# Patient Record
Sex: FEMALE | Race: BLACK OR AFRICAN AMERICAN | NOT HISPANIC OR LATINO
[De-identification: names, ages, dates, MRNs, and addresses within clinical notes are randomized per-mention and may not be internally consistent; named-entity substitution may affect disease eponyms.]

---

## 2017-08-17 ENCOUNTER — APPOINTMENT (OUTPATIENT)
Dept: SPINE | Facility: CLINIC | Age: 62
End: 2017-08-17
Payer: MEDICAID

## 2017-08-17 VITALS
HEIGHT: 64 IN | DIASTOLIC BLOOD PRESSURE: 82 MMHG | SYSTOLIC BLOOD PRESSURE: 134 MMHG | BODY MASS INDEX: 50.02 KG/M2 | WEIGHT: 293 LBS

## 2017-08-17 PROCEDURE — 99205 OFFICE O/P NEW HI 60 MIN: CPT

## 2017-08-25 ENCOUNTER — OUTPATIENT (OUTPATIENT)
Dept: OUTPATIENT SERVICES | Facility: HOSPITAL | Age: 62
LOS: 1 days | End: 2017-08-25
Payer: COMMERCIAL

## 2017-08-25 VITALS
RESPIRATION RATE: 16 BRPM | OXYGEN SATURATION: 98 % | TEMPERATURE: 36 F | DIASTOLIC BLOOD PRESSURE: 81 MMHG | SYSTOLIC BLOOD PRESSURE: 136 MMHG | HEART RATE: 66 BPM | WEIGHT: 293 LBS | HEIGHT: 64 IN

## 2017-08-25 DIAGNOSIS — M48.06 SPINAL STENOSIS, LUMBAR REGION: ICD-10-CM

## 2017-08-25 DIAGNOSIS — G47.30 SLEEP APNEA, UNSPECIFIED: ICD-10-CM

## 2017-08-25 DIAGNOSIS — I10 ESSENTIAL (PRIMARY) HYPERTENSION: ICD-10-CM

## 2017-08-25 DIAGNOSIS — H26.9 UNSPECIFIED CATARACT: Chronic | ICD-10-CM

## 2017-08-25 DIAGNOSIS — G56.03 CARPAL TUNNEL SYNDROME, BILATERAL UPPER LIMBS: Chronic | ICD-10-CM

## 2017-08-25 DIAGNOSIS — E11.9 TYPE 2 DIABETES MELLITUS WITHOUT COMPLICATIONS: ICD-10-CM

## 2017-08-25 DIAGNOSIS — G40.909 EPILEPSY, UNSPECIFIED, NOT INTRACTABLE, WITHOUT STATUS EPILEPTICUS: ICD-10-CM

## 2017-08-25 DIAGNOSIS — Z90.89 ACQUIRED ABSENCE OF OTHER ORGANS: Chronic | ICD-10-CM

## 2017-08-25 DIAGNOSIS — Z90.710 ACQUIRED ABSENCE OF BOTH CERVIX AND UTERUS: Chronic | ICD-10-CM

## 2017-08-25 DIAGNOSIS — Z01.818 ENCOUNTER FOR OTHER PREPROCEDURAL EXAMINATION: ICD-10-CM

## 2017-08-25 DIAGNOSIS — M48.00 SPINAL STENOSIS, SITE UNSPECIFIED: ICD-10-CM

## 2017-08-25 LAB
ANION GAP SERPL CALC-SCNC: 13 MMOL/L — SIGNIFICANT CHANGE UP (ref 5–17)
BUN SERPL-MCNC: 24 MG/DL — HIGH (ref 7–23)
CALCIUM SERPL-MCNC: 10.5 MG/DL — SIGNIFICANT CHANGE UP (ref 8.4–10.5)
CHLORIDE SERPL-SCNC: 105 MMOL/L — SIGNIFICANT CHANGE UP (ref 96–108)
CO2 SERPL-SCNC: 25 MMOL/L — SIGNIFICANT CHANGE UP (ref 22–31)
CREAT SERPL-MCNC: 1 MG/DL — SIGNIFICANT CHANGE UP (ref 0.5–1.3)
GLUCOSE SERPL-MCNC: 85 MG/DL — SIGNIFICANT CHANGE UP (ref 70–99)
HBA1C BLD-MCNC: 5.9 % — HIGH (ref 4–5.6)
HCT VFR BLD CALC: 39.6 % — SIGNIFICANT CHANGE UP (ref 34.5–45)
HGB BLD-MCNC: 12.6 G/DL — SIGNIFICANT CHANGE UP (ref 11.5–15.5)
MCHC RBC-ENTMCNC: 29 PG — SIGNIFICANT CHANGE UP (ref 27–34)
MCHC RBC-ENTMCNC: 31.8 GM/DL — LOW (ref 32–36)
MCV RBC AUTO: 91.2 FL — SIGNIFICANT CHANGE UP (ref 80–100)
PLATELET # BLD AUTO: 231 K/UL — SIGNIFICANT CHANGE UP (ref 150–400)
POTASSIUM SERPL-MCNC: 5.5 MMOL/L — HIGH (ref 3.5–5.3)
POTASSIUM SERPL-SCNC: 5.5 MMOL/L — HIGH (ref 3.5–5.3)
RBC # BLD: 4.34 M/UL — SIGNIFICANT CHANGE UP (ref 3.8–5.2)
RBC # FLD: 16 % — HIGH (ref 10.3–14.5)
SODIUM SERPL-SCNC: 143 MMOL/L — SIGNIFICANT CHANGE UP (ref 135–145)
WBC # BLD: 7.08 K/UL — SIGNIFICANT CHANGE UP (ref 3.8–10.5)
WBC # FLD AUTO: 7.08 K/UL — SIGNIFICANT CHANGE UP (ref 3.8–10.5)

## 2017-08-25 PROCEDURE — G0463: CPT

## 2017-08-25 PROCEDURE — 85027 COMPLETE CBC AUTOMATED: CPT

## 2017-08-25 PROCEDURE — 83036 HEMOGLOBIN GLYCOSYLATED A1C: CPT

## 2017-08-25 PROCEDURE — 80048 BASIC METABOLIC PNL TOTAL CA: CPT

## 2017-08-25 RX ORDER — CEFAZOLIN SODIUM 1 G
3000 VIAL (EA) INJECTION ONCE
Qty: 0 | Refills: 0 | Status: COMPLETED | OUTPATIENT
Start: 2017-09-01 | End: 2017-09-01

## 2017-08-25 NOTE — H&P PST ADULT - PMH
Diabetes  on medications  HTN (hypertension)  on medication  Morbid obesity    Seizure disorder  on medication  stable no episode x 15 years  Sleep apnea  on CPAP x 10-15 years

## 2017-08-25 NOTE — H&P PST ADULT - VISION (WITH CORRECTIVE LENSES IF THE PATIENT USUALLY WEARS THEM):
Normal vision: sees adequately in most situations; can see medication labels, newsprint/weqrs eyeglasses

## 2017-08-25 NOTE — H&P PST ADULT - PROBLEM SELECTOR PLAN 1
L4-5 lumbar laminectomy removal of synovial cyst  CBC/BMP/hgba1c bloods drawn sent pending result    PST instruction given with antibacterial soap given , verbalizes understanding   To continue taking ASA   81 mg  daily as prescribed regularly    Has own appointment with PMD  for medical evaluation

## 2017-08-25 NOTE — H&P PST ADULT - MUSCULOSKELETAL COMMENTS
hx backpains with numbness to both legs uses cane for support on wheelchair while in PST but able to walk short distance

## 2017-08-25 NOTE — H&P PST ADULT - HISTORY OF PRESENT ILLNESS
62 y/o female for L4-5 lumbar laminectomy. Patient has hx of backpains/ sciatica and leg numbness on and off x 1 year . Patient had been seen by PMD tried other conservative treatment losing weight and medications with  physical therapy with no  relief. Patient referred to DR Angulo , evaluated had undergone diagnostic test which was positive for synovial cyst and neural impingement , needed surgical intervention for treatment.

## 2017-08-25 NOTE — H&P PST ADULT - NEUROLOGICAL COMMENTS
hx seizure disorder x  19 years on medications controlled no episode x 15 years as stated by patient

## 2017-08-25 NOTE — H&P PST ADULT - PSH
Acquired cataract  bialteral surgery with lens implants  Bilateral carpal tunnel syndrome  bilateral carpal tunnel release 2016  History of hysterectomy  20 years ago  S/P tonsillectomy  childhood

## 2017-09-01 ENCOUNTER — TRANSCRIPTION ENCOUNTER (OUTPATIENT)
Age: 62
End: 2017-09-01

## 2017-09-01 ENCOUNTER — RESULT REVIEW (OUTPATIENT)
Age: 62
End: 2017-09-01

## 2017-09-01 ENCOUNTER — INPATIENT (INPATIENT)
Facility: HOSPITAL | Age: 62
LOS: 0 days | Discharge: HOME | DRG: 519 | End: 2017-09-01
Attending: NEUROLOGICAL SURGERY | Admitting: NEUROLOGICAL SURGERY
Payer: COMMERCIAL

## 2017-09-01 ENCOUNTER — APPOINTMENT (OUTPATIENT)
Dept: SPINE | Facility: HOSPITAL | Age: 62
End: 2017-09-01

## 2017-09-01 VITALS
OXYGEN SATURATION: 97 % | DIASTOLIC BLOOD PRESSURE: 60 MMHG | SYSTOLIC BLOOD PRESSURE: 126 MMHG | TEMPERATURE: 97 F | RESPIRATION RATE: 16 BRPM | HEART RATE: 58 BPM

## 2017-09-01 VITALS
HEART RATE: 66 BPM | TEMPERATURE: 98 F | OXYGEN SATURATION: 99 % | WEIGHT: 293 LBS | HEIGHT: 64 IN | DIASTOLIC BLOOD PRESSURE: 74 MMHG | SYSTOLIC BLOOD PRESSURE: 193 MMHG | RESPIRATION RATE: 18 BRPM

## 2017-09-01 DIAGNOSIS — M48.00 SPINAL STENOSIS, SITE UNSPECIFIED: ICD-10-CM

## 2017-09-01 DIAGNOSIS — G56.03 CARPAL TUNNEL SYNDROME, BILATERAL UPPER LIMBS: Chronic | ICD-10-CM

## 2017-09-01 DIAGNOSIS — Z90.89 ACQUIRED ABSENCE OF OTHER ORGANS: Chronic | ICD-10-CM

## 2017-09-01 DIAGNOSIS — Z90.710 ACQUIRED ABSENCE OF BOTH CERVIX AND UTERUS: Chronic | ICD-10-CM

## 2017-09-01 DIAGNOSIS — H26.9 UNSPECIFIED CATARACT: Chronic | ICD-10-CM

## 2017-09-01 LAB
ANION GAP SERPL CALC-SCNC: 14 MMOL/L — SIGNIFICANT CHANGE UP (ref 5–17)
BUN SERPL-MCNC: 23 MG/DL — SIGNIFICANT CHANGE UP (ref 7–23)
CALCIUM SERPL-MCNC: 10.8 MG/DL — HIGH (ref 8.4–10.5)
CHLORIDE SERPL-SCNC: 104 MMOL/L — SIGNIFICANT CHANGE UP (ref 96–108)
CO2 SERPL-SCNC: 26 MMOL/L — SIGNIFICANT CHANGE UP (ref 22–31)
CREAT SERPL-MCNC: 0.96 MG/DL — SIGNIFICANT CHANGE UP (ref 0.5–1.3)
GLUCOSE SERPL-MCNC: 90 MG/DL — SIGNIFICANT CHANGE UP (ref 70–99)
POTASSIUM SERPL-MCNC: 4.1 MMOL/L — SIGNIFICANT CHANGE UP (ref 3.5–5.3)
POTASSIUM SERPL-SCNC: 4.1 MMOL/L — SIGNIFICANT CHANGE UP (ref 3.5–5.3)
SODIUM SERPL-SCNC: 144 MMOL/L — SIGNIFICANT CHANGE UP (ref 135–145)

## 2017-09-01 PROCEDURE — 76000 FLUOROSCOPY <1 HR PHYS/QHP: CPT

## 2017-09-01 PROCEDURE — 63267 EXCISE INTRSPINL LESION LMBR: CPT

## 2017-09-01 PROCEDURE — 80048 BASIC METABOLIC PNL TOTAL CA: CPT

## 2017-09-01 PROCEDURE — 69990 MICROSURGERY ADD-ON: CPT

## 2017-09-01 PROCEDURE — 88304 TISSUE EXAM BY PATHOLOGIST: CPT | Mod: 26

## 2017-09-01 PROCEDURE — 88304 TISSUE EXAM BY PATHOLOGIST: CPT

## 2017-09-01 PROCEDURE — C1889: CPT

## 2017-09-01 PROCEDURE — C1769: CPT

## 2017-09-01 RX ORDER — DIVALPROEX SODIUM 500 MG/1
0 TABLET, DELAYED RELEASE ORAL
Qty: 0 | Refills: 0 | COMMUNITY

## 2017-09-01 RX ORDER — GABAPENTIN 400 MG/1
0 CAPSULE ORAL
Qty: 0 | Refills: 0 | COMMUNITY

## 2017-09-01 RX ORDER — OXYCODONE AND ACETAMINOPHEN 5; 325 MG/1; MG/1
1 TABLET ORAL EVERY 4 HOURS
Qty: 0 | Refills: 0 | Status: DISCONTINUED | OUTPATIENT
Start: 2017-09-01 | End: 2017-09-01

## 2017-09-01 RX ORDER — OXYCODONE HYDROCHLORIDE 5 MG/1
2 TABLET ORAL
Qty: 0 | Refills: 0 | COMMUNITY
Start: 2017-09-01

## 2017-09-01 RX ORDER — GABAPENTIN 400 MG/1
300 CAPSULE ORAL
Qty: 0 | Refills: 0 | Status: DISCONTINUED | OUTPATIENT
Start: 2017-09-01 | End: 2017-09-01

## 2017-09-01 RX ORDER — AMLODIPINE BESYLATE AND BENAZEPRIL HYDROCHLORIDE 10; 20 MG/1; MG/1
1 CAPSULE ORAL
Qty: 0 | Refills: 0 | COMMUNITY

## 2017-09-01 RX ORDER — SENNA PLUS 8.6 MG/1
2 TABLET ORAL AT BEDTIME
Qty: 0 | Refills: 0 | Status: DISCONTINUED | OUTPATIENT
Start: 2017-09-01 | End: 2017-09-01

## 2017-09-01 RX ORDER — CEFAZOLIN SODIUM 1 G
3000 VIAL (EA) INJECTION EVERY 8 HOURS
Qty: 0 | Refills: 0 | Status: DISCONTINUED | OUTPATIENT
Start: 2017-09-01 | End: 2017-09-01

## 2017-09-01 RX ORDER — OXYCODONE HYDROCHLORIDE 5 MG/1
2 TABLET ORAL
Qty: 30 | Refills: 0 | OUTPATIENT
Start: 2017-09-01

## 2017-09-01 RX ORDER — ASPIRIN/CALCIUM CARB/MAGNESIUM 324 MG
1 TABLET ORAL
Qty: 0 | Refills: 0 | COMMUNITY

## 2017-09-01 RX ORDER — DOCUSATE SODIUM 100 MG
100 CAPSULE ORAL THREE TIMES A DAY
Qty: 0 | Refills: 0 | Status: DISCONTINUED | OUTPATIENT
Start: 2017-09-01 | End: 2017-09-01

## 2017-09-01 RX ORDER — CEPHALEXIN 500 MG
1 CAPSULE ORAL
Qty: 20 | Refills: 0 | OUTPATIENT
Start: 2017-09-01 | End: 2017-09-11

## 2017-09-01 RX ORDER — OXYCODONE AND ACETAMINOPHEN 5; 325 MG/1; MG/1
2 TABLET ORAL EVERY 4 HOURS
Qty: 0 | Refills: 0 | Status: DISCONTINUED | OUTPATIENT
Start: 2017-09-01 | End: 2017-09-01

## 2017-09-01 RX ORDER — OXYCODONE HYDROCHLORIDE 5 MG/1
1 TABLET ORAL
Qty: 0 | Refills: 0 | COMMUNITY
Start: 2017-09-01

## 2017-09-01 RX ORDER — DOCUSATE SODIUM 100 MG
1 CAPSULE ORAL
Qty: 0 | Refills: 0 | COMMUNITY
Start: 2017-09-01

## 2017-09-01 RX ORDER — LIDOCAINE HCL 20 MG/ML
0.2 VIAL (ML) INJECTION ONCE
Qty: 0 | Refills: 0 | Status: DISCONTINUED | OUTPATIENT
Start: 2017-09-01 | End: 2017-09-01

## 2017-09-01 RX ORDER — METOPROLOL TARTRATE 50 MG
12.5 TABLET ORAL
Qty: 0 | Refills: 0 | Status: DISCONTINUED | OUTPATIENT
Start: 2017-09-01 | End: 2017-09-01

## 2017-09-01 RX ORDER — ONDANSETRON 8 MG/1
4 TABLET, FILM COATED ORAL ONCE
Qty: 0 | Refills: 0 | Status: DISCONTINUED | OUTPATIENT
Start: 2017-09-01 | End: 2017-09-01

## 2017-09-01 RX ORDER — INSULIN LISPRO 100/ML
VIAL (ML) SUBCUTANEOUS
Qty: 0 | Refills: 0 | Status: DISCONTINUED | OUTPATIENT
Start: 2017-09-01 | End: 2017-09-01

## 2017-09-01 RX ORDER — DIVALPROEX SODIUM 500 MG/1
500 TABLET, DELAYED RELEASE ORAL
Qty: 0 | Refills: 0 | Status: DISCONTINUED | OUTPATIENT
Start: 2017-09-01 | End: 2017-09-01

## 2017-09-01 RX ORDER — SODIUM CHLORIDE 9 MG/ML
3 INJECTION INTRAMUSCULAR; INTRAVENOUS; SUBCUTANEOUS EVERY 8 HOURS
Qty: 0 | Refills: 0 | Status: DISCONTINUED | OUTPATIENT
Start: 2017-09-01 | End: 2017-09-01

## 2017-09-01 RX ORDER — HYDROMORPHONE HYDROCHLORIDE 2 MG/ML
0.2 INJECTION INTRAMUSCULAR; INTRAVENOUS; SUBCUTANEOUS
Qty: 0 | Refills: 0 | Status: DISCONTINUED | OUTPATIENT
Start: 2017-09-01 | End: 2017-09-01

## 2017-09-01 RX ORDER — INSULIN LISPRO 100/ML
VIAL (ML) SUBCUTANEOUS AT BEDTIME
Qty: 0 | Refills: 0 | Status: DISCONTINUED | OUTPATIENT
Start: 2017-09-01 | End: 2017-09-01

## 2017-09-01 RX ORDER — DEXTROSE MONOHYDRATE, SODIUM CHLORIDE, AND POTASSIUM CHLORIDE 50; .745; 4.5 G/1000ML; G/1000ML; G/1000ML
1000 INJECTION, SOLUTION INTRAVENOUS
Qty: 0 | Refills: 0 | Status: DISCONTINUED | OUTPATIENT
Start: 2017-09-01 | End: 2017-09-01

## 2017-09-01 RX ORDER — AMLODIPINE BESYLATE 2.5 MG/1
10 TABLET ORAL DAILY
Qty: 0 | Refills: 0 | Status: DISCONTINUED | OUTPATIENT
Start: 2017-09-01 | End: 2017-09-01

## 2017-09-01 RX ADMIN — Medication 100 MILLIGRAM(S): at 14:24

## 2017-09-01 RX ADMIN — DEXTROSE MONOHYDRATE, SODIUM CHLORIDE, AND POTASSIUM CHLORIDE 75 MILLILITER(S): 50; .745; 4.5 INJECTION, SOLUTION INTRAVENOUS at 11:26

## 2017-09-01 RX ADMIN — GABAPENTIN 300 MILLIGRAM(S): 400 CAPSULE ORAL at 12:42

## 2017-09-01 NOTE — ASU DISCHARGE PLAN (ADULT/PEDIATRIC). - NOTIFY
Unable to Urinate/Persistent Nausea and Vomiting/Swelling that continues/Numbness, color, or temperature change to extremity/Excessive Diarrhea/Go to the ED if you have any: bleeding, nausea, vomiting, diarrhea, constipation, fevers, chills, chest pain, shortness of breath, dizziness, gait/balance disturbances, wound drainage/erythema, pain not controlled by medication, or any other new symptoms./Numbness, tingling/Bleeding that does not stop/Pain not relieved by Medications/Fever greater than 101/Inability to Tolerate Liquids or Foods

## 2017-09-01 NOTE — ASU DISCHARGE PLAN (ADULT/PEDIATRIC). - MEDICATION SUMMARY - MEDICATIONS TO STOP TAKING
I will STOP taking the medications listed below when I get home from the hospital:    Ecotrin Adult Low Strength 81 mg oral delayed release tablet  -- 1 tab(s) by mouth once a day

## 2017-09-01 NOTE — ASU DISCHARGE PLAN (ADULT/PEDIATRIC). - FOLLOWUP APPOINTMENT CLINIC/PHYSICIAN
follow up with neurosurgery, Dr. Angulo, within 1 week of discharge, call [phone]  follow up with PMD within 1 week of discharge

## 2017-09-01 NOTE — ASU DISCHARGE PLAN (ADULT/PEDIATRIC). - ACTIVITY LEVEL
no heavy lifting/no sports/gym/no tub baths/no lifting or strenuous activity.  do not operate machinery until cleared to do so by physician.  do not drive until cleared to do so by physician/no exercise no sports/gym/no tub baths/no heavy lifting/no exercise/no lifting or strenuous activity.  do not operate machinery until cleared to do so by physician.  do not return to work until cleared to do so by physician

## 2017-09-07 LAB — SURGICAL PATHOLOGY STUDY: SIGNIFICANT CHANGE UP

## 2017-09-08 ENCOUNTER — APPOINTMENT (OUTPATIENT)
Dept: SPINE | Facility: CLINIC | Age: 62
End: 2017-09-08
Payer: MEDICAID

## 2017-09-08 VITALS
WEIGHT: 293 LBS | SYSTOLIC BLOOD PRESSURE: 140 MMHG | RESPIRATION RATE: 18 BRPM | DIASTOLIC BLOOD PRESSURE: 82 MMHG | HEIGHT: 64 IN | HEART RATE: 72 BPM | BODY MASS INDEX: 50.02 KG/M2 | TEMPERATURE: 98.1 F | OXYGEN SATURATION: 96 %

## 2017-09-08 DIAGNOSIS — Z86.39 PERSONAL HISTORY OF OTHER ENDOCRINE, NUTRITIONAL AND METABOLIC DISEASE: ICD-10-CM

## 2017-09-08 DIAGNOSIS — Z78.9 OTHER SPECIFIED HEALTH STATUS: ICD-10-CM

## 2017-09-08 DIAGNOSIS — F17.200 NICOTINE DEPENDENCE, UNSPECIFIED, UNCOMPLICATED: ICD-10-CM

## 2017-09-08 DIAGNOSIS — Z86.79 PERSONAL HISTORY OF OTHER DISEASES OF THE CIRCULATORY SYSTEM: ICD-10-CM

## 2017-09-08 PROCEDURE — 99024 POSTOP FOLLOW-UP VISIT: CPT

## 2017-09-08 RX ORDER — METOPROLOL SUCCINATE 100 MG/1
100 TABLET, EXTENDED RELEASE ORAL
Qty: 60 | Refills: 0 | Status: ACTIVE | COMMUNITY
Start: 2017-06-19

## 2017-09-08 RX ORDER — HYDROCHLOROTHIAZIDE 12.5 MG/1
12.5 TABLET ORAL
Qty: 60 | Refills: 0 | Status: ACTIVE | COMMUNITY
Start: 2017-06-19

## 2017-09-08 RX ORDER — OXYCODONE AND ACETAMINOPHEN 5; 325 MG/1; MG/1
5-325 TABLET ORAL
Qty: 30 | Refills: 0 | Status: DISCONTINUED | COMMUNITY
Start: 2017-09-01

## 2017-09-08 RX ORDER — DIVALPROEX SODIUM 500 MG/1
500 TABLET, DELAYED RELEASE ORAL
Qty: 120 | Refills: 0 | Status: ACTIVE | COMMUNITY
Start: 2017-04-26

## 2017-09-08 RX ORDER — ASPIRIN ENTERIC COATED TABLETS 81 MG 81 MG/1
81 TABLET, DELAYED RELEASE ORAL
Refills: 0 | Status: ACTIVE | COMMUNITY

## 2017-09-21 ENCOUNTER — APPOINTMENT (OUTPATIENT)
Dept: SPINE | Facility: CLINIC | Age: 62
End: 2017-09-21
Payer: MEDICAID

## 2017-09-21 VITALS
SYSTOLIC BLOOD PRESSURE: 129 MMHG | OXYGEN SATURATION: 68 % | BODY MASS INDEX: 50.02 KG/M2 | HEART RATE: 99 BPM | TEMPERATURE: 98.1 F | HEIGHT: 64 IN | WEIGHT: 293 LBS | DIASTOLIC BLOOD PRESSURE: 68 MMHG

## 2017-09-21 PROCEDURE — 99024 POSTOP FOLLOW-UP VISIT: CPT

## 2017-10-05 ENCOUNTER — APPOINTMENT (OUTPATIENT)
Dept: SPINE | Facility: CLINIC | Age: 62
End: 2017-10-05
Payer: MEDICAID

## 2017-10-05 VITALS
SYSTOLIC BLOOD PRESSURE: 130 MMHG | WEIGHT: 293 LBS | HEIGHT: 64 IN | DIASTOLIC BLOOD PRESSURE: 74 MMHG | BODY MASS INDEX: 50.02 KG/M2

## 2017-10-05 PROCEDURE — 99024 POSTOP FOLLOW-UP VISIT: CPT

## 2017-10-05 RX ORDER — TRAMADOL HYDROCHLORIDE 50 MG/1
50 TABLET, COATED ORAL
Qty: 42 | Refills: 0 | Status: DISCONTINUED | COMMUNITY
Start: 2017-09-08 | End: 2017-10-05

## 2018-01-18 ENCOUNTER — APPOINTMENT (OUTPATIENT)
Dept: SPINE | Facility: CLINIC | Age: 63
End: 2018-01-18
Payer: MEDICAID

## 2018-01-18 VITALS
HEIGHT: 64 IN | WEIGHT: 293 LBS | SYSTOLIC BLOOD PRESSURE: 128 MMHG | BODY MASS INDEX: 50.02 KG/M2 | DIASTOLIC BLOOD PRESSURE: 72 MMHG

## 2018-01-18 PROCEDURE — 99213 OFFICE O/P EST LOW 20 MIN: CPT

## 2018-05-03 ENCOUNTER — APPOINTMENT (OUTPATIENT)
Dept: SPINE | Facility: CLINIC | Age: 63
End: 2018-05-03

## 2019-04-10 NOTE — H&P PST ADULT - PROBLEM SELECTOR PLAN 3
Flatulence
Eosinophilia
Thrombocytopenia
Flatulence
DM (diabetes mellitus)
DM (diabetes mellitus)
Flatulence
Thrombocytopenia
Flatulence
to continue taking prescribed medication for seizure Depakote  regularly and on the DOS
DM (diabetes mellitus)

## 2022-03-30 PROBLEM — E11.9 TYPE 2 DIABETES MELLITUS WITHOUT COMPLICATIONS: Chronic | Status: ACTIVE | Noted: 2017-08-25

## 2022-03-30 PROBLEM — G47.30 SLEEP APNEA, UNSPECIFIED: Chronic | Status: ACTIVE | Noted: 2017-08-25

## 2022-03-30 PROBLEM — G40.909 EPILEPSY, UNSPECIFIED, NOT INTRACTABLE, WITHOUT STATUS EPILEPTICUS: Chronic | Status: ACTIVE | Noted: 2017-08-25

## 2022-03-30 PROBLEM — I10 ESSENTIAL (PRIMARY) HYPERTENSION: Chronic | Status: ACTIVE | Noted: 2017-08-25

## 2022-03-30 PROBLEM — E66.01 MORBID (SEVERE) OBESITY DUE TO EXCESS CALORIES: Chronic | Status: ACTIVE | Noted: 2017-08-25

## 2022-04-07 ENCOUNTER — NON-APPOINTMENT (OUTPATIENT)
Age: 67
End: 2022-04-07

## 2022-04-07 ENCOUNTER — APPOINTMENT (OUTPATIENT)
Dept: SPINE | Facility: CLINIC | Age: 67
End: 2022-04-07
Payer: MEDICARE

## 2022-04-07 VITALS
OXYGEN SATURATION: 95 % | BODY MASS INDEX: 45.58 KG/M2 | HEART RATE: 84 BPM | SYSTOLIC BLOOD PRESSURE: 157 MMHG | HEIGHT: 64 IN | WEIGHT: 267 LBS | DIASTOLIC BLOOD PRESSURE: 77 MMHG

## 2022-04-07 PROCEDURE — 99203 OFFICE O/P NEW LOW 30 MIN: CPT

## 2022-04-07 RX ORDER — METOPROLOL SUCCINATE AND HYDROCHLOROTHIAZIDE 100; 12.5 MG/1; MG/1
100-12.5 TABLET ORAL
Refills: 0 | Status: DISCONTINUED | COMMUNITY
End: 2022-04-07

## 2022-04-07 RX ORDER — METFORMIN HYDROCHLORIDE 500 MG/1
500 TABLET, COATED ORAL
Refills: 0 | Status: DISCONTINUED | COMMUNITY
End: 2022-04-07

## 2022-04-07 RX ORDER — CEPHALEXIN 500 MG/1
500 CAPSULE ORAL
Qty: 20 | Refills: 0 | Status: DISCONTINUED | COMMUNITY
Start: 2017-09-01 | End: 2022-04-07

## 2022-04-07 RX ORDER — AMLODIPINE BESYLATE AND BENAZEPRIL HYDROCHLORIDE 10; 20 MG/1; MG/1
10-20 CAPSULE ORAL
Qty: 90 | Refills: 0 | Status: DISCONTINUED | COMMUNITY
Start: 2017-05-16 | End: 2022-04-07

## 2022-04-07 RX ORDER — GABAPENTIN 300 MG/1
300 CAPSULE ORAL
Qty: 360 | Refills: 0 | Status: DISCONTINUED | COMMUNITY
Start: 2017-08-24 | End: 2022-04-07

## 2022-04-07 RX ORDER — PIOGLITAZONE HYDROCHLORIDE AND METFORMIN HYDROCHLORIDE 15; 500 MG/1; MG/1
15-500 TABLET, FILM COATED ORAL
Qty: 180 | Refills: 0 | Status: DISCONTINUED | COMMUNITY
Start: 2017-05-16 | End: 2022-04-07

## 2022-04-08 NOTE — CONSULT LETTER
[Dear  ___] : Dear  [unfilled], [Consult Letter:] : I had the pleasure of evaluating your patient, [unfilled]. [Please see my note below.] : Please see my note below. [Consult Closing:] : Thank you very much for allowing me to participate in the care of this patient.  If you have any questions, please do not hesitate to contact me. [Sincerely,] : Sincerely, [FreeTextEntry3] : Miguel Angulo MD\par Chief of Neurosurgery [facility]\par [facility]\par

## 2022-04-08 NOTE — PHYSICAL EXAM
[Person] : oriented to person [Place] : oriented to place [Time] : oriented to time [Short Term Intact] : short term memory intact [Remote Intact] : remote memory intact [Span Intact] : the attention span was normal [Concentration Intact] : normal concentrating ability [Fluency] : fluency intact [Comprehension] : comprehension intact [Current Events] : adequate knowledge of current events [Past History] : adequate knowledge of personal past history [Vocabulary] : adequate range of vocabulary [Cranial Nerves Optic (II)] : visual acuity intact bilaterally,  pupils equal round and reactive to light [Cranial Nerves Trigeminal (V)] : facial sensation intact symmetrically [Cranial Nerves Oculomotor (III)] : extraocular motion intact [Cranial Nerves Facial (VII)] : face symmetrical [Cranial Nerves Vestibulocochlear (VIII)] : hearing was intact bilaterally [Cranial Nerves Glossopharyngeal (IX)] : tongue and palate midline [Cranial Nerves Accessory (XI - Cranial And Spinal)] : head turning and shoulder shrug symmetric [Cranial Nerves Hypoglossal (XII)] : there was no tongue deviation with protrusion [Motor Tone] : muscle tone was normal in all four extremities [No Muscle Atrophy] : normal bulk in all four extremities [Sensation Tactile Decrease] : light touch was intact [Abnormal Walk] : normal gait [Balance] : balance was intact [1+] : Ankle jerk left 1+ [Past-pointing] : there was no past-pointing [Tremor] : no tremor present [Tsai] : Tsai's sign was not demonstrated [FreeTextEntry6] : left EHL and dorsiflexion 4/5 [FreeTextEntry8] : using a cane [Straight-Leg Raise Test - Left] : straight leg raise of the left leg was negative [Straight-Leg Raise Test - Right] : straight leg raise  of the right leg was negative

## 2022-04-08 NOTE — HISTORY OF PRESENT ILLNESS
[> 3 months] : more  than 3 months [FreeTextEntry1] : Lower back pain  [de-identified] : Ms. Waters is a 66 year old female who underwent laminectomy and removal of lumbar synovial cyst 9/07/2017. She is here today with difficulty walking for two years. She has lower back pain radiating down left leg with numbness of foot and great toe weakness. No pain in right leg. Ambulates with a cane. Takes gabapentin 400mg four times a day with some relief. No physical therapy for lower back pain, acupuncture, chiropractor therapy has been initiated. She has urinary innocence for several  years. Bowel incontinence started August 2021. She is following with GI.   A recent MRI scan of the lumbar spine shows central stenosis at L2-L5 of a moderate degree. There are postsurgical changes on the left at L4-5.  there is also a grade 1 L4-5 spondylolisthesis.    she has not had any nonsurgical management.

## 2022-04-08 NOTE — ASSESSMENT
[FreeTextEntry1] : 66 year old female with multilevel lumbar spinal stenosis with L4-5 spondylolisthesis and postsurgical changes.  Partial left foot drop.  I suggested she try a course of physical therapy and pain management prior to consideration of further surgery.  I examined and evaluated this patient along with the nurse practitioner and we agreed upon plan care.

## 2022-06-23 ENCOUNTER — APPOINTMENT (OUTPATIENT)
Dept: SPINE | Facility: CLINIC | Age: 67
End: 2022-06-23
Payer: MEDICARE

## 2022-06-23 VITALS
WEIGHT: 267 LBS | SYSTOLIC BLOOD PRESSURE: 149 MMHG | HEIGHT: 64 IN | DIASTOLIC BLOOD PRESSURE: 80 MMHG | BODY MASS INDEX: 45.58 KG/M2 | OXYGEN SATURATION: 99 % | HEART RATE: 59 BPM

## 2022-06-23 DIAGNOSIS — Z98.890 OTHER SPECIFIED POSTPROCEDURAL STATES: ICD-10-CM

## 2022-06-23 DIAGNOSIS — M43.16 SPONDYLOLISTHESIS, LUMBAR REGION: ICD-10-CM

## 2022-06-23 DIAGNOSIS — M48.00 SPINAL STENOSIS, SITE UNSPECIFIED: ICD-10-CM

## 2022-06-23 PROCEDURE — 99213 OFFICE O/P EST LOW 20 MIN: CPT

## 2022-06-23 NOTE — PHYSICAL EXAM
[Sensation Tactile Decrease] : light touch was intact [Abnormal Walk] : normal gait [FreeTextEntry6] : left EHL and dorsiflexion 4/5

## 2022-06-23 NOTE — ASSESSMENT
[FreeTextEntry1] : 66 year old female with lower back pain radiating down left leg with numbness of foot and great toe weakness. Pt has multilevel lumbar spinal stenosis with L4-5 spondylolisthesis and partial left foot drop. Referred to pain management for LESI. She will contact the office if there is no improvement with her pain and surgical options will be discussed. CT Scan of lumbar spine, Lumbar flex/ext and standing scoliosis will be ordered then.

## 2022-07-13 NOTE — BRIEF OPERATIVE NOTE - PRIMARY SURGEON
Please see attached PT progress note.  Patient follows up with your team on 7/15.  Please advise on protocol and if any changes are made to protocol.  Thank you! Dr. Angulo

## 2023-01-31 NOTE — REASON FOR VISIT
[id number]- Dr Barthel called and said she spoke w/ Dr Gallegos, admitted patient and to start Pitocin for augmentation and can have Fioricet for headache if needed.   [Follow-Up: _____] : a [unfilled] follow-up visit [FreeTextEntry1] : Ms. Waters is a 66 year old female who underwent laminectomy and removal of lumbar synovial cyst 9/07/2017. She was last seen on 4/7/2022 with difficulty walking for two years. She has lower back pain radiating down left leg with numbness of foot and great toe weakness. Pt also has a partial left foot drop. Takes gabapentin 400 mg four times a day with some relief. No physical therapy for lower back pain, acupuncture, chiropractor therapy has been initiated.  MRI scan of the lumbar spine (4/28/21) shows central stenosis at L2-L5 of a moderate degree. There are postsurgical changes on the left at L4-5. there is also a grade 1 L4-5 spondylolisthesis. she has not had any nonsurgical management. \par \par Today she reports no changes with her symptoms. She has done 3 sessions of physical therapy and it is ongoing with no improvement of symptoms. No pain management has been initiated.

## 2024-07-03 NOTE — PATIENT PROFILE ADULT. - TEACHING/LEARNING RELIGIOUS CONSIDERATIONS
CT AP without contrast on 6/18 showed: Essentially unchanged tubular collection of fluid and multiple air droplets in the left side of the pelvis anteriorly, extending focally into the adjacent anterior pelvic wall, and abutting the sigmoid colon. This collection may be related to a contained prior perforation, or possibly may represent an infected hernia plug, as a plug like structure was previously seen in this location on the prior studies dating back to 2011, and is currently not visualized elsewhere.  Increased L inguinal swelling, erythema and tenderness noted on 7/2   Appreciate surgery re-evaluation and recommendations  Resumed on IV antibiotic and transferred to acute care due to concern for abscess  N.p.o. at midnight for possible OR tomorrow for washout  Started on continuous IV fluids at 100 cc/h -- monitor volume status closely in the setting of cardiomyopathy  Eliquis placed on hold for now, pending additional discussion with neurology given recent CVA   none

## 2025-06-14 ENCOUNTER — RESULT REVIEW (OUTPATIENT)
Age: 70
End: 2025-06-14

## 2025-06-14 ENCOUNTER — INPATIENT (INPATIENT)
Facility: HOSPITAL | Age: 70
LOS: 22 days | Discharge: NURSING, REHAB OR LONG-TERM CARE | DRG: 316 | End: 2025-07-07
Attending: STUDENT IN AN ORGANIZED HEALTH CARE EDUCATION/TRAINING PROGRAM | Admitting: INTERNAL MEDICINE
Payer: MEDICARE

## 2025-06-14 VITALS
RESPIRATION RATE: 22 BRPM | HEART RATE: 103 BPM | DIASTOLIC BLOOD PRESSURE: 72 MMHG | TEMPERATURE: 99 F | SYSTOLIC BLOOD PRESSURE: 156 MMHG | WEIGHT: 215.83 LBS | HEIGHT: 64 IN | OXYGEN SATURATION: 95 %

## 2025-06-14 DIAGNOSIS — E11.9 TYPE 2 DIABETES MELLITUS WITHOUT COMPLICATIONS: ICD-10-CM

## 2025-06-14 DIAGNOSIS — I42.9 CARDIOMYOPATHY, UNSPECIFIED: ICD-10-CM

## 2025-06-14 DIAGNOSIS — I50.20 UNSPECIFIED SYSTOLIC (CONGESTIVE) HEART FAILURE: ICD-10-CM

## 2025-06-14 DIAGNOSIS — G56.03 CARPAL TUNNEL SYNDROME, BILATERAL UPPER LIMBS: Chronic | ICD-10-CM

## 2025-06-14 DIAGNOSIS — J96.01 ACUTE RESPIRATORY FAILURE WITH HYPOXIA: ICD-10-CM

## 2025-06-14 DIAGNOSIS — H26.9 UNSPECIFIED CATARACT: Chronic | ICD-10-CM

## 2025-06-14 DIAGNOSIS — Z90.89 ACQUIRED ABSENCE OF OTHER ORGANS: Chronic | ICD-10-CM

## 2025-06-14 DIAGNOSIS — R56.9 UNSPECIFIED CONVULSIONS: ICD-10-CM

## 2025-06-14 DIAGNOSIS — Z90.710 ACQUIRED ABSENCE OF BOTH CERVIX AND UTERUS: Chronic | ICD-10-CM

## 2025-06-14 LAB
A1C WITH ESTIMATED AVERAGE GLUCOSE RESULT: 5.3 % — SIGNIFICANT CHANGE UP (ref 4–5.6)
ALBUMIN SERPL ELPH-MCNC: 3.3 G/DL — SIGNIFICANT CHANGE UP (ref 3.3–5)
ALP SERPL-CCNC: 55 U/L — SIGNIFICANT CHANGE UP (ref 40–120)
ALT FLD-CCNC: 5 U/L — LOW (ref 10–45)
ANION GAP SERPL CALC-SCNC: 15 MMOL/L — SIGNIFICANT CHANGE UP (ref 5–17)
APTT BLD: 26.1 SEC — SIGNIFICANT CHANGE UP (ref 26.1–36.8)
APTT BLD: 41.3 SEC — HIGH (ref 26.1–36.8)
AST SERPL-CCNC: 9 U/L — LOW (ref 10–40)
BASOPHILS # BLD AUTO: 0.03 K/UL — SIGNIFICANT CHANGE UP (ref 0–0.2)
BASOPHILS NFR BLD AUTO: 0.4 % — SIGNIFICANT CHANGE UP (ref 0–2)
BILIRUB SERPL-MCNC: 0.3 MG/DL — SIGNIFICANT CHANGE UP (ref 0.2–1.2)
BLD GP AB SCN SERPL QL: NEGATIVE — SIGNIFICANT CHANGE UP
BUN SERPL-MCNC: 48 MG/DL — HIGH (ref 7–23)
CALCIUM SERPL-MCNC: 9.7 MG/DL — SIGNIFICANT CHANGE UP (ref 8.4–10.5)
CHLORIDE SERPL-SCNC: 102 MMOL/L — SIGNIFICANT CHANGE UP (ref 96–108)
CHOLEST SERPL-MCNC: 143 MG/DL — SIGNIFICANT CHANGE UP
CO2 SERPL-SCNC: 24 MMOL/L — SIGNIFICANT CHANGE UP (ref 22–31)
CREAT SERPL-MCNC: 1.3 MG/DL — SIGNIFICANT CHANGE UP (ref 0.5–1.3)
EGFR: 44 ML/MIN/1.73M2 — LOW
EGFR: 44 ML/MIN/1.73M2 — LOW
EOSINOPHIL # BLD AUTO: 0.07 K/UL — SIGNIFICANT CHANGE UP (ref 0–0.5)
EOSINOPHIL NFR BLD AUTO: 0.8 % — SIGNIFICANT CHANGE UP (ref 0–6)
ESTIMATED AVERAGE GLUCOSE: 105 MG/DL — SIGNIFICANT CHANGE UP (ref 68–114)
GAS PNL BLDA: SIGNIFICANT CHANGE UP
GAS PNL BLDA: SIGNIFICANT CHANGE UP
GLUCOSE BLDC GLUCOMTR-MCNC: 107 MG/DL — HIGH (ref 70–99)
GLUCOSE BLDC GLUCOMTR-MCNC: 125 MG/DL — HIGH (ref 70–99)
GLUCOSE BLDC GLUCOMTR-MCNC: 130 MG/DL — HIGH (ref 70–99)
GLUCOSE BLDC GLUCOMTR-MCNC: 177 MG/DL — HIGH (ref 70–99)
GLUCOSE BLDC GLUCOMTR-MCNC: 94 MG/DL — SIGNIFICANT CHANGE UP (ref 70–99)
GLUCOSE SERPL-MCNC: 94 MG/DL — SIGNIFICANT CHANGE UP (ref 70–99)
HCT VFR BLD CALC: 31 % — LOW (ref 34.5–45)
HCT VFR BLD CALC: 33.5 % — LOW (ref 34.5–45)
HDLC SERPL-MCNC: 46 MG/DL — LOW
HGB BLD-MCNC: 9.2 G/DL — LOW (ref 11.5–15.5)
HGB BLD-MCNC: 9.9 G/DL — LOW (ref 11.5–15.5)
IMM GRANULOCYTES NFR BLD AUTO: 0.2 % — SIGNIFICANT CHANGE UP (ref 0–0.9)
INR BLD: 1.05 RATIO — SIGNIFICANT CHANGE UP (ref 0.85–1.16)
LACTATE SERPL-SCNC: 1.3 MMOL/L — SIGNIFICANT CHANGE UP (ref 0.5–2)
LACTATE SERPL-SCNC: 2.4 MMOL/L — HIGH (ref 0.5–2)
LDLC SERPL-MCNC: 82 MG/DL — SIGNIFICANT CHANGE UP
LIPID PNL WITH DIRECT LDL SERPL: 82 MG/DL — SIGNIFICANT CHANGE UP
LYMPHOCYTES # BLD AUTO: 1.23 K/UL — SIGNIFICANT CHANGE UP (ref 1–3.3)
LYMPHOCYTES # BLD AUTO: 14.4 % — SIGNIFICANT CHANGE UP (ref 13–44)
MAGNESIUM SERPL-MCNC: 2 MG/DL — SIGNIFICANT CHANGE UP (ref 1.6–2.6)
MCHC RBC-ENTMCNC: 27.8 PG — SIGNIFICANT CHANGE UP (ref 27–34)
MCHC RBC-ENTMCNC: 28 PG — SIGNIFICANT CHANGE UP (ref 27–34)
MCHC RBC-ENTMCNC: 29.6 G/DL — LOW (ref 32–36)
MCHC RBC-ENTMCNC: 29.7 G/DL — LOW (ref 32–36)
MCV RBC AUTO: 94.1 FL — SIGNIFICANT CHANGE UP (ref 80–100)
MCV RBC AUTO: 94.5 FL — SIGNIFICANT CHANGE UP (ref 80–100)
MONOCYTES # BLD AUTO: 1.45 K/UL — HIGH (ref 0–0.9)
MONOCYTES NFR BLD AUTO: 17 % — HIGH (ref 2–14)
NEUTROPHILS # BLD AUTO: 5.72 K/UL — SIGNIFICANT CHANGE UP (ref 1.8–7.4)
NEUTROPHILS NFR BLD AUTO: 67.2 % — SIGNIFICANT CHANGE UP (ref 43–77)
NONHDLC SERPL-MCNC: 97 MG/DL — SIGNIFICANT CHANGE UP
NRBC BLD AUTO-RTO: 0 /100 WBCS — SIGNIFICANT CHANGE UP (ref 0–0)
NRBC BLD AUTO-RTO: 0 /100 WBCS — SIGNIFICANT CHANGE UP (ref 0–0)
NT-PROBNP SERPL-SCNC: 2316 PG/ML — HIGH (ref 0–300)
PHOSPHATE SERPL-MCNC: 3.8 MG/DL — SIGNIFICANT CHANGE UP (ref 2.5–4.5)
PLATELET # BLD AUTO: 272 K/UL — SIGNIFICANT CHANGE UP (ref 150–400)
PLATELET # BLD AUTO: 316 K/UL — SIGNIFICANT CHANGE UP (ref 150–400)
POTASSIUM SERPL-MCNC: 4.4 MMOL/L — SIGNIFICANT CHANGE UP (ref 3.5–5.3)
POTASSIUM SERPL-SCNC: 4.4 MMOL/L — SIGNIFICANT CHANGE UP (ref 3.5–5.3)
PROCALCITONIN SERPL-MCNC: 0.17 NG/ML — HIGH (ref 0.02–0.1)
PROT SERPL-MCNC: 7.2 G/DL — SIGNIFICANT CHANGE UP (ref 6–8.3)
PROTHROM AB SERPL-ACNC: 12.1 SEC — SIGNIFICANT CHANGE UP (ref 9.9–13.4)
RBC # BLD: 3.28 M/UL — LOW (ref 3.8–5.2)
RBC # BLD: 3.56 M/UL — LOW (ref 3.8–5.2)
RBC # FLD: 17.8 % — HIGH (ref 10.3–14.5)
RBC # FLD: 17.9 % — HIGH (ref 10.3–14.5)
RH IG SCN BLD-IMP: POSITIVE — SIGNIFICANT CHANGE UP
RH IG SCN BLD-IMP: POSITIVE — SIGNIFICANT CHANGE UP
SODIUM SERPL-SCNC: 141 MMOL/L — SIGNIFICANT CHANGE UP (ref 135–145)
TRIGL SERPL-MCNC: 78 MG/DL — SIGNIFICANT CHANGE UP
TSH SERPL-MCNC: 0.89 UIU/ML — SIGNIFICANT CHANGE UP (ref 0.27–4.2)
WBC # BLD: 7.82 K/UL — SIGNIFICANT CHANGE UP (ref 3.8–10.5)
WBC # BLD: 8.52 K/UL — SIGNIFICANT CHANGE UP (ref 3.8–10.5)
WBC # FLD AUTO: 7.82 K/UL — SIGNIFICANT CHANGE UP (ref 3.8–10.5)
WBC # FLD AUTO: 8.52 K/UL — SIGNIFICANT CHANGE UP (ref 3.8–10.5)

## 2025-06-14 PROCEDURE — 93306 TTE W/DOPPLER COMPLETE: CPT | Mod: 26

## 2025-06-14 PROCEDURE — 71045 X-RAY EXAM CHEST 1 VIEW: CPT | Mod: 26

## 2025-06-14 PROCEDURE — 99233 SBSQ HOSP IP/OBS HIGH 50: CPT

## 2025-06-14 PROCEDURE — 99291 CRITICAL CARE FIRST HOUR: CPT

## 2025-06-14 PROCEDURE — 93010 ELECTROCARDIOGRAM REPORT: CPT

## 2025-06-14 RX ORDER — SENNA 187 MG
2 TABLET ORAL AT BEDTIME
Refills: 0 | Status: DISCONTINUED | OUTPATIENT
Start: 2025-06-14 | End: 2025-06-14

## 2025-06-14 RX ORDER — ISOSORBDIE DINITRATE 30 MG/1
10 TABLET ORAL THREE TIMES A DAY
Refills: 0 | Status: DISCONTINUED | OUTPATIENT
Start: 2025-06-14 | End: 2025-06-15

## 2025-06-14 RX ORDER — ASPIRIN 325 MG
81 TABLET ORAL DAILY
Refills: 0 | Status: DISCONTINUED | OUTPATIENT
Start: 2025-06-14 | End: 2025-07-03

## 2025-06-14 RX ORDER — GABAPENTIN 400 MG/1
300 CAPSULE ORAL THREE TIMES A DAY
Refills: 0 | Status: DISCONTINUED | OUTPATIENT
Start: 2025-06-14 | End: 2025-06-20

## 2025-06-14 RX ORDER — SENNA 187 MG
2 TABLET ORAL AT BEDTIME
Refills: 0 | Status: DISCONTINUED | OUTPATIENT
Start: 2025-06-14 | End: 2025-07-07

## 2025-06-14 RX ORDER — ONDANSETRON HCL/PF 4 MG/2 ML
4 VIAL (ML) INJECTION EVERY 8 HOURS
Refills: 0 | Status: DISCONTINUED | OUTPATIENT
Start: 2025-06-14 | End: 2025-07-07

## 2025-06-14 RX ORDER — IPRATROPIUM BROMIDE AND ALBUTEROL SULFATE .5; 2.5 MG/3ML; MG/3ML
3 SOLUTION RESPIRATORY (INHALATION) EVERY 6 HOURS
Refills: 0 | Status: DISCONTINUED | OUTPATIENT
Start: 2025-06-14 | End: 2025-06-15

## 2025-06-14 RX ORDER — POLYETHYLENE GLYCOL 3350 17 G/17G
17 POWDER, FOR SOLUTION ORAL DAILY
Refills: 0 | Status: DISCONTINUED | OUTPATIENT
Start: 2025-06-14 | End: 2025-06-14

## 2025-06-14 RX ORDER — MELATONIN 5 MG
3 TABLET ORAL AT BEDTIME
Refills: 0 | Status: DISCONTINUED | OUTPATIENT
Start: 2025-06-14 | End: 2025-06-25

## 2025-06-14 RX ORDER — INSULIN LISPRO 100 U/ML
INJECTION, SOLUTION INTRAVENOUS; SUBCUTANEOUS
Refills: 0 | Status: DISCONTINUED | OUTPATIENT
Start: 2025-06-14 | End: 2025-07-07

## 2025-06-14 RX ORDER — INSULIN LISPRO 100 U/ML
INJECTION, SOLUTION INTRAVENOUS; SUBCUTANEOUS EVERY 6 HOURS
Refills: 0 | Status: DISCONTINUED | OUTPATIENT
Start: 2025-06-14 | End: 2025-06-14

## 2025-06-14 RX ORDER — HEPARIN SODIUM 1000 [USP'U]/ML
5000 INJECTION INTRAVENOUS; SUBCUTANEOUS EVERY 8 HOURS
Refills: 0 | Status: DISCONTINUED | OUTPATIENT
Start: 2025-06-14 | End: 2025-06-14

## 2025-06-14 RX ORDER — POLYETHYLENE GLYCOL 3350 17 G/17G
17 POWDER, FOR SOLUTION ORAL AT BEDTIME
Refills: 0 | Status: DISCONTINUED | OUTPATIENT
Start: 2025-06-14 | End: 2025-07-07

## 2025-06-14 RX ORDER — BUMETANIDE 1 MG/1
2 TABLET ORAL
Qty: 20 | Refills: 0 | Status: DISCONTINUED | OUTPATIENT
Start: 2025-06-14 | End: 2025-06-14

## 2025-06-14 RX ORDER — INSULIN LISPRO 100 U/ML
INJECTION, SOLUTION INTRAVENOUS; SUBCUTANEOUS AT BEDTIME
Refills: 0 | Status: DISCONTINUED | OUTPATIENT
Start: 2025-06-14 | End: 2025-07-07

## 2025-06-14 RX ORDER — BUMETANIDE 1 MG/1
2 TABLET ORAL ONCE
Refills: 0 | Status: COMPLETED | OUTPATIENT
Start: 2025-06-14 | End: 2025-06-14

## 2025-06-14 RX ORDER — ACETAMINOPHEN 500 MG/5ML
650 LIQUID (ML) ORAL EVERY 6 HOURS
Refills: 0 | Status: DISCONTINUED | OUTPATIENT
Start: 2025-06-14 | End: 2025-07-07

## 2025-06-14 RX ORDER — MAGNESIUM, ALUMINUM HYDROXIDE 200-200 MG
30 TABLET,CHEWABLE ORAL EVERY 4 HOURS
Refills: 0 | Status: DISCONTINUED | OUTPATIENT
Start: 2025-06-14 | End: 2025-07-07

## 2025-06-14 RX ORDER — ATORVASTATIN CALCIUM 80 MG/1
80 TABLET, FILM COATED ORAL AT BEDTIME
Refills: 0 | Status: DISCONTINUED | OUTPATIENT
Start: 2025-06-14 | End: 2025-07-07

## 2025-06-14 RX ORDER — HEPARIN SODIUM 1000 [USP'U]/ML
1100 INJECTION INTRAVENOUS; SUBCUTANEOUS
Qty: 25000 | Refills: 0 | Status: DISCONTINUED | OUTPATIENT
Start: 2025-06-14 | End: 2025-06-17

## 2025-06-14 RX ADMIN — Medication 50 MILLIGRAM(S): at 05:35

## 2025-06-14 RX ADMIN — HEPARIN SODIUM 11 UNIT(S)/HR: 1000 INJECTION INTRAVENOUS; SUBCUTANEOUS at 10:45

## 2025-06-14 RX ADMIN — BUMETANIDE 2 MILLIGRAM(S): 1 TABLET ORAL at 02:03

## 2025-06-14 RX ADMIN — GABAPENTIN 300 MILLIGRAM(S): 400 CAPSULE ORAL at 13:50

## 2025-06-14 RX ADMIN — Medication 650 MILLIGRAM(S): at 19:20

## 2025-06-14 RX ADMIN — IPRATROPIUM BROMIDE AND ALBUTEROL SULFATE 3 MILLILITER(S): .5; 2.5 SOLUTION RESPIRATORY (INHALATION) at 05:38

## 2025-06-14 RX ADMIN — IPRATROPIUM BROMIDE AND ALBUTEROL SULFATE 3 MILLILITER(S): .5; 2.5 SOLUTION RESPIRATORY (INHALATION) at 11:23

## 2025-06-14 RX ADMIN — HEPARIN SODIUM 12 UNIT(S)/HR: 1000 INJECTION INTRAVENOUS; SUBCUTANEOUS at 18:23

## 2025-06-14 RX ADMIN — Medication 500 MILLIGRAM(S): at 18:22

## 2025-06-14 RX ADMIN — ISOSORBDIE DINITRATE 10 MILLIGRAM(S): 30 TABLET ORAL at 10:47

## 2025-06-14 RX ADMIN — Medication 1 APPLICATION(S): at 05:46

## 2025-06-14 RX ADMIN — ATORVASTATIN CALCIUM 80 MILLIGRAM(S): 80 TABLET, FILM COATED ORAL at 21:14

## 2025-06-14 RX ADMIN — ISOSORBDIE DINITRATE 10 MILLIGRAM(S): 30 TABLET ORAL at 15:11

## 2025-06-14 RX ADMIN — BUMETANIDE 10 MG/HR: 1 TABLET ORAL at 07:53

## 2025-06-14 RX ADMIN — HEPARIN SODIUM 11 UNIT(S)/HR: 1000 INJECTION INTRAVENOUS; SUBCUTANEOUS at 15:08

## 2025-06-14 RX ADMIN — GABAPENTIN 300 MILLIGRAM(S): 400 CAPSULE ORAL at 05:35

## 2025-06-14 RX ADMIN — Medication 50 MILLIGRAM(S): at 13:51

## 2025-06-14 RX ADMIN — HEPARIN SODIUM 12 UNIT(S)/HR: 1000 INJECTION INTRAVENOUS; SUBCUTANEOUS at 21:13

## 2025-06-14 RX ADMIN — BUMETANIDE 10 MG/HR: 1 TABLET ORAL at 02:03

## 2025-06-14 RX ADMIN — Medication 500 MILLIGRAM(S): at 05:37

## 2025-06-14 RX ADMIN — POLYETHYLENE GLYCOL 3350 17 GRAM(S): 17 POWDER, FOR SOLUTION ORAL at 21:14

## 2025-06-14 RX ADMIN — Medication 25 MILLIGRAM(S): at 02:58

## 2025-06-14 RX ADMIN — Medication 650 MILLIGRAM(S): at 18:29

## 2025-06-14 RX ADMIN — IPRATROPIUM BROMIDE AND ALBUTEROL SULFATE 3 MILLILITER(S): .5; 2.5 SOLUTION RESPIRATORY (INHALATION) at 18:22

## 2025-06-14 RX ADMIN — Medication 50 MILLIGRAM(S): at 21:14

## 2025-06-14 RX ADMIN — Medication 40 MILLIGRAM(S): at 06:59

## 2025-06-14 RX ADMIN — Medication 81 MILLIGRAM(S): at 10:47

## 2025-06-14 RX ADMIN — GABAPENTIN 300 MILLIGRAM(S): 400 CAPSULE ORAL at 21:14

## 2025-06-14 RX ADMIN — ISOSORBDIE DINITRATE 10 MILLIGRAM(S): 30 TABLET ORAL at 04:19

## 2025-06-14 RX ADMIN — Medication 2 TABLET(S): at 21:14

## 2025-06-14 RX ADMIN — HEPARIN SODIUM 5000 UNIT(S): 1000 INJECTION INTRAVENOUS; SUBCUTANEOUS at 05:42

## 2025-06-14 NOTE — CONSULT NOTE ADULT - PROBLEM SELECTOR RECOMMENDATION 2
-acute hypoxia likely i/s/o of pulmonary edema  -was on bipap - now on 2LNC  -continue with duonebs and diuresis as above  -CXR show large pleural effusion - plan for drain?

## 2025-06-14 NOTE — CHART NOTE - NSCHARTNOTEFT_GEN_A_CORE
CICU Transfer Note  Transfer from: CICU  Transfer to: Telemetry  Accepting Physician: Dr. Markham  Signout given to: Dr. Markham and floor ACP    CCU COURSE: 69F with PMHx DM, HTN, VERONIKA, sciatica, peripheral neuropathy, seizures initially presenting to [facility] on 6/10/2025 complaining of progressively worsening shortness of breath x1 month, admitted for acute decompensated heart failure exacerbation and possible pneumonia. She was receiving lasix IV for diuresis & Rocephin for PNA. Transferred to CCU at outside hospital, and subsequently transferred to [facility] CICU.     Upon arrival to [facility] CICU, patient on bipap, appears comfortable, hemodynamically stable. Lactate cleared. Off BiPAP on 2L NC satting well. On Bumex gtt for diuresis    FOR FOLLOW UP:   [] Monitor UOP  [] q8h BMP for electrolytes      PAST MEDICAL & SURGICAL HISTORY:  HTN (hypertension)  on medication      Sleep apnea  on CPAP x 10-15 years      Seizure disorder  on medication  stable no episode x 15 years      Diabetes  on medications      Morbid obesity      S/P tonsillectomy  childhood      History of hysterectomy  20 years ago      Bilateral carpal tunnel syndrome  bilateral carpal tunnel release 2016      Acquired cataract  bialteral surgery with lens implants          Vital Signs Last 24 Hrs  T(C): 36.8 (14 Jun 2025 09:00), Max: 37.7 (14 Jun 2025 04:00)  T(F): 98.2 (14 Jun 2025 09:00), Max: 99.8 (14 Jun 2025 04:00)  HR: 104 (14 Jun 2025 11:27) (101 - 110)  BP: 134/60 (14 Jun 2025 10:00) (111/56 - 156/73)  BP(mean): 87 (14 Jun 2025 10:00) (80 - 105)  RR: 25 (14 Jun 2025 10:00) (18 - 26)  SpO2: 99% (14 Jun 2025 11:27) (93% - 100%)    Parameters below as of 14 Jun 2025 11:27  Patient On (Oxygen Delivery Method): nasal cannula      I&O's Summary    13 Jun 2025 07:01  -  14 Jun 2025 07:00  --------------------------------------------------------  IN: 60 mL / OUT: 900 mL / NET: -840 mL      Allergies    shellfish (Other; Short breath)  No Known Drug Allergies    Intolerances      MEDICATIONS  (STANDING):  albuterol/ipratropium for Nebulization 3 milliLiter(s) Nebulizer every 6 hours  aspirin  chewable 81 milliGRAM(s) Oral daily  atorvastatin 80 milliGRAM(s) Oral at bedtime  bumetanide Infusion 2 mG/Hr (10 mL/Hr) IV Continuous <Continuous>  chlorhexidine 2% Cloths 1 Application(s) Topical <User Schedule>  divalproex  milliGRAM(s) Oral two times a day  gabapentin 300 milliGRAM(s) Oral three times a day  heparin  Infusion 1100 Unit(s)/Hr (11 mL/Hr) IV Continuous <Continuous>  hydrALAZINE 50 milliGRAM(s) Oral three times a day  insulin lispro (ADMELOG) corrective regimen sliding scale   SubCutaneous every 6 hours  isosorbide   dinitrate Tablet (ISORDIL) 10 milliGRAM(s) Oral three times a day  pantoprazole    Tablet 40 milliGRAM(s) Oral before breakfast  polyethylene glycol 3350 17 Gram(s) Oral at bedtime  senna 2 Tablet(s) Oral at bedtime    MEDICATIONS  (PRN):      Adult Advanced Hemodynamics Last 24 Hrs                              9.9    8.52  )-----------( 316      ( 14 Jun 2025 01:53 )             33.5     06-14    141  |  102  |  48[H]  ----------------------------<  94  4.4   |  24  |  1.30    Ca    9.7      14 Jun 2025 01:53  Phos  3.8     06-14  Mg     2.0     06-14    TPro  7.2  /  Alb  3.3  /  TBili  0.3  /  DBili  x   /  AST  9[L]  /  ALT  5[L]  /  AlkPhos  55  06-14    PT/INR - ( 14 Jun 2025 01:53 )   PT: 12.1 sec;   INR: 1.05 ratio         PTT - ( 14 Jun 2025 01:53 )  PTT:26.1 sec

## 2025-06-14 NOTE — CONSULT NOTE ADULT - PROBLEM SELECTOR RECOMMENDATION 9
-Unclear etiology of reduced LVEF. Patient states prior she never had cardiomyopathy  -TTE outside hospital show LVEF 20%. However TTE 6/14 here is 30-35%  -GDMT: on HDZN 50mg TID. Once labs permit - can initiate ARB/ARNi/MRA/SGLT2i/BB  -Diuretics: on bumex gtt 2/hr -> can transition to intermittment IVP  -Strict I/Os and daily weights  -Keep K>4 and Mg> 2  -Once more euvolemic and renal levels permit - would need a LHC/RHC to assess the cardiomyopathy.

## 2025-06-14 NOTE — H&P ADULT - ATTENDING COMMENTS
Acute decompensated systolic heart failure  Large pleural effusion noted  Repeat TTE  Heart Failure consult evaluation - will likely need ischemic evaluation

## 2025-06-14 NOTE — CONSULT NOTE ADULT - NS ATTEND AMEND GEN_ALL_CORE FT
Ms. Waters is a 70 yo female with PMHx DM, HTN, VERONIKA, sciatica, peripheral neuropathy, seizures initially presenting to [facility] on 6/10/2025 complaining of progressively worsening shortness of breath x1 month, admitted for acute decompensated heart failure exacerbation and possible pneumonia. She was receiving lasix IV for diuresis & Rocephin for PNA. Transferred to CCU at outside hospital, and subsequently transferred to [facility] CICU. Ms. Waters is a 68 yo female with PMHx DM, HTN, VERONIKA, sciatica, peripheral neuropathy, spinal stenosis s/p laminectomy, seizures initially presenting to [facility] on 6/10/2025 complaining of progressively worsening shortness of breath x1 month, admitted for acute decompensated heart failure exacerbation and possible pneumonia. She was receiving lasix IV for diuresis & Rocephin for PNA. Transferred to CCU at outside hospital, and subsequently transferred to [facility] CICU. TTE at OSH       Assessment:   Acute systolic heart failure  Acute hypoxic respiratory failure  ?Pneumonia  Bilateral pleural effusion  Small pericardial effusion without tamponade  Left bundle branch block      Plan:   Stop bumex drip. Intermittent diuretics.   IVC collapsing. Filling pressures are likely improving.  Agree with hydralazine and isordil.   Pulmonology consult for pleural effusion and consideration for thoracentesis.   Check viral panel.   Once SOB better, she needs left and right heart cath for further evaluation. Ms. Waters is a 68 yo female with PMHx DM, HTN, VERONIKA, sciatica, peripheral neuropathy, spinal stenosis s/p laminectomy, bilateral carpel tunnel release, seizures initially presenting to [facility] on 6/10/2025 complaining of progressively worsening shortness of breath x1 month, admitted for acute decompensated heart failure exacerbation and possible pneumonia. She was receiving lasix IV for diuresis & Rocephin for PNA. Transferred to CCU at outside hospital, and subsequently transferred to [facility] CICU. TTE at OSH       Assessment:   Acute systolic heart failure  Acute hypoxic respiratory failure  ?Pneumonia  Bilateral pleural effusion  Small pericardial effusion without tamponade  Left bundle branch block  H/o spinal stenosis  H/o bilateral carpel tunnel release      Plan:   Stop bumex drip. Intermittent diuretics.   IVC collapsing. Filling pressures are likely improving.  Agree with hydralazine and isordil.   Pulmonology consult for pleural effusion and consideration for thoracentesis.   Check viral panel.   Once SOB better, she needs left and right heart cath for further evaluation.  History and imaging also concerning for amyloidosis.   Check serum light chains, SPEP, UPEP for further eval.     Further management per primary team.

## 2025-06-14 NOTE — H&P ADULT - HISTORY OF PRESENT ILLNESS
69F with PMHx DM, HTN, VERONIKA, sciatica, peripheral neuropathy, seizures initially presenting to [facility] on 6/10/2025 complaining of progressively worsening shortness of breath x1 month, admitted for acute decompensated heart failure exacerbation and possible pneumonia. She was receiving lasix IV for diuresis & Rocephin for PNA. Transferred to CCU at outside hospital, and subsequently transferred to [facility] CICU.     Upon arrival to [facility] CICU, patient on bipap, appears comfortable, hemodynamically stable.

## 2025-06-14 NOTE — CONSULT NOTE ADULT - SUBJECTIVE AND OBJECTIVE BOX
HPI:  69F with PMHx DM, HTN, VERONIKA, sciatica, peripheral neuropathy, seizures initially presenting to [facility] on 6/10/2025 complaining of progressively worsening shortness of breath x1 month, admitted for acute decompensated heart failure exacerbation and possible pneumonia. She was receiving lasix IV for diuresis & Rocephin for PNA. Transferred to CCU at outside hospital, and subsequently transferred to [facility] CICU.     Upon arrival to [facility] CICU, patient on bipap, appears comfortable, hemodynamically stable.  (14 Jun 2025 01:27)      PAST MEDICAL & SURGICAL HISTORY:  HTN (hypertension)  on medication      Sleep apnea  on CPAP x 10-15 years      Seizure disorder  on medication  stable no episode x 15 years      Diabetes  on medications      Morbid obesity      S/P tonsillectomy  childhood      History of hysterectomy  20 years ago      Bilateral carpal tunnel syndrome  bilateral carpal tunnel release 2016      Acquired cataract  bialteral surgery with lens implants          Review of Systems:  14 point ROS done and found to be negative or noncontributory other than noted in HPI.    MEDICATIONS  (STANDING):  albuterol/ipratropium for Nebulization 3 milliLiter(s) Nebulizer every 6 hours  aspirin  chewable 81 milliGRAM(s) Oral daily  atorvastatin 80 milliGRAM(s) Oral at bedtime  bumetanide Infusion 2 mG/Hr (10 mL/Hr) IV Continuous <Continuous>  divalproex  milliGRAM(s) Oral two times a day  gabapentin 300 milliGRAM(s) Oral three times a day  heparin  Infusion 1100 Unit(s)/Hr (11 mL/Hr) IV Continuous <Continuous>  hydrALAZINE 50 milliGRAM(s) Oral three times a day  insulin lispro (ADMELOG) corrective regimen sliding scale   SubCutaneous every 6 hours  isosorbide   dinitrate Tablet (ISORDIL) 10 milliGRAM(s) Oral three times a day  pantoprazole    Tablet 40 milliGRAM(s) Oral before breakfast  polyethylene glycol 3350 17 Gram(s) Oral at bedtime  senna 2 Tablet(s) Oral at bedtime    MEDICATIONS  (PRN):  acetaminophen     Tablet .. 650 milliGRAM(s) Oral every 6 hours PRN Temp greater or equal to 38C (100.4F), Mild Pain (1 - 3)  aluminum hydroxide/magnesium hydroxide/simethicone Suspension 30 milliLiter(s) Oral every 4 hours PRN Dyspepsia  melatonin 3 milliGRAM(s) Oral at bedtime PRN Insomnia  ondansetron Injectable 4 milliGRAM(s) IV Push every 8 hours PRN Nausea and/or Vomiting      HOME MEDICATIONS:    Allergies    shellfish (Other; Short breath)  No Known Drug Allergies    Intolerances        SOCIAL HISTORY:    FAMILY HISTORY:      Vital Signs Last 24 Hrs  T(C): 36.8 (14 Jun 2025 09:00), Max: 37.7 (14 Jun 2025 04:00)  T(F): 98.2 (14 Jun 2025 09:00), Max: 99.8 (14 Jun 2025 04:00)  HR: 101 (14 Jun 2025 13:00) (101 - 110)  BP: 131/69 (14 Jun 2025 13:00) (107/56 - 156/73)  BP(mean): 93 (14 Jun 2025 13:00) (79 - 105)  RR: 22 (14 Jun 2025 13:00) (16 - 26)  SpO2: 97% (14 Jun 2025 13:00) (93% - 100%)    Parameters below as of 14 Jun 2025 13:00  Patient On (Oxygen Delivery Method): nasal cannula  O2 Flow (L/min): 2      Tele: -110    General: No distress. Comfortable.  HEENT: EOM intact.  Neck: Neck supple. JVP not elevated. No masses  Chest: Clear to auscultation bilaterally  CV: Normal S1 and S2. No murmurs, rub, or gallops. Radial pulses normal.  Abdomen: Soft, non-distended, non-tender  Skin: No rashes or skin breakdown  Neurology: Alert and oriented times three. Sensation intact  Psych: Affect normal    LABS:                        9.9    8.52  )-----------( 316      ( 14 Jun 2025 01:53 )             33.5     06-14    141  |  102  |  48[H]  ----------------------------<  94  4.4   |  24  |  1.30    Ca    9.7      14 Jun 2025 01:53  Phos  3.8     06-14  Mg     2.0     06-14    TPro  7.2  /  Alb  3.3  /  TBili  0.3  /  DBili  x   /  AST  9[L]  /  ALT  5[L]  /  AlkPhos  55  06-14    PT/INR - ( 14 Jun 2025 01:53 )   PT: 12.1 sec;   INR: 1.05 ratio         PTT - ( 14 Jun 2025 01:53 )  PTT:26.1 sec  Urinalysis Basic - ( 14 Jun 2025 01:53 )    Color: x / Appearance: x / SG: x / pH: x  Gluc: 94 mg/dL / Ketone: x  / Bili: x / Urobili: x   Blood: x / Protein: x / Nitrite: x   Leuk Esterase: x / RBC: x / WBC x   Sq Epi: x / Non Sq Epi: x / Bacteria: x        RADIOLOGY & ADDITIONAL STUDIES:

## 2025-06-14 NOTE — H&P ADULT - ASSESSMENT
Assessment: 69F PMHx DM, HTN, VERONIKA, sciatica, peripheral neuropathy, seizures p/t [facility] complaining of SOB, admitted for ADHF exacerbation, transferred to [facility] CICU.     Plan:  NEURO  #Seizure hx  - continue home dose depakote    #Peripheral neuropathy (diabetic)   - continue home dose gabapentin  - continue to monitor mental status as per protocol     RESPIRATORY  #Acute hypoxic/hypercarbic respiratory failure  - likely i/s/o pulmonary edema  - continue bipap overnight  - wean to HFNC in AM  - trend ABGs  - continue duonebs q6hr  - continue to monitor SpO2 with goal >94%    CARDIO  #Acute decompensated heart failure exacerbation  - TTE at OSH showing newly reduced EF of 20%  - repeat TTE ordered  - preload reduction: Bumex 2 IVP + Bumex gtt at 2mg/hr  - afterload reduction: Hydralazine 25 TID, uptitrate as BP tolerates  - monitor end organ perfusion indices    HEMATO  - Monitor H/H and plts  - DVT PPX: HSQ    RENAL/  #MIKAELA  - Cr uptrending  - Continue monitoring urine output, lytes, SCr/ BUN  - replete lytes prn with goal K >4 and Mg >2    GI  NPO while on bipap   bowel regimen    ENDO  #DM  - ISS q6 while NPO  - takes metformin at home  - follow up A1c, TSH, lipids    ID  #Afebrile  - monitor ABC  - was being treated for possible PNA at OSH, however likely pulmonary edema i/s/o volume overload  - monitor and trend WBC and temperature curve     Dispo: Maintain in ICU.    I have personally provided 35 minutes of direct critical care time, excluding time spent on separate procedures, in addition to the CICU Attending Dr. Jimenez.

## 2025-06-14 NOTE — PATIENT PROFILE ADULT - VISION (WITH CORRECTIVE LENSES IF THE PATIENT USUALLY WEARS THEM):
weqrs eyeglasses/Normal vision: sees adequately in most situations; can see medication labels, newsprint

## 2025-06-14 NOTE — PROGRESS NOTE ADULT - SUBJECTIVE AND OBJECTIVE BOX
SHREE WYATT  MRN-[id number]  Patient is a 69y old  Female who presents with a chief complaint of Acute decompensated heart failure (14 Jun 2025 01:27)    HPI:  69F with PMHx DM, HTN, VERONIKA, sciatica, peripheral neuropathy, seizures initially presenting to [facility] on 6/10/2025 complaining of progressively worsening shortness of breath x1 month, admitted for acute decompensated heart failure exacerbation and possible pneumonia. She was receiving lasix IV for diuresis & Rocephin for PNA. Transferred to CCU at outside hospital, and subsequently transferred to [facility] CICU.     Upon arrival to [facility] CICU, patient on bipap, appears comfortable, hemodynamically stable.  (14 Jun 2025 01:27)      24 HOUR EVENTS:  - Off Bipap and currently on room air  - on bumex gtt 2    REVIEW OF SYSTEMS:  CONSTITUTIONAL: No weakness, fevers or chills  EYES/ENT: No visual changes;  No vertigo or throat pain   NECK: No pain or stiffness  RESPIRATORY: No cough, wheezing, hemoptysis; No shortness of breath  CARDIOVASCULAR: No chest pain or palpitations  GASTROINTESTINAL: No abdominal or epigastric pain. No nausea, vomiting, or hematemesis; No diarrhea or constipation. No melena or hematochezia.  GENITOURINARY: No dysuria, frequency or hematuria  NEUROLOGICAL: No numbness or weakness  SKIN: No itching, rashes      ICU Vital Signs Last 24 Hrs  T(C): 37.7 (14 Jun 2025 04:00), Max: 37.7 (14 Jun 2025 04:00)  T(F): 99.8 (14 Jun 2025 04:00), Max: 99.8 (14 Jun 2025 04:00)  HR: 105 (14 Jun 2025 07:00) (103 - 110)  BP: 111/56 (14 Jun 2025 07:00) (111/56 - 156/73)  BP(mean): 80 (14 Jun 2025 07:00) (80 - 105)  RR: 22 (14 Jun 2025 07:00) (22 - 26)  SpO2: 96% (14 Jun 2025 07:00) (93% - 100%)    O2 Parameters below as of 14 Jun 2025 07:00  Patient On (Oxygen Delivery Method): nasal cannula  O2 Flow (L/min): 2      I&O's Summary    13 Jun 2025 07:01  -  14 Jun 2025 07:00  --------------------------------------------------------  IN: 60 mL / OUT: 900 mL / NET: -840 mL    POCT Blood Glucose.: 94 mg/dL (14 Jun 2025 05:41)      PHYSICAL EXAM:  GENERAL: No acute distress, well-developed  HEAD:  Atraumatic, Normocephalic  EYES: EOMI, PERRLA, conjunctiva and sclera clear  NECK: Supple, no lymphadenopathy, no JVD  CHEST/LUNG: CTAB; No wheezes, rales, or rhonchi  HEART: Regular rate and rhythm. Normal S1/S2. No murmurs, rubs, or gallops  ABDOMEN: Soft, non-tender, non-distended; normal bowel sounds, no organomegaly  EXTREMITIES:  2+ peripheral pulses b/l, No clubbing, cyanosis, or edema  NEUROLOGY: A&O x 3, no focal deficits  SKIN: No rashes or lesions    ============================I/O===========================   I&O's Detail    13 Jun 2025 07:01  -  14 Jun 2025 07:00  --------------------------------------------------------  IN:    Bumetanide: 60 mL  Total IN: 60 mL    OUT:    Voided (mL): 900 mL  Total OUT: 900 mL    Total NET: -840 mL        ============================ LABS =========================                        9.9    8.52  )-----------( 316      ( 14 Jun 2025 01:53 )             33.5     06-14    141  |  102  |  48[H]  ----------------------------<  94  4.4   |  24  |  1.30    Ca    9.7      14 Jun 2025 01:53  Phos  3.8     06-14  Mg     2.0     06-14    TPro  7.2  /  Alb  3.3  /  TBili  0.3  /  DBili  x   /  AST  9[L]  /  ALT  5[L]  /  AlkPhos  55  06-14      LIVER FUNCTIONS - ( 14 Jun 2025 01:53 )  Alb: 3.3 g/dL / Pro: 7.2 g/dL / ALK PHOS: 55 U/L / ALT: 5 U/L / AST: 9 U/L / GGT: x           PT/INR - ( 14 Jun 2025 01:53 )   PT: 12.1 sec;   INR: 1.05 ratio         PTT - ( 14 Jun 2025 01:53 )  PTT:26.1 sec  ABG - ( 14 Jun 2025 01:40 )  pH, Arterial: 7.33  pH, Blood: x     /  pCO2: 54    /  pO2: 99    / HCO3: 28    / Base Excess: 1.8   /  SaO2: 99.3        Lactate, Blood: 1.3 mmol/L (06-14-25 @ 05:49)  Lactate, Blood: 2.4 mmol/L (06-14-25 @ 01:53)  Blood Gas Arterial, Lactate: 0.8 mmol/L (06-14-25 @ 01:40)    Urinalysis Basic - ( 14 Jun 2025 01:53 )    Color: x / Appearance: x / SG: x / pH: x  Gluc: 94 mg/dL / Ketone: x  / Bili: x / Urobili: x   Blood: x / Protein: x / Nitrite: x   Leuk Esterase: x / RBC: x / WBC x   Sq Epi: x / Non Sq Epi: x / Bacteria: x      ======================Micro/Rad/Cardio=================  Telemetry: Reviewed   EKG: Reviewed  CXR: Reviewed  Culture: Reviewed   Echo:   Cath:

## 2025-06-14 NOTE — CONSULT NOTE ADULT - ASSESSMENT
Outpatient Cardiologist: Dr. Radha Wasserman     Ms Waters is a 69 yr old F, with PMH of HTN, DM2, VERONIKA, sciatica, peripheral neuropathy, hx of seizure initially presented to [facility] on 6/10/25 with progressively worsening SOB, admitted for ADHF and possible pneumonia. Received IV diuretics (lasix) and antibiotics for pneumonia and was transferred to CCU. Ultimately transferred to [facility] CICU for further management. On arrival, she was on bipap.    Seen today, she is off bipap and is on 2LNC and was started on bumex gtt for more diuresis. TTE 6/14 this AM shows LVEF 30-35% and will continue to optimize her with her volume status and GDMT    Cardiac Studies:  -TTE 6/14/25: LVEF 30-35%, nml RV, TAPSE 2.0cm, nml LA/RA, no AR, no MR, trace TR, IVC nml 1.4cm  -TTE OSH: LVEF 20% - need official records

## 2025-06-14 NOTE — H&P ADULT - NSHPPHYSICALEXAM_GEN_ALL_CORE
General: obese, NAD, on bipap  Neuro: A&Ox3, nonfocal  Respiratory: Breath sounds CTA in all lung fields b/l.   CV: RRR, S1, S2. No murmurs, rubs or gallops.  Abdominal: Soft, non-tender, non-distended. normoactive BS  Extremities: 1+ peripheral edema, 2+ peripheral pulses in UE/LE b/l

## 2025-06-14 NOTE — PATIENT PROFILE ADULT - FALL HARM RISK - HARM RISK INTERVENTIONS

## 2025-06-15 DIAGNOSIS — I25.10 ATHEROSCLEROTIC HEART DISEASE OF NATIVE CORONARY ARTERY WITHOUT ANGINA PECTORIS: ICD-10-CM

## 2025-06-15 DIAGNOSIS — G62.9 POLYNEUROPATHY, UNSPECIFIED: ICD-10-CM

## 2025-06-15 DIAGNOSIS — Z29.9 ENCOUNTER FOR PROPHYLACTIC MEASURES, UNSPECIFIED: ICD-10-CM

## 2025-06-15 DIAGNOSIS — G47.33 OBSTRUCTIVE SLEEP APNEA (ADULT) (PEDIATRIC): ICD-10-CM

## 2025-06-15 LAB
ALBUMIN SERPL ELPH-MCNC: 3 G/DL — LOW (ref 3.3–5)
ALP SERPL-CCNC: 47 U/L — SIGNIFICANT CHANGE UP (ref 40–120)
ALT FLD-CCNC: 5 U/L — LOW (ref 10–45)
AMMONIA BLD-MCNC: 50 UMOL/L — SIGNIFICANT CHANGE UP (ref 11–55)
ANION GAP SERPL CALC-SCNC: 12 MMOL/L — SIGNIFICANT CHANGE UP (ref 5–17)
ANION GAP SERPL CALC-SCNC: 13 MMOL/L — SIGNIFICANT CHANGE UP (ref 5–17)
APTT BLD: 43.1 SEC — HIGH (ref 26.1–36.8)
APTT BLD: 43.6 SEC — HIGH (ref 26.1–36.8)
APTT BLD: 58.4 SEC — HIGH (ref 26.1–36.8)
APTT BLD: 71.4 SEC — HIGH (ref 26.1–36.8)
AST SERPL-CCNC: 6 U/L — LOW (ref 10–40)
BILIRUB SERPL-MCNC: 0.2 MG/DL — SIGNIFICANT CHANGE UP (ref 0.2–1.2)
BUN SERPL-MCNC: 53 MG/DL — HIGH (ref 7–23)
BUN SERPL-MCNC: 56 MG/DL — HIGH (ref 7–23)
CALCIUM SERPL-MCNC: 9.8 MG/DL — SIGNIFICANT CHANGE UP (ref 8.4–10.5)
CALCIUM SERPL-MCNC: 9.8 MG/DL — SIGNIFICANT CHANGE UP (ref 8.4–10.5)
CHLORIDE SERPL-SCNC: 100 MMOL/L — SIGNIFICANT CHANGE UP (ref 96–108)
CHLORIDE SERPL-SCNC: 99 MMOL/L — SIGNIFICANT CHANGE UP (ref 96–108)
CO2 SERPL-SCNC: 26 MMOL/L — SIGNIFICANT CHANGE UP (ref 22–31)
CO2 SERPL-SCNC: 28 MMOL/L — SIGNIFICANT CHANGE UP (ref 22–31)
CREAT SERPL-MCNC: 1.32 MG/DL — HIGH (ref 0.5–1.3)
CREAT SERPL-MCNC: 1.33 MG/DL — HIGH (ref 0.5–1.3)
EGFR: 43 ML/MIN/1.73M2 — LOW
FLUAV AG NPH QL: SIGNIFICANT CHANGE UP
FLUBV AG NPH QL: SIGNIFICANT CHANGE UP
GAS PNL BLDV: SIGNIFICANT CHANGE UP
GLUCOSE BLDC GLUCOMTR-MCNC: 106 MG/DL — HIGH (ref 70–99)
GLUCOSE BLDC GLUCOMTR-MCNC: 163 MG/DL — HIGH (ref 70–99)
GLUCOSE BLDC GLUCOMTR-MCNC: 217 MG/DL — HIGH (ref 70–99)
GLUCOSE BLDC GLUCOMTR-MCNC: 98 MG/DL — SIGNIFICANT CHANGE UP (ref 70–99)
GLUCOSE SERPL-MCNC: 108 MG/DL — HIGH (ref 70–99)
GLUCOSE SERPL-MCNC: 183 MG/DL — HIGH (ref 70–99)
HCT VFR BLD CALC: 31 % — LOW (ref 34.5–45)
HGB BLD-MCNC: 9.3 G/DL — LOW (ref 11.5–15.5)
MAGNESIUM SERPL-MCNC: 1.9 MG/DL — SIGNIFICANT CHANGE UP (ref 1.6–2.6)
MAGNESIUM SERPL-MCNC: 1.9 MG/DL — SIGNIFICANT CHANGE UP (ref 1.6–2.6)
MCHC RBC-ENTMCNC: 28.1 PG — SIGNIFICANT CHANGE UP (ref 27–34)
MCHC RBC-ENTMCNC: 30 G/DL — LOW (ref 32–36)
MCV RBC AUTO: 93.7 FL — SIGNIFICANT CHANGE UP (ref 80–100)
NRBC BLD AUTO-RTO: 0 /100 WBCS — SIGNIFICANT CHANGE UP (ref 0–0)
PHOSPHATE SERPL-MCNC: 3.3 MG/DL — SIGNIFICANT CHANGE UP (ref 2.5–4.5)
PLATELET # BLD AUTO: 320 K/UL — SIGNIFICANT CHANGE UP (ref 150–400)
POTASSIUM SERPL-MCNC: 4.2 MMOL/L — SIGNIFICANT CHANGE UP (ref 3.5–5.3)
POTASSIUM SERPL-MCNC: 4.6 MMOL/L — SIGNIFICANT CHANGE UP (ref 3.5–5.3)
POTASSIUM SERPL-SCNC: 4.2 MMOL/L — SIGNIFICANT CHANGE UP (ref 3.5–5.3)
POTASSIUM SERPL-SCNC: 4.6 MMOL/L — SIGNIFICANT CHANGE UP (ref 3.5–5.3)
PROT SERPL-MCNC: 6.5 G/DL — SIGNIFICANT CHANGE UP (ref 6–8.3)
PROT SERPL-MCNC: 6.5 G/DL — SIGNIFICANT CHANGE UP (ref 6–8.3)
PROT SERPL-MCNC: 7 G/DL — SIGNIFICANT CHANGE UP (ref 6–8.3)
RBC # BLD: 3.31 M/UL — LOW (ref 3.8–5.2)
RBC # FLD: 17.5 % — HIGH (ref 10.3–14.5)
RSV RNA NPH QL NAA+NON-PROBE: SIGNIFICANT CHANGE UP
SARS-COV-2 RNA SPEC QL NAA+PROBE: SIGNIFICANT CHANGE UP
SODIUM SERPL-SCNC: 139 MMOL/L — SIGNIFICANT CHANGE UP (ref 135–145)
SODIUM SERPL-SCNC: 139 MMOL/L — SIGNIFICANT CHANGE UP (ref 135–145)
SOURCE RESPIRATORY: SIGNIFICANT CHANGE UP
WBC # BLD: 7.23 K/UL — SIGNIFICANT CHANGE UP (ref 3.8–10.5)
WBC # FLD AUTO: 7.23 K/UL — SIGNIFICANT CHANGE UP (ref 3.8–10.5)

## 2025-06-15 PROCEDURE — 99223 1ST HOSP IP/OBS HIGH 75: CPT | Mod: GC

## 2025-06-15 PROCEDURE — 99233 SBSQ HOSP IP/OBS HIGH 50: CPT

## 2025-06-15 PROCEDURE — 99223 1ST HOSP IP/OBS HIGH 75: CPT

## 2025-06-15 RX ORDER — SACUBITRIL AND VALSARTAN 6; 6 MG/1; MG/1
1 PELLET ORAL
Refills: 0 | Status: DISCONTINUED | OUTPATIENT
Start: 2025-06-15 | End: 2025-06-20

## 2025-06-15 RX ORDER — BUMETANIDE 1 MG/1
2 TABLET ORAL DAILY
Refills: 0 | Status: DISCONTINUED | OUTPATIENT
Start: 2025-06-15 | End: 2025-06-20

## 2025-06-15 RX ADMIN — Medication 40 MILLIGRAM(S): at 05:25

## 2025-06-15 RX ADMIN — SACUBITRIL AND VALSARTAN 1 TABLET(S): 6; 6 PELLET ORAL at 17:11

## 2025-06-15 RX ADMIN — GABAPENTIN 300 MILLIGRAM(S): 400 CAPSULE ORAL at 21:19

## 2025-06-15 RX ADMIN — BUMETANIDE 2 MILLIGRAM(S): 1 TABLET ORAL at 17:11

## 2025-06-15 RX ADMIN — IPRATROPIUM BROMIDE AND ALBUTEROL SULFATE 3 MILLILITER(S): .5; 2.5 SOLUTION RESPIRATORY (INHALATION) at 00:29

## 2025-06-15 RX ADMIN — ISOSORBDIE DINITRATE 10 MILLIGRAM(S): 30 TABLET ORAL at 05:25

## 2025-06-15 RX ADMIN — Medication 81 MILLIGRAM(S): at 12:33

## 2025-06-15 RX ADMIN — Medication 2 TABLET(S): at 21:19

## 2025-06-15 RX ADMIN — Medication 500 MILLIGRAM(S): at 05:25

## 2025-06-15 RX ADMIN — GABAPENTIN 300 MILLIGRAM(S): 400 CAPSULE ORAL at 05:25

## 2025-06-15 RX ADMIN — Medication 50 MILLIGRAM(S): at 14:12

## 2025-06-15 RX ADMIN — INSULIN LISPRO 2: 100 INJECTION, SOLUTION INTRAVENOUS; SUBCUTANEOUS at 12:30

## 2025-06-15 RX ADMIN — POLYETHYLENE GLYCOL 3350 17 GRAM(S): 17 POWDER, FOR SOLUTION ORAL at 21:19

## 2025-06-15 RX ADMIN — HEPARIN SODIUM 17 UNIT(S)/HR: 1000 INJECTION INTRAVENOUS; SUBCUTANEOUS at 21:19

## 2025-06-15 RX ADMIN — IPRATROPIUM BROMIDE AND ALBUTEROL SULFATE 3 MILLILITER(S): .5; 2.5 SOLUTION RESPIRATORY (INHALATION) at 12:32

## 2025-06-15 RX ADMIN — Medication 50 MILLIGRAM(S): at 05:25

## 2025-06-15 RX ADMIN — HEPARIN SODIUM 16 UNIT(S)/HR: 1000 INJECTION INTRAVENOUS; SUBCUTANEOUS at 14:02

## 2025-06-15 RX ADMIN — Medication 500 MILLIGRAM(S): at 17:06

## 2025-06-15 RX ADMIN — HEPARIN SODIUM 13.5 UNIT(S)/HR: 1000 INJECTION INTRAVENOUS; SUBCUTANEOUS at 00:40

## 2025-06-15 RX ADMIN — ISOSORBDIE DINITRATE 10 MILLIGRAM(S): 30 TABLET ORAL at 12:33

## 2025-06-15 RX ADMIN — HEPARIN SODIUM 16 UNIT(S)/HR: 1000 INJECTION INTRAVENOUS; SUBCUTANEOUS at 07:24

## 2025-06-15 RX ADMIN — IPRATROPIUM BROMIDE AND ALBUTEROL SULFATE 3 MILLILITER(S): .5; 2.5 SOLUTION RESPIRATORY (INHALATION) at 05:24

## 2025-06-15 RX ADMIN — Medication 50 MILLIGRAM(S): at 21:19

## 2025-06-15 RX ADMIN — ATORVASTATIN CALCIUM 80 MILLIGRAM(S): 80 TABLET, FILM COATED ORAL at 21:19

## 2025-06-15 RX ADMIN — GABAPENTIN 300 MILLIGRAM(S): 400 CAPSULE ORAL at 14:12

## 2025-06-15 NOTE — PROGRESS NOTE ADULT - PROBLEM SELECTOR PLAN 1
Patient presents with new onset oxygen needs. Likely due to acute decompensated heart failure.   - Treat heart failure below   - Wean oxygen as tolerated, goal SpO2>92%  - Stop DuoNeb as no clear indication

## 2025-06-15 NOTE — CONSULT NOTE ADULT - ASSESSMENT
69F with PMHx DM, HTN, VERONIKA, sciatica, peripheral neuropathy, seizures initially presenting to [facility] on 6/10/2025 complaining of progressively worsening shortness of breath for 1 month, admitted for acute decompensated heart failure exacerbation. She was receiving lasix IV for diuresis & Rocephin for PNA. Transferred to CCU at outside hospital, and subsequently transferred to [facility] CICU. Patient was on Bumex drip now discontinued with output of 2L since admitted.     Pulmonary consulted due to hypoxemia with pleural effusions. As per HF team, this is HF of unclear cause with Ef of 30-35% with no prior TTE on chart.   Patient evaluated with POCUS with bilateral effusions, with R>L, with B lines anteriorly as well as IVC of 1.9cm suggestive of volume overload.     #HF on unclear cause  #Bilateral pleural effusions   - POCUS with evidence of persistent volume overload  - Would continue diuresis as per primary team with POCUS reassessment of change in pleural effusions   - Ischemic work up as per primary team, thoracentesis is not necessarily part of the algorithm for amyloidosis although if significant respiratory distress without improvement with diuresis could offer procedure - at this time on 2L NC   - Patient on heparin, would need to hold for 6h prior to procedure

## 2025-06-15 NOTE — PROGRESS NOTE ADULT - ASSESSMENT
69 year old female presenting with acute hypoxemic respiratory failure secondary to acute decompensated heart failure.

## 2025-06-15 NOTE — PROGRESS NOTE ADULT - PROBLEM SELECTOR PLAN 2
-acute hypoxia likely i/s/o of pulmonary edema  -was on bipap - now on 2LNC  -continue with duonebs and diuresis as above  -CXR show large pleural effusion - plan for drain? - Acute hypoxia likely i/s/o of pulmonary edema  - Was on bipap - now on 2LNC  - C/w DuoNebs & diuresis as above  - Appreciate pulm recs as above

## 2025-06-15 NOTE — PROGRESS NOTE ADULT - PROBLEM SELECTOR PLAN 6
Supposed to use CPAP at home, but does not use. VBG on 6/15/25 while sleeping shows carbon dioxide retention, likely caused obtunded status.   - BIPAP nocturnal while inpatient

## 2025-06-15 NOTE — PROGRESS NOTE ADULT - SUBJECTIVE AND OBJECTIVE BOX
[facility] Division of Hospital Medicine  Refugio Young MD  Available via MS Teams    SUBJECTIVE / OVERNIGHT EVENTS:  Overnight: No acute events.     Patient transferred from ICU service to Internal Medicine service. Patient evaluated at bedside during morning rounds. Attempted to discuss HPI and hospital course discussed with the patient in detail, but patient obtunded while I was in the room. CT head originally ordered and nursing asked to stop the heparin drip; however, later on, the nurse informed me that the patient awoken and was not obtunded at all; imaging cancelled, as was the heparin drip. Patient had been taken off oxygen supplemental by me while I was in the room, patient saturating 95% on room air at rest.     ADDITIONAL REVIEW OF SYSTEMS:  Unable to perform 14 point ROS due to obtunded status.     COORDINATION OF CARE:  Consultant Communication and Details of Discussion (where applicable): Discussed case with Dr. Man, heart failure attending, who recommended pulmonary evaluation for thoracentesis; she reports that the patient does not need any heparin drip for ACS, heparin drip is only for possible LV thrombus. She requested amyloidosis work up (SPEP, UPEP, serum free light chains). She states that she will see the patient and recommended diuresis later on.     VITAL SIGNS:  T(C): 37.1 (06-15 @ 06:00), Max: 37.4 (06-15 @ 05:15)   HR: 96   BP: 134/78   RR: 18   SpO2: 96%    PHYSICAL EXAM:  CONSTITUTIONAL: unwell appearing, breathing comfortably on room air  EYES: anicteric   HENT: oropharynx clear and moist, normocephalic  RESPIRATORY: normal respiratory effort; lungs are clear to auscultation bilaterally (no wheezes/crackles)  CARDIOVASCULAR: regular rate and rhythm, normal S1 and S2, no murmur/rub/gallop  ABDOMEN: positive bowel sounds, non-tender, non-distended, no organomegaly   : no Espinal catheter in place, no flank tenderness   MUSCULOSKELETAL:  moving all four extremities to pain   EXTREMITIES: 1+ pitting edema of bilateral lower extremities   NEUROLOGY: obtunded, oriented to self only, does not obey commands     I&O's Summary    14 Jun 2025 07:01  -  15 Ariel 2025 07:00  --------------------------------------------------------  IN: 463 mL / OUT: 1600 mL / NET: -1137 mL        LABS:                        9.3    7.23  )-----------( 320      ( 15 Ariel 2025 06:37 )             31.0     06-15    139  |  100  |  53[H]  ----------------------------<  108[H]  4.6   |  26  |  1.33[H]    Ca    9.8      15 Ariel 2025 06:35  Phos  3.3     06-15  Mg     1.9     06-15    TPro  7.0  /  Alb  3.0[L]  /  TBili  0.2  /  DBili  x   /  AST  6[L]  /  ALT  5[L]  /  AlkPhos  47  06-15    PT/INR - ( 14 Jun 2025 01:53 )   PT: 12.1 sec;   INR: 1.05 ratio         PTT - ( 15 Ariel 2025 06:36 )  PTT:43.6 sec      Urinalysis Basic - ( 15 Ariel 2025 06:35 )    Color: x / Appearance: x / SG: x / pH: x  Gluc: 108 mg/dL / Ketone: x  / Bili: x / Urobili: x   Blood: x / Protein: x / Nitrite: x   Leuk Esterase: x / RBC: x / WBC x   Sq Epi: x / Non Sq Epi: x / Bacteria: x      RADIOLOGY & ADDITIONAL TESTS:  New Imaging Personally Reviewed Today: None     MEDICATIONS  (STANDING):  albuterol/ipratropium for Nebulization 3 milliLiter(s) Nebulizer every 6 hours  aspirin  chewable 81 milliGRAM(s) Oral daily  atorvastatin 80 milliGRAM(s) Oral at bedtime  divalproex  milliGRAM(s) Oral two times a day  gabapentin 300 milliGRAM(s) Oral three times a day  heparin  Infusion 1100 Unit(s)/Hr (16 mL/Hr) IV Continuous <Continuous>  hydrALAZINE 50 milliGRAM(s) Oral three times a day  insulin lispro (ADMELOG) corrective regimen sliding scale   SubCutaneous three times a day before meals  insulin lispro (ADMELOG) corrective regimen sliding scale   SubCutaneous at bedtime  isosorbide   dinitrate Tablet (ISORDIL) 10 milliGRAM(s) Oral three times a day  pantoprazole    Tablet 40 milliGRAM(s) Oral before breakfast  polyethylene glycol 3350 17 Gram(s) Oral at bedtime  senna 2 Tablet(s) Oral at bedtime    MEDICATIONS  (PRN):  acetaminophen     Tablet .. 650 milliGRAM(s) Oral every 6 hours PRN Temp greater or equal to 38C (100.4F), Mild Pain (1 - 3)  aluminum hydroxide/magnesium hydroxide/simethicone Suspension 30 milliLiter(s) Oral every 4 hours PRN Dyspepsia  melatonin 3 milliGRAM(s) Oral at bedtime PRN Insomnia  ondansetron Injectable 4 milliGRAM(s) IV Push every 8 hours PRN Nausea and/or Vomiting       [facility] Division of Hospital Medicine  Refugio Young MD  Available via MS Teams    SUBJECTIVE / OVERNIGHT EVENTS:  Overnight: No acute events.     Patient transferred from ICU service to Internal Medicine service. Patient evaluated at bedside during morning rounds. Attempted to discuss HPI and hospital course discussed with the patient in detail, but patient obtunded while I was in the room. CT head originally ordered and nursing asked to stop the heparin drip; however, later on, the nurse informed me that the patient awoken and was not obtunded at all; imaging cancelled, as was the heparin drip. Patient had been taken off oxygen supplemental by me while I was in the room, patient saturating 95% on room air at rest. Patient evaluated later on in the afternoon and was able to discuss HPI and hospital course with me in detail. No new complaints.     ADDITIONAL REVIEW OF SYSTEMS:  14 point ROS negative unless otherwise stated above.     COORDINATION OF CARE:  Consultant Communication and Details of Discussion (where applicable): Discussed case with Dr. Man, heart failure attending, who recommended pulmonary evaluation for thoracentesis; she reports that the patient does not need any heparin drip for ACS, heparin drip is only for possible LV thrombus. She requested amyloidosis work up (SPEP, UPEP, serum free light chains). She states that she will see the patient and recommended diuresis later on.     VITAL SIGNS:  T(C): 37.1 (06-15 @ 06:00), Max: 37.4 (06-15 @ 05:15)   HR: 96   BP: 134/78   RR: 18   SpO2: 96%    PHYSICAL EXAM:  CONSTITUTIONAL: unwell appearing, breathing comfortably on room air  EYES: anicteric   HENT: oropharynx clear and moist, normocephalic  RESPIRATORY: normal respiratory effort; lungs are clear to auscultation bilaterally (no wheezes/crackles)  CARDIOVASCULAR: regular rate and rhythm, normal S1 and S2, no murmur/rub/gallop  ABDOMEN: positive bowel sounds, non-tender, non-distended, no organomegaly   : no Espinal catheter in place, no flank tenderness   MUSCULOSKELETAL:  moving all four extremities to pain   EXTREMITIES: 1+ pitting edema of bilateral lower extremities   NEUROLOGY: awake, alert, oriented to self, place, date, and situation obeys commands     I&O's Summary    14 Jun 2025 07:01  -  15 Ariel 2025 07:00  --------------------------------------------------------  IN: 463 mL / OUT: 1600 mL / NET: -1137 mL        LABS:                        9.3    7.23  )-----------( 320      ( 15 Ariel 2025 06:37 )             31.0     06-15    139  |  100  |  53[H]  ----------------------------<  108[H]  4.6   |  26  |  1.33[H]    Ca    9.8      15 Ariel 2025 06:35  Phos  3.3     06-15  Mg     1.9     06-15    TPro  7.0  /  Alb  3.0[L]  /  TBili  0.2  /  DBili  x   /  AST  6[L]  /  ALT  5[L]  /  AlkPhos  47  06-15    PT/INR - ( 14 Jun 2025 01:53 )   PT: 12.1 sec;   INR: 1.05 ratio         PTT - ( 15 Ariel 2025 06:36 )  PTT:43.6 sec      Urinalysis Basic - ( 15 Ariel 2025 06:35 )    Color: x / Appearance: x / SG: x / pH: x  Gluc: 108 mg/dL / Ketone: x  / Bili: x / Urobili: x   Blood: x / Protein: x / Nitrite: x   Leuk Esterase: x / RBC: x / WBC x   Sq Epi: x / Non Sq Epi: x / Bacteria: x      RADIOLOGY & ADDITIONAL TESTS:  New Imaging Personally Reviewed Today: None     MEDICATIONS  (STANDING):  albuterol/ipratropium for Nebulization 3 milliLiter(s) Nebulizer every 6 hours  aspirin  chewable 81 milliGRAM(s) Oral daily  atorvastatin 80 milliGRAM(s) Oral at bedtime  divalproex  milliGRAM(s) Oral two times a day  gabapentin 300 milliGRAM(s) Oral three times a day  heparin  Infusion 1100 Unit(s)/Hr (16 mL/Hr) IV Continuous <Continuous>  hydrALAZINE 50 milliGRAM(s) Oral three times a day  insulin lispro (ADMELOG) corrective regimen sliding scale   SubCutaneous three times a day before meals  insulin lispro (ADMELOG) corrective regimen sliding scale   SubCutaneous at bedtime  isosorbide   dinitrate Tablet (ISORDIL) 10 milliGRAM(s) Oral three times a day  pantoprazole    Tablet 40 milliGRAM(s) Oral before breakfast  polyethylene glycol 3350 17 Gram(s) Oral at bedtime  senna 2 Tablet(s) Oral at bedtime    MEDICATIONS  (PRN):  acetaminophen     Tablet .. 650 milliGRAM(s) Oral every 6 hours PRN Temp greater or equal to 38C (100.4F), Mild Pain (1 - 3)  aluminum hydroxide/magnesium hydroxide/simethicone Suspension 30 milliLiter(s) Oral every 4 hours PRN Dyspepsia  melatonin 3 milliGRAM(s) Oral at bedtime PRN Insomnia  ondansetron Injectable 4 milliGRAM(s) IV Push every 8 hours PRN Nausea and/or Vomiting

## 2025-06-15 NOTE — PROGRESS NOTE ADULT - PROBLEM SELECTOR PLAN 3
-has hx of DM -- takes metformin at home  -A1C on admission 5.3%  -managed by primary team. - A1C on admission 5.3%  - Takes metformin at home  - Managed by primary team.

## 2025-06-15 NOTE — PROGRESS NOTE ADULT - PROBLEM SELECTOR PLAN 2
On 6/14/25, TTE with EF 30-35% with wall motion abnormalities. Diuresed with Bumex drip earlier in hospitalization.   - Treatment with diuresis per heart failure, follow up recommendations  - Pulmonology consult on 6/15/25 (called) for possible thoracentesis, follow up recommendations   - Isordil 10 mg TID   - Hydralazine 50 mg TID   - Heparin drip for now due to possible LV thrombus on echo on 6/14/25; obtaining new partial echo; if new echo is negative for LV thrombus, can stop heparin drip

## 2025-06-15 NOTE — PROGRESS NOTE ADULT - PROBLEM SELECTOR PLAN 1
-Unclear etiology of reduced LVEF. Patient states prior she never had cardiomyopathy  -TTE outside hospital show LVEF 20%. However TTE 6/14 here is 30-35%  -GDMT: on HDZN 50mg TID. Once labs permit - can initiate ARB/ARNi/MRA/SGLT2i/BB  -Diuretics: on bumex gtt 2/hr -> can transition to intermittment IVP  -Strict I/Os and daily weights  -Keep K>4 and Mg> 2  -Once more euvolemic and renal levels permit - would need a LHC/RHC to assess the cardiomyopathy. - Etiology: unclear. Pt states she has NO prior h/o cardiomyopathy.  - TTE outside hospital show LVEF 20% but TTE 6/14 here with LVEF 30-35%.     - Will obtain records from [facility].     - Will need eventual LHC/RHC when more euvolemic & stable renal function  - GDMT:     - ARNI: c/w Entresto 24-26 mg BID.  - Afterload reduction: c/w HDZN 50 mg q8 hr. Please increase as needed for optimal afterload reduction.  - Diuretics: c/w Bumex 2 mg IVP QD.     - Strict I/Os and daily weights     - Appreciate pulm recs to determine if pleural effusions amenable for drainage  - K > 4 & Mg > 2 - Etiology: unclear. Pt states she has NO prior h/o cardiomyopathy.  - TTE outside hospital show LVEF 20% but TTE 6/14 here with LVEF 30-35%.     - Will obtain records from [facility].     - Will need eventual LHC/RHC when more euvolemic & stable renal function     - Amyloid w/u initiated (free light chains & immunofixation).  - GDMT:     - ARNI: Please start Entresto 24-26 mg BID.  - Afterload reduction: c/w HDZN 50 mg q8 hr. DC ISDN in light of starting Entresto.  - Diuretics: c/w Bumex 2 mg IVP QD.     - Strict I/Os and daily weights     - Appreciate pulm recs to determine if pleural effusions amenable for drainage  - K > 4 & Mg > 2

## 2025-06-15 NOTE — PROGRESS NOTE ADULT - SUBJECTIVE AND OBJECTIVE BOX
Subjective:    Medications:  acetaminophen     Tablet .. 650 milliGRAM(s) Oral every 6 hours PRN  albuterol/ipratropium for Nebulization 3 milliLiter(s) Nebulizer every 6 hours  aluminum hydroxide/magnesium hydroxide/simethicone Suspension 30 milliLiter(s) Oral every 4 hours PRN  aspirin  chewable 81 milliGRAM(s) Oral daily  atorvastatin 80 milliGRAM(s) Oral at bedtime  divalproex  milliGRAM(s) Oral two times a day  gabapentin 300 milliGRAM(s) Oral three times a day  heparin  Infusion 1100 Unit(s)/Hr IV Continuous <Continuous>  hydrALAZINE 50 milliGRAM(s) Oral three times a day  insulin lispro (ADMELOG) corrective regimen sliding scale   SubCutaneous three times a day before meals  insulin lispro (ADMELOG) corrective regimen sliding scale   SubCutaneous at bedtime  isosorbide   dinitrate Tablet (ISORDIL) 10 milliGRAM(s) Oral three times a day  melatonin 3 milliGRAM(s) Oral at bedtime PRN  ondansetron Injectable 4 milliGRAM(s) IV Push every 8 hours PRN  pantoprazole    Tablet 40 milliGRAM(s) Oral before breakfast  polyethylene glycol 3350 17 Gram(s) Oral at bedtime  senna 2 Tablet(s) Oral at bedtime      Physical Exam:    Vitals:  Vital Signs Last 24 Hours  T(C): 37.1 (06-15-25 @ 06:00), Max: 37.4 (06-15-25 @ 05:15)  HR: 96 (06-15-25 @ 05:15) (81 - 105)  BP: 134/78 (06-15-25 @ 05:15) (104/66 - 144/65)  RR: 18 (06-15-25 @ 05:15) (16 - 24)  SpO2: 96% (06-15-25 @ 05:15) (96% - 100%)        I&O's Summary    14 Jun 2025 07:01  -  15 Ariel 2025 07:00  --------------------------------------------------------  IN: 463 mL / OUT: 1600 mL / NET: -1137 mL        Tele:    General: No distress. Comfortable.  HEENT: EOM intact.  Neck: Neck supple. JVP not elevated. No masses  Chest: Clear to auscultation bilaterally  CV: Normal S1 and S2. No murmurs, rub, or gallops. Radial pulses normal.  Abdomen: Soft, non-distended, non-tender  Skin: No rashes or skin breakdown  Neurology: Alert and oriented times three. Sensation intact  Psych: Affect normal    Labs:                        9.3    7.23  )-----------( 320      ( 15 Ariel 2025 06:37 )             31.0     06-15    139  |  100  |  53[H]  ----------------------------<  108[H]  4.6   |  26  |  1.33[H]    Ca    9.8      15 Ariel 2025 06:35  Phos  3.3     06-15  Mg     1.9     06-15    TPro  7.0  /  Alb  3.0[L]  /  TBili  0.2  /  DBili  x   /  AST  6[L]  /  ALT  5[L]  /  AlkPhos  47  06-15    PT/INR - ( 14 Jun 2025 01:53 )   PT: 12.1 sec;   INR: 1.05 ratio         PTT - ( 15 Ariel 2025 06:36 )  PTT:43.6 sec            Lactate, Blood: 1.3 mmol/L (06-14 @ 05:49)  Lactate, Blood: 2.4 mmol/L (06-14 @ 01:53)     Subjective: Pt feels much better, reports improvement in respiratory status. She otherwise denies CP, H/A, N/V/D, abdominal pain, f/c, dizziness, orthopnea, PND.     Medications:  acetaminophen     Tablet .. 650 milliGRAM(s) Oral every 6 hours PRN  albuterol/ipratropium for Nebulization 3 milliLiter(s) Nebulizer every 6 hours  aluminum hydroxide/magnesium hydroxide/simethicone Suspension 30 milliLiter(s) Oral every 4 hours PRN  aspirin  chewable 81 milliGRAM(s) Oral daily  atorvastatin 80 milliGRAM(s) Oral at bedtime  divalproex  milliGRAM(s) Oral two times a day  gabapentin 300 milliGRAM(s) Oral three times a day  heparin  Infusion 1100 Unit(s)/Hr IV Continuous <Continuous>  hydrALAZINE 50 milliGRAM(s) Oral three times a day  insulin lispro (ADMELOG) corrective regimen sliding scale   SubCutaneous three times a day before meals  insulin lispro (ADMELOG) corrective regimen sliding scale   SubCutaneous at bedtime  isosorbide   dinitrate Tablet (ISORDIL) 10 milliGRAM(s) Oral three times a day  melatonin 3 milliGRAM(s) Oral at bedtime PRN  ondansetron Injectable 4 milliGRAM(s) IV Push every 8 hours PRN  pantoprazole    Tablet 40 milliGRAM(s) Oral before breakfast  polyethylene glycol 3350 17 Gram(s) Oral at bedtime  senna 2 Tablet(s) Oral at bedtime      Physical Exam:  General: No distress. Comfortable in the bed.  HEENT: EOM intact.  Neck: Neck supple. JVP not elevated. No masses  Chest: Respirations unlabored, speaking full sentences. Crackles to auscultation bilateral bases.  CV: Normal S1 and S2. No murmurs, rub, or gallops. Radial pulses normal.  Abdomen: Soft, non-distended, non-tender  Skin: No rashes or skin breakdown  Neurology: A&Ox3, non-focal.  Psych: Affect normal    Vitals:  Vital Signs Last 24 Hours  T(C): 37.1 (06-15-25 @ 06:00), Max: 37.4 (06-15-25 @ 05:15)  HR: 96 (06-15-25 @ 05:15) (81 - 105)  BP: 134/78 (06-15-25 @ 05:15) (104/66 - 144/65)  RR: 18 (06-15-25 @ 05:15) (16 - 24)  SpO2: 96% (06-15-25 @ 05:15) (96% - 100%)    I&O's Summary    14 Jun 2025 07:01  -  15 Ariel 2025 07:00  --------------------------------------------------------  IN: 463 mL / OUT: 1600 mL / NET: -1137 mL    Tele: SR 90s, low voltage    Labs:                        9.3    7.23  )-----------( 320      ( 15 Ariel 2025 06:37 )             31.0     06-15    139  |  100  |  53[H]  ----------------------------<  108[H]  4.6   |  26  |  1.33[H]    Ca    9.8      15 Ariel 2025 06:35  Phos  3.3     06-15  Mg     1.9     06-15    TPro  7.0  /  Alb  3.0[L]  /  TBili  0.2  /  DBili  x   /  AST  6[L]  /  ALT  5[L]  /  AlkPhos  47  06-15    PT/INR - ( 14 Jun 2025 01:53 )   PT: 12.1 sec;   INR: 1.05 ratio         PTT - ( 15 Ariel 2025 06:36 )  PTT:43.6 sec            Lactate, Blood: 1.3 mmol/L (06-14 @ 05:49)  Lactate, Blood: 2.4 mmol/L (06-14 @ 01:53)

## 2025-06-15 NOTE — PROGRESS NOTE ADULT - SUBJECTIVE AND OBJECTIVE BOX
INTERVAL HPI/OVERNIGHT EVENTS: patietn remains short of breath on minimal exertion, decreased breath sounds at bases    MEDICATIONS  (STANDING):  aspirin  chewable 81 milliGRAM(s) Oral daily  atorvastatin 80 milliGRAM(s) Oral at bedtime  bumetanide Injectable 2 milliGRAM(s) IV Push daily  divalproex  milliGRAM(s) Oral two times a day  gabapentin 300 milliGRAM(s) Oral three times a day  heparin  Infusion 1100 Unit(s)/Hr (16 mL/Hr) IV Continuous <Continuous>  hydrALAZINE 50 milliGRAM(s) Oral three times a day  insulin lispro (ADMELOG) corrective regimen sliding scale   SubCutaneous three times a day before meals  insulin lispro (ADMELOG) corrective regimen sliding scale   SubCutaneous at bedtime  pantoprazole    Tablet 40 milliGRAM(s) Oral before breakfast  polyethylene glycol 3350 17 Gram(s) Oral at bedtime  sacubitril 24 mG/valsartan 26 mG 1 Tablet(s) Oral two times a day  senna 2 Tablet(s) Oral at bedtime    MEDICATIONS  (PRN):  acetaminophen     Tablet .. 650 milliGRAM(s) Oral every 6 hours PRN Temp greater or equal to 38C (100.4F), Mild Pain (1 - 3)  aluminum hydroxide/magnesium hydroxide/simethicone Suspension 30 milliLiter(s) Oral every 4 hours PRN Dyspepsia  melatonin 3 milliGRAM(s) Oral at bedtime PRN Insomnia  ondansetron Injectable 4 milliGRAM(s) IV Push every 8 hours PRN Nausea and/or Vomiting      Allergies    shellfish (Other; Short breath)  No Known Drug Allergies    Intolerances      ROS:  General: Pt denies recent weight loss/fever/chills    Neurological: denies numbness or  sensation loss    Cardiovascular: denies chest pain/palpitations/leg edema    Respiratory and Thorax: denies SOB/cough/wheezing    Gastrointestinal: denies abdominal pain/diarrhea/constipation/bloody stool    Genitourinary: denies urinary frequency/urgency/ dysuria    Musculoskeletal: denies joint pain or swelling, denies restricted motion    Hematologic: denies abnormal bleeding  	    	  	    		        	    	            Vital Signs Last 24 Hrs  T(C): 37.1 (15 Ariel 2025 10:00), Max: 37.4 (15 Ariel 2025 05:15)  T(F): 98.7 (15 Ariel 2025 10:00), Max: 99.3 (15 Ariel 2025 05:15)  HR: 97 (15 Ariel 2025 17:12) (81 - 99)  BP: 114/69 (15 Ariel 2025 17:12) (104/66 - 134/78)  BP(mean): --  RR: 18 (15 Ariel 2025 10:00) (18 - 18)  SpO2: 96% (15 Ariel 2025 10:00) (96% - 98%)    Parameters below as of 15 Ariel 2025 10:00  Patient On (Oxygen Delivery Method): nasal cannula  O2 Flow (L/min): 2    Daily     Daily     06-14 @ 07:01  -  06-15 @ 07:00  --------------------------------------------------------  IN: 463 mL / OUT: 1600 mL / NET: -1137 mL      Physical Exam:    wdwn female  no JVD  cor RRR  lung decreased BS bilaterally at bases  abd soft   ext no edema      LABS:                        9.3    7.23  )-----------( 320      ( 15 Ariel 2025 06:37 )             31.0     06-15    139  |  100  |  53[H]  ----------------------------<  108[H]  4.6   |  26  |  1.33[H]    Ca    9.8      15 Ariel 2025 06:35  Phos  3.3     06-15  Mg     1.9     06-15    TPro  6.5  /  Alb  x   /  TBili  x   /  DBili  x   /  AST  x   /  ALT  x   /  AlkPhos  x   06-15    PT/INR - ( 14 Jun 2025 01:53 )   PT: 12.1 sec;   INR: 1.05 ratio         PTT - ( 15 Ariel 2025 13:11 )  PTT:71.4 sec  Urinalysis Basic - ( 15 Ariel 2025 06:35 )    Color: x / Appearance: x / SG: x / pH: x  Gluc: 108 mg/dL / Ketone: x  / Bili: x / Urobili: x   Blood: x / Protein: x / Nitrite: x   Leuk Esterase: x / RBC: x / WBC x   Sq Epi: x / Non Sq Epi: x / Bacteria: x        RADIOLOGY & ADDITIONAL TESTS:    TELE:    EKG:

## 2025-06-15 NOTE — PROGRESS NOTE ADULT - NS ATTEND AMEND GEN_ALL_CORE FT
Ms. Waters is a 68 yo female with PMHx DM, HTN, VERONIKA, sciatica, peripheral neuropathy, spinal stenosis s/p laminectomy, bilateral carpel tunnel release, seizures initially presenting to [facility] on 6/10/2025 complaining of progressively worsening shortness of breath x1 month, admitted for acute decompensated heart failure exacerbation and possible pneumonia. She was receiving lasix IV for diuresis & Rocephin for PNA. Transferred to CCU at outside hospital, and subsequently transferred to [facility] CICU. TTE at OSH       Assessment:   Acute systolic heart failure  Acute hypoxic respiratory failure  ?Pneumonia  Bilateral pleural effusion  Small pericardial effusion without tamponade  Left bundle branch block  H/o spinal stenosis  H/o bilateral carpel tunnel release      Plan:   Start IV bumex 2 mg daily.  Start low dose enstresto. Stop isordil.  Will add GDMT as able.    IVC collapsing on TTE.  Pulmonology consult for pleural effusion and consideration for thoracentesis.   Once SOB better, she needs left and right heart cath for further evaluation.  History and imaging also concerning for amyloidosis.   Check serum light chains, SPEP, UPEP for further eval.     Further management per primary team.

## 2025-06-15 NOTE — PROGRESS NOTE ADULT - ASSESSMENT
Ms Waters is a 69 yr old F, with PMH of HTN, DM2, VERONIKA, sciatica, peripheral neuropathy, hx of seizure initially presented to [facility] on 6/10/25 with progressively worsening SOB, admitted for ADHF and possible pneumonia. Received IV diuretics (lasix) and antibiotics for pneumonia and was transferred to CCU. Ultimately transferred to [facility] CICU for further management. Diuresed, found with LVEF 30-35% has LBBB    now off IV bumex drip but is SOB on minimal exertion CHF meds being optimized    advise    1. consider  f u CXR possible thoracentesis with effusions  2/ CHF meds as per HF team  3/ once stable eventual LHC  4/ ? CRT ICD with LBBB

## 2025-06-15 NOTE — PROGRESS NOTE ADULT - PROBLEM SELECTOR PLAN 3
Hemoglobin a1c 5.3% on admission.   - Hold home oral hypoglycemic medication   - Insulin lispro sliding scale with meals and at bedtime   - Goal glucose 140-180 while inpatient

## 2025-06-15 NOTE — CONSULT NOTE ADULT - SUBJECTIVE AND OBJECTIVE BOX
HPI:    69F with PMHx DM, HTN, VERONIKA, sciatica, peripheral neuropathy, seizures initially presenting to [facility] on 6/10/2025 complaining of progressively worsening shortness of breath for 1 month, admitted for acute decompensated heart failure exacerbation. She was receiving lasix IV for diuresis & Rocephin for PNA. Transferred to CCU at outside hospital, and subsequently transferred to [facility] CICU. Patient was on Bumex drip now discontinued with output of 2L since admitted.     Pulmonary consulted due to hypoxemia with pleural effusions. As per HF team, this is HF of unclear cause with Ef of 30-35% with no prior TTE on chart.     Patient evaluated with POCUS with bilateral effusions, with R>L, with B lines anteriorly as well as IVC of 1.9cm suggestive of volume overload.     PAST MEDICAL & SURGICAL HISTORY:  HTN (hypertension)  on medication      Sleep apnea  on CPAP x 10-15 years      Seizure disorder  on medication  stable no episode x 15 years      Diabetes  on medications      Morbid obesity      S/P tonsillectomy  childhood      History of hysterectomy  20 years ago      Bilateral carpal tunnel syndrome  bilateral carpal tunnel release 2016      Acquired cataract  bialteral surgery with lens implants          FAMILY HISTORY:      SOCIAL HISTORY:  Smoking: [ ] Never Smoked [ ] Former Smoker (__ packs x ___ years) [ ] Current Smoker  (__ packs x ___ years)  Substance Use: [ ] Never Used [ ] Used ____  EtOH Use:  Marital Status: [ ] Single [ ]  [ ]  [ ]   Sexual History:   Occupation:  Recent Travel:  Country of Birth:  Advance Directives:    Allergies    shellfish (Other; Short breath)  No Known Drug Allergies    Intolerances        HOME MEDICATIONS:  Home Medications:  amlodipine-benazepril 10 mg-20 mg oral capsule: 1 cap(s) orally once a day (01 Sep 2017 05:53)  Calcium 600+D 600 mg-200 intl units oral tablet:  orally 2 times a day (01 Sep 2017 05:53)  Depakote 500 mg oral delayed release tablet:  orally 4 times a day (01 Sep 2017 05:53)  docusate sodium 100 mg oral capsule: 1 cap(s) orally 3 times a day (01 Sep 2017 12:53)  Dutoprol 25 mg-12.5 mg oral tablet, extended release: 1 tab(s) orally 2 times a day (01 Sep 2017 05:53)  metFORMIN-pioglitazone extended release:   500/15mg orally  (01 Sep 2017 05:53)  Neurontin 300 mg oral capsule:  orally 4 times a day (01 Sep 2017 05:53)      REVIEW OF SYSTEMS:  All systems negative except as documented above.    OBJECTIVE:  ICU Vital Signs Last 24 Hrs  T(C): 37.1 (15 Ariel 2025 10:00), Max: 37.4 (15 Ariel 2025 05:15)  T(F): 98.7 (15 Ariel 2025 10:00), Max: 99.3 (15 Ariel 2025 05:15)  HR: 99 (15 Ariel 2025 14:07) (81 - 101)  BP: 125/80 (15 Ariel 2025 14:07) (104/66 - 134/78)  BP(mean): --  ABP: --  ABP(mean): --  RR: 18 (15 Ariel 2025 10:00) (18 - 19)  SpO2: 96% (15 Ariel 2025 10:00) (96% - 100%)    O2 Parameters below as of 15 Ariel 2025 10:00  Patient On (Oxygen Delivery Method): nasal cannula  O2 Flow (L/min): 2            06-14 @ 07:01  -  06-15 @ 07:00  --------------------------------------------------------  IN: 463 mL / OUT: 1600 mL / NET: -1137 mL      CAPILLARY BLOOD GLUCOSE      POCT Blood Glucose.: 217 mg/dL (15 Ariel 2025 11:40)      PHYSICAL EXAM:  General: NAD  HEENT: EOMI, sclera anicteric  Neck: supple  Cardiovascular: RR  Respiratory: CTAB, no wheezes, crackles, or rhonci  Abdomen: soft  Extremities: warm and well perfused, no edema, no clubbing  Skin: no rashes  Neurological: AOx3, no focal deficits    HOSPITAL MEDICATIONS:  Standing Meds:  albuterol/ipratropium for Nebulization 3 milliLiter(s) Nebulizer every 6 hours  aspirin  chewable 81 milliGRAM(s) Oral daily  atorvastatin 80 milliGRAM(s) Oral at bedtime  divalproex  milliGRAM(s) Oral two times a day  gabapentin 300 milliGRAM(s) Oral three times a day  heparin  Infusion 1100 Unit(s)/Hr IV Continuous <Continuous>  hydrALAZINE 50 milliGRAM(s) Oral three times a day  insulin lispro (ADMELOG) corrective regimen sliding scale   SubCutaneous three times a day before meals  insulin lispro (ADMELOG) corrective regimen sliding scale   SubCutaneous at bedtime  isosorbide   dinitrate Tablet (ISORDIL) 10 milliGRAM(s) Oral three times a day  pantoprazole    Tablet 40 milliGRAM(s) Oral before breakfast  polyethylene glycol 3350 17 Gram(s) Oral at bedtime  senna 2 Tablet(s) Oral at bedtime      PRN Meds:  acetaminophen     Tablet .. 650 milliGRAM(s) Oral every 6 hours PRN  aluminum hydroxide/magnesium hydroxide/simethicone Suspension 30 milliLiter(s) Oral every 4 hours PRN  melatonin 3 milliGRAM(s) Oral at bedtime PRN  ondansetron Injectable 4 milliGRAM(s) IV Push every 8 hours PRN      LABS:                        9.3    7.23  )-----------( 320      ( 15 Ariel 2025 06:37 )             31.0     Hgb Trend: 9.3<--, 9.2<--, 9.9<--  06-15    139  |  100  |  53[H]  ----------------------------<  108[H]  4.6   |  26  |  1.33[H]    Ca    9.8      15 Ariel 2025 06:35  Phos  3.3     06-15  Mg     1.9     06-15    TPro  7.0  /  Alb  3.0[L]  /  TBili  0.2  /  DBili  x   /  AST  6[L]  /  ALT  5[L]  /  AlkPhos  47  06-15    Creatinine Trend: 1.33<--, 1.30<--  PT/INR - ( 14 Jun 2025 01:53 )   PT: 12.1 sec;   INR: 1.05 ratio         PTT - ( 15 Ariel 2025 13:11 )  PTT:71.4 sec  Urinalysis Basic - ( 15 Ariel 2025 06:35 )    Color: x / Appearance: x / SG: x / pH: x  Gluc: 108 mg/dL / Ketone: x  / Bili: x / Urobili: x   Blood: x / Protein: x / Nitrite: x   Leuk Esterase: x / RBC: x / WBC x   Sq Epi: x / Non Sq Epi: x / Bacteria: x      Arterial Blood Gas:  06-14 @ 08:01  7.39/49/73/30/97.4/4.0  ABG lactate: --  Arterial Blood Gas:  06-14 @ 01:40  7.33/54/99/28/99.3/1.8  ABG lactate: --    Venous Blood Gas:  06-15 @ 13:11  7.41/51/62/32/94.5  VBG Lactate: 0.9      MICROBIOLOGY:       RADIOLOGY:  [x] Reviewed and interpreted by me

## 2025-06-15 NOTE — PROGRESS NOTE ADULT - PROBLEM SELECTOR PLAN 8
Unclear indication for pantoprazole; will continue for now, but discontinue if not part of home regimen.    General  - DVT prophylaxis: heparin drip    - Diet: DASH   - Medication reconciliation: incomplete, son to bring in home medications on 6/15/25 in evening   - Code: Full   - Medications: Tylenol 650 mg q6h as needed for pain, Maalox 30 mL q4h as needed for acid reflux, melatonin 3 mg bedtime as needed for insomnia, Zofran 4 mg IV q8h as needed for nausea/vomiting     Disposition  - Needed before discharge: off oxygen for 24 hours, cardiac and pulmonary work up, PT evaluation   - Anticipated discharge date: 6/17/25   - Discharge to: pending PT   - Appointments after discharge: PCP, heart failure     Plan discussed with patient in detail. Patient agrees with plan. All questions answered. Patient to update family; offered to do this myself, but the patient does not want me to disturb her son at this time.

## 2025-06-16 ENCOUNTER — RESULT REVIEW (OUTPATIENT)
Age: 70
End: 2025-06-16

## 2025-06-16 LAB
ANION GAP SERPL CALC-SCNC: 11 MMOL/L — SIGNIFICANT CHANGE UP (ref 5–17)
ANION GAP SERPL CALC-SCNC: 13 MMOL/L — SIGNIFICANT CHANGE UP (ref 5–17)
APTT BLD: 48.8 SEC — HIGH (ref 26.1–36.8)
APTT BLD: 58.2 SEC — HIGH (ref 26.1–36.8)
APTT BLD: 75.4 SEC — HIGH (ref 26.1–36.8)
BUN SERPL-MCNC: 52 MG/DL — HIGH (ref 7–23)
BUN SERPL-MCNC: 55 MG/DL — HIGH (ref 7–23)
CALCIUM SERPL-MCNC: 9.9 MG/DL — SIGNIFICANT CHANGE UP (ref 8.4–10.5)
CALCIUM SERPL-MCNC: 9.9 MG/DL — SIGNIFICANT CHANGE UP (ref 8.4–10.5)
CHLORIDE SERPL-SCNC: 100 MMOL/L — SIGNIFICANT CHANGE UP (ref 96–108)
CHLORIDE SERPL-SCNC: 98 MMOL/L — SIGNIFICANT CHANGE UP (ref 96–108)
CO2 SERPL-SCNC: 28 MMOL/L — SIGNIFICANT CHANGE UP (ref 22–31)
CO2 SERPL-SCNC: 29 MMOL/L — SIGNIFICANT CHANGE UP (ref 22–31)
CREAT SERPL-MCNC: 1.2 MG/DL — SIGNIFICANT CHANGE UP (ref 0.5–1.3)
CREAT SERPL-MCNC: 1.23 MG/DL — SIGNIFICANT CHANGE UP (ref 0.5–1.3)
EGFR: 47 ML/MIN/1.73M2 — LOW
EGFR: 47 ML/MIN/1.73M2 — LOW
EGFR: 49 ML/MIN/1.73M2 — LOW
EGFR: 49 ML/MIN/1.73M2 — LOW
GLUCOSE BLDC GLUCOMTR-MCNC: 114 MG/DL — HIGH (ref 70–99)
GLUCOSE BLDC GLUCOMTR-MCNC: 151 MG/DL — HIGH (ref 70–99)
GLUCOSE BLDC GLUCOMTR-MCNC: 153 MG/DL — HIGH (ref 70–99)
GLUCOSE BLDC GLUCOMTR-MCNC: 172 MG/DL — HIGH (ref 70–99)
GLUCOSE SERPL-MCNC: 112 MG/DL — HIGH (ref 70–99)
GLUCOSE SERPL-MCNC: 197 MG/DL — HIGH (ref 70–99)
HCT VFR BLD CALC: 31.4 % — LOW (ref 34.5–45)
HGB BLD-MCNC: 9.5 G/DL — LOW (ref 11.5–15.5)
KAPPA LC SER QL IFE: 13.83 MG/DL — HIGH (ref 0.33–1.94)
KAPPA LC SER QL IFE: 13.98 MG/DL — HIGH (ref 0.33–1.94)
KAPPA/LAMBDA FREE LIGHT CHAIN RATIO, SERUM: 0.96 RATIO — SIGNIFICANT CHANGE UP (ref 0.26–1.65)
KAPPA/LAMBDA FREE LIGHT CHAIN RATIO, SERUM: 0.97 RATIO — SIGNIFICANT CHANGE UP (ref 0.26–1.65)
LAMBDA LC SER QL IFE: 14.31 MG/DL — HIGH (ref 0.57–2.63)
LAMBDA LC SER QL IFE: 14.52 MG/DL — HIGH (ref 0.57–2.63)
MAGNESIUM SERPL-MCNC: 1.9 MG/DL — SIGNIFICANT CHANGE UP (ref 1.6–2.6)
MAGNESIUM SERPL-MCNC: 2 MG/DL — SIGNIFICANT CHANGE UP (ref 1.6–2.6)
MCHC RBC-ENTMCNC: 27.5 PG — SIGNIFICANT CHANGE UP (ref 27–34)
MCHC RBC-ENTMCNC: 30.3 G/DL — LOW (ref 32–36)
MCV RBC AUTO: 91 FL — SIGNIFICANT CHANGE UP (ref 80–100)
NRBC BLD AUTO-RTO: 0 /100 WBCS — SIGNIFICANT CHANGE UP (ref 0–0)
PLATELET # BLD AUTO: 309 K/UL — SIGNIFICANT CHANGE UP (ref 150–400)
POTASSIUM SERPL-MCNC: 4.2 MMOL/L — SIGNIFICANT CHANGE UP (ref 3.5–5.3)
POTASSIUM SERPL-MCNC: 4.3 MMOL/L — SIGNIFICANT CHANGE UP (ref 3.5–5.3)
POTASSIUM SERPL-SCNC: 4.2 MMOL/L — SIGNIFICANT CHANGE UP (ref 3.5–5.3)
POTASSIUM SERPL-SCNC: 4.3 MMOL/L — SIGNIFICANT CHANGE UP (ref 3.5–5.3)
RBC # BLD: 3.45 M/UL — LOW (ref 3.8–5.2)
RBC # FLD: 17.3 % — HIGH (ref 10.3–14.5)
SODIUM SERPL-SCNC: 138 MMOL/L — SIGNIFICANT CHANGE UP (ref 135–145)
SODIUM SERPL-SCNC: 141 MMOL/L — SIGNIFICANT CHANGE UP (ref 135–145)
WBC # BLD: 6.86 K/UL — SIGNIFICANT CHANGE UP (ref 3.8–10.5)
WBC # FLD AUTO: 6.86 K/UL — SIGNIFICANT CHANGE UP (ref 3.8–10.5)

## 2025-06-16 PROCEDURE — 99233 SBSQ HOSP IP/OBS HIGH 50: CPT | Mod: GC,25

## 2025-06-16 PROCEDURE — 93308 TTE F-UP OR LMTD: CPT | Mod: 26

## 2025-06-16 PROCEDURE — 99233 SBSQ HOSP IP/OBS HIGH 50: CPT

## 2025-06-16 PROCEDURE — 71046 X-RAY EXAM CHEST 2 VIEWS: CPT | Mod: 26

## 2025-06-16 RX ADMIN — Medication 40 MILLIGRAM(S): at 05:38

## 2025-06-16 RX ADMIN — Medication 500 MILLIGRAM(S): at 05:38

## 2025-06-16 RX ADMIN — Medication 50 MILLIGRAM(S): at 14:20

## 2025-06-16 RX ADMIN — Medication 81 MILLIGRAM(S): at 14:20

## 2025-06-16 RX ADMIN — BUMETANIDE 2 MILLIGRAM(S): 1 TABLET ORAL at 05:38

## 2025-06-16 RX ADMIN — GABAPENTIN 300 MILLIGRAM(S): 400 CAPSULE ORAL at 14:21

## 2025-06-16 RX ADMIN — INSULIN LISPRO 1: 100 INJECTION, SOLUTION INTRAVENOUS; SUBCUTANEOUS at 11:35

## 2025-06-16 RX ADMIN — Medication 50 MILLIGRAM(S): at 05:38

## 2025-06-16 RX ADMIN — GABAPENTIN 300 MILLIGRAM(S): 400 CAPSULE ORAL at 05:38

## 2025-06-16 RX ADMIN — Medication 2 TABLET(S): at 21:20

## 2025-06-16 RX ADMIN — INSULIN LISPRO 1: 100 INJECTION, SOLUTION INTRAVENOUS; SUBCUTANEOUS at 17:18

## 2025-06-16 RX ADMIN — HEPARIN SODIUM 20 UNIT(S)/HR: 1000 INJECTION INTRAVENOUS; SUBCUTANEOUS at 14:58

## 2025-06-16 RX ADMIN — ATORVASTATIN CALCIUM 80 MILLIGRAM(S): 80 TABLET, FILM COATED ORAL at 21:21

## 2025-06-16 RX ADMIN — GABAPENTIN 300 MILLIGRAM(S): 400 CAPSULE ORAL at 21:20

## 2025-06-16 RX ADMIN — HEPARIN SODIUM 19 UNIT(S)/HR: 1000 INJECTION INTRAVENOUS; SUBCUTANEOUS at 05:38

## 2025-06-16 RX ADMIN — Medication 500 MILLIGRAM(S): at 17:18

## 2025-06-16 RX ADMIN — POLYETHYLENE GLYCOL 3350 17 GRAM(S): 17 POWDER, FOR SOLUTION ORAL at 21:21

## 2025-06-16 RX ADMIN — SACUBITRIL AND VALSARTAN 1 TABLET(S): 6; 6 PELLET ORAL at 05:38

## 2025-06-16 RX ADMIN — SACUBITRIL AND VALSARTAN 1 TABLET(S): 6; 6 PELLET ORAL at 17:18

## 2025-06-16 NOTE — DIETITIAN INITIAL EVALUATION ADULT - PHYSCIAL ASSESSMENT
- Per [facility] HIE: 267lbs (6/23/2022), 215lbs (6/14/2025). 19.5% weight loss x three years noted.    - Per chart, patient's current dosing weight 215lbs (6/14/2025). Patient confirmed, and reports 97lbs intentional weight loss x one year prior to admission. 31% intentional weight loss x one year indicated.

## 2025-06-16 NOTE — PROGRESS NOTE ADULT - SUBJECTIVE AND OBJECTIVE BOX
[facility] Division of Hospital Medicine  Otilia Finn MD  MS Teams PREFERRED        SUBJECTIVE / OVERNIGHT EVENTS:  ADDITIONAL REVIEW OF SYSTEMS:    MEDICATIONS  (STANDING):  aspirin  chewable 81 milliGRAM(s) Oral daily  atorvastatin 80 milliGRAM(s) Oral at bedtime  bumetanide Injectable 2 milliGRAM(s) IV Push daily  divalproex  milliGRAM(s) Oral two times a day  gabapentin 300 milliGRAM(s) Oral three times a day  heparin  Infusion 1100 Unit(s)/Hr (19 mL/Hr) IV Continuous <Continuous>  hydrALAZINE 50 milliGRAM(s) Oral three times a day  insulin lispro (ADMELOG) corrective regimen sliding scale   SubCutaneous at bedtime  insulin lispro (ADMELOG) corrective regimen sliding scale   SubCutaneous three times a day before meals  pantoprazole    Tablet 40 milliGRAM(s) Oral before breakfast  polyethylene glycol 3350 17 Gram(s) Oral at bedtime  sacubitril 24 mG/valsartan 26 mG 1 Tablet(s) Oral two times a day  senna 2 Tablet(s) Oral at bedtime    MEDICATIONS  (PRN):  acetaminophen     Tablet .. 650 milliGRAM(s) Oral every 6 hours PRN Temp greater or equal to 38C (100.4F), Mild Pain (1 - 3)  aluminum hydroxide/magnesium hydroxide/simethicone Suspension 30 milliLiter(s) Oral every 4 hours PRN Dyspepsia  melatonin 3 milliGRAM(s) Oral at bedtime PRN Insomnia  ondansetron Injectable 4 milliGRAM(s) IV Push every 8 hours PRN Nausea and/or Vomiting      I&O's Summary    15 Ariel 2025 07:01  -  16 Jun 2025 07:00  --------------------------------------------------------  IN: 950 mL / OUT: 500 mL / NET: 450 mL        PHYSICAL EXAM:  Vital Signs Last 24 Hrs  T(C): 36.8 (16 Jun 2025 11:00), Max: 37.1 (15 Ariel 2025 20:45)  T(F): 98.2 (16 Jun 2025 11:00), Max: 98.7 (15 Ariel 2025 20:45)  HR: 87 (16 Jun 2025 11:00) (68 - 99)  BP: 115/72 (16 Jun 2025 11:00) (103/68 - 125/80)  BP(mean): --  RR: 18 (16 Jun 2025 11:00) (18 - 18)  SpO2: 99% (16 Jun 2025 11:00) (95% - 99%)    Parameters below as of 16 Jun 2025 11:00  Patient On (Oxygen Delivery Method): nasal cannula  O2 Flow (L/min): 2    CONSTITUTIONAL: NAD, well-developed, well-groomed  EYES: PERRLA; conjunctiva and sclera clear  ENMT: Moist oral mucosa, no pharyngeal injection or exudates; normal dentition  NECK: Supple, no palpable masses; no thyromegaly  RESPIRATORY: Normal respiratory effort; lungs are clear to auscultation bilaterally  CARDIOVASCULAR: Regular rate and rhythm, normal S1 and S2, no murmur/rub/gallop; No lower extremity edema; Peripheral pulses are 2+ bilaterally  ABDOMEN: Nontender to palpation, normoactive bowel sounds, no rebound/guarding; No hepatosplenomegaly  MUSCULOSKELETAL:  Normal gait; no clubbing or cyanosis of digits; no joint swelling or tenderness to palpation  PSYCH: A+O to person, place, and time; affect appropriate  NEUROLOGY: CN 2-12 are intact and symmetric; no gross sensory deficits   SKIN: No rashes; no palpable lesions    LABS:                        9.5    6.86  )-----------( 309      ( 16 Jun 2025 04:46 )             31.4     06-16    141  |  100  |  55[H]  ----------------------------<  112[H]  4.3   |  28  |  1.20    Ca    9.9      16 Jun 2025 04:46  Phos  3.3     06-15  Mg     2.0     06-16    TPro  6.5  /  Alb  x   /  TBili  x   /  DBili  x   /  AST  x   /  ALT  x   /  AlkPhos  x   06-15    PTT - ( 16 Jun 2025 04:46 )  PTT:58.2 sec      Urinalysis Basic - ( 16 Jun 2025 04:46 )    Color: x / Appearance: x / SG: x / pH: x  Gluc: 112 mg/dL / Ketone: x  / Bili: x / Urobili: x   Blood: x / Protein: x / Nitrite: x   Leuk Esterase: x / RBC: x / WBC x   Sq Epi: x / Non Sq Epi: x / Bacteria: x          RADIOLOGY & ADDITIONAL TESTS:  Results Reviewed:   Imaging Personally Reviewed:  Electrocardiogram Personally Reviewed:    COORDINATION OF CARE:  Care Discussed with Consultants/Other Providers [Y/N]:  Prior or Outpatient Records Reviewed [Y/N]:   [facility] Division of Hospital Medicine  Otilia Finn MD  MS Teams PREFERRED        SUBJECTIVE / OVERNIGHT EVENTS: Seen at bedside. NAD.     MEDICATIONS  (STANDING):  aspirin  chewable 81 milliGRAM(s) Oral daily  atorvastatin 80 milliGRAM(s) Oral at bedtime  bumetanide Injectable 2 milliGRAM(s) IV Push daily  divalproex  milliGRAM(s) Oral two times a day  gabapentin 300 milliGRAM(s) Oral three times a day  heparin  Infusion 1100 Unit(s)/Hr (19 mL/Hr) IV Continuous <Continuous>  hydrALAZINE 50 milliGRAM(s) Oral three times a day  insulin lispro (ADMELOG) corrective regimen sliding scale   SubCutaneous at bedtime  insulin lispro (ADMELOG) corrective regimen sliding scale   SubCutaneous three times a day before meals  pantoprazole    Tablet 40 milliGRAM(s) Oral before breakfast  polyethylene glycol 3350 17 Gram(s) Oral at bedtime  sacubitril 24 mG/valsartan 26 mG 1 Tablet(s) Oral two times a day  senna 2 Tablet(s) Oral at bedtime    MEDICATIONS  (PRN):  acetaminophen     Tablet .. 650 milliGRAM(s) Oral every 6 hours PRN Temp greater or equal to 38C (100.4F), Mild Pain (1 - 3)  aluminum hydroxide/magnesium hydroxide/simethicone Suspension 30 milliLiter(s) Oral every 4 hours PRN Dyspepsia  melatonin 3 milliGRAM(s) Oral at bedtime PRN Insomnia  ondansetron Injectable 4 milliGRAM(s) IV Push every 8 hours PRN Nausea and/or Vomiting      I&O's Summary    15 Ariel 2025 07:01  -  16 Jun 2025 07:00  --------------------------------------------------------  IN: 950 mL / OUT: 500 mL / NET: 450 mL        PHYSICAL EXAM:  Vital Signs Last 24 Hrs  T(C): 36.8 (16 Jun 2025 11:00), Max: 37.1 (15 Ariel 2025 20:45)  T(F): 98.2 (16 Jun 2025 11:00), Max: 98.7 (15 Ariel 2025 20:45)  HR: 87 (16 Jun 2025 11:00) (68 - 99)  BP: 115/72 (16 Jun 2025 11:00) (103/68 - 125/80)  BP(mean): --  RR: 18 (16 Jun 2025 11:00) (18 - 18)  SpO2: 99% (16 Jun 2025 11:00) (95% - 99%)    Parameters below as of 16 Jun 2025 11:00  Patient On (Oxygen Delivery Method): nasal cannula  O2 Flow (L/min): 2  PHYSICAL EXAM:  CONSTITUTIONAL: unwell appearing, breathing comfortably on room air  EYES: anicteric   HENT: oropharynx clear and moist, normocephalic  RESPIRATORY: normal respiratory effort; lungs are clear to auscultation bilaterally (no wheezes/crackles)  CARDIOVASCULAR: regular rate and rhythm, normal S1 and S2, no murmur/rub/gallop  ABDOMEN: positive bowel sounds, non-tender, non-distended, no organomegaly   : no Espinal catheter in place, no flank tenderness   MUSCULOSKELETAL:  moving all four extremities to pain   EXTREMITIES: 1+ pitting edema of bilateral lower extremities   NEUROLOGY: awake, alert, oriented to self, place, date, and situation obeys commands     LABS:                        9.5    6.86  )-----------( 309      ( 16 Jun 2025 04:46 )             31.4     06-16    141  |  100  |  55[H]  ----------------------------<  112[H]  4.3   |  28  |  1.20    Ca    9.9      16 Jun 2025 04:46  Phos  3.3     06-15  Mg     2.0     06-16    TPro  6.5  /  Alb  x   /  TBili  x   /  DBili  x   /  AST  x   /  ALT  x   /  AlkPhos  x   06-15    PTT - ( 16 Jun 2025 04:46 )  PTT:58.2 sec      Urinalysis Basic - ( 16 Jun 2025 04:46 )    Color: x / Appearance: x / SG: x / pH: x  Gluc: 112 mg/dL / Ketone: x  / Bili: x / Urobili: x   Blood: x / Protein: x / Nitrite: x   Leuk Esterase: x / RBC: x / WBC x   Sq Epi: x / Non Sq Epi: x / Bacteria: x

## 2025-06-16 NOTE — DIETITIAN INITIAL EVALUATION ADULT - ENERGY INTAKE
Per nursing flowsheets, fluctuating fair/adequate p.o. intake this admission, %. Patient reports fair appetite today, and having ~50% of breakfast. Offered oral nutrition supplement for p.o. optimization, patient accepting./Fair (50-75%)

## 2025-06-16 NOTE — DIETITIAN INITIAL EVALUATION ADULT - NSFNSADHERENCEPTAFT_GEN_A_CORE
Per chart, hx DM noted. Patient denies adhering to a therapeutic diet or checking her blood sugar prior to admission.

## 2025-06-16 NOTE — PROGRESS NOTE ADULT - ASSESSMENT
69F with PMHx DM, HTN, VERONIKA, sciatica, peripheral neuropathy, seizures initially presenting to [facility] on 6/10/2025 complaining of progressively worsening shortness of breath for 1 month, admitted for acute decompensated heart failure exacerbation. She was receiving lasix IV for diuresis & Rocephin for PNA. Transferred to CCU at outside hospital, and subsequently transferred to [facility] CICU. Patient was on Bumex drip now discontinued with output of 2L since admitted.     Pulmonary consulted due to hypoxemia with pleural effusions. As per HF team, this is HF of unclear cause with Ef of 30-35% with no prior TTE on chart.     #HF on unclear cause  #Bilateral pleural effusions   - Would continue diuresis as per primary team with POCUS reassessment of change in pleural effusions   - At this time, no respiratory distress on 2L NC with no indication of urgent therapeutic thoracentesis   - Ischemic work up as per primary team, thoracentesis is not necessarily part of the algorithm for amyloidosis although if significant respiratory distress without improvement with diuresis could offer procedure - at this time on 2L NC   - Patient on heparin, would need to hold for 6h prior to procedure

## 2025-06-16 NOTE — DIETITIAN INITIAL EVALUATION ADULT - ADD RECOMMEND
1. Continue DASH/ TLC, and recommend Consistent Carbohydrate Diet order  2. Recommend trial Ensure Max Vanilla once daily (150kcals, 30g protein) for p.o. optimization  3. Recommend Multivitamin and Ascorbic Acid to promote pressure injury healing   4. HF noted during admission, recommend daily weights   5. Monitor routine weights, nutrition and renal related labs, fingersticks, diet advancements,  p.o. intake and tolerance, oral nutrition supplement compliance, and skin integrity

## 2025-06-16 NOTE — PROGRESS NOTE ADULT - ATTENDING COMMENTS
70 yo F with PMHx DM, HTN, VERONIKA, sciatica, peripheral neuropathy, seizures initially presenting to [facility] on 6/10/2025 complaining of progressively worsening shortness of breath for 1 month, admitted for acute decompensated heart failure exacerbation. She was receiving lasix IV for diuresis & Rocephin for PNA. Transferred to CCU at outside hospital, and subsequently transferred to [facility] CICU. Patient was on Bumex drip now on Bumex IV BID. Pulmonary consulted due to hypoxemia with pleural effusions. As per HF team, this is HF of unclear cause with EF of 30-35% w/ dyskinetic apex and with no prior TTE on chart. EKG with LBBB. Unaware of any ischemic evaluation thus far, alternative ddx under consideration is amyloidosis.  Remains volume overloaded on exam.    #HF, etiology unknown  #Bilateral pleural effusions  #Volume overload    Recommend:   - will repeat POCUS to evaluate for persistence of effusions  - continue diuresis as per primary team, strict Is/Os and daily weights  - LHC?, Cardiac MRI?   - Patient on heparin, would need to hold for 6h prior to procedure   - BIPAP overnight per above  - no need for hep gtt as no e/o LV thrombus  - incentive spirometry  - PT eval, OOB to chair as tolerated

## 2025-06-16 NOTE — PHYSICAL THERAPY INITIAL EVALUATION ADULT - ADDITIONAL COMMENTS
Pt lives in 4th floor elevator apt. No steps to enter the building. Prior to admission, pt was I with all functional mobility and ADLs with straight cane or rolling walker. Pt has HHA 5 days x3 hours.

## 2025-06-16 NOTE — DIETITIAN INITIAL EVALUATION ADULT - PERTINENT LABORATORY DATA
06-16    141  |  100  |  55[H]  ----------------------------<  112[H]  4.3   |  28  |  1.20    Ca    9.9      16 Jun 2025 04:46  Phos  3.3     06-15  Mg     2.0     06-16    TPro  6.5  /  Alb  x   /  TBili  x   /  DBili  x   /  AST  x   /  ALT  x   /  AlkPhos  x   06-15  POCT Blood Glucose.: 151 mg/dL (06-16-25 @ 11:29)  A1C with Estimated Average Glucose Result: 5.3 % (06-14-25 @ 01:53)

## 2025-06-16 NOTE — DIETITIAN INITIAL EVALUATION ADULT - OTHER INFO
Pertinent History:   - PMH: DM  - Per chart, acute heart failure noted during admission    Pertinent Nutrition Related Labs:  - POCT  (H), Glucose 112 (H). Hyperglycemia noted during admission, recent fingersticks: 98-217mg/dL (6/14-6/16). HbA1c 5.3% (6/14), indicating good glycemic control  - BUN 55 (H), GFR 49 (L)  - BNP 2316 (H) 6/14    Pertinent Medications:  - Insulin (ADMELOG)  - Zofran prn

## 2025-06-16 NOTE — PROGRESS NOTE ADULT - SUBJECTIVE AND OBJECTIVE BOX
INTERVAL HPI/OVERNIGHT EVENTS: dana remains lethargic still SOB, but improving    MEDICATIONS  (STANDING):  aspirin  chewable 81 milliGRAM(s) Oral daily  atorvastatin 80 milliGRAM(s) Oral at bedtime  bumetanide Injectable 2 milliGRAM(s) IV Push daily  divalproex  milliGRAM(s) Oral two times a day  gabapentin 300 milliGRAM(s) Oral three times a day  heparin  Infusion 1100 Unit(s)/Hr (20 mL/Hr) IV Continuous <Continuous>  hydrALAZINE 50 milliGRAM(s) Oral three times a day  insulin lispro (ADMELOG) corrective regimen sliding scale   SubCutaneous three times a day before meals  insulin lispro (ADMELOG) corrective regimen sliding scale   SubCutaneous at bedtime  pantoprazole    Tablet 40 milliGRAM(s) Oral before breakfast  polyethylene glycol 3350 17 Gram(s) Oral at bedtime  sacubitril 24 mG/valsartan 26 mG 1 Tablet(s) Oral two times a day  senna 2 Tablet(s) Oral at bedtime    MEDICATIONS  (PRN):  acetaminophen     Tablet .. 650 milliGRAM(s) Oral every 6 hours PRN Temp greater or equal to 38C (100.4F), Mild Pain (1 - 3)  aluminum hydroxide/magnesium hydroxide/simethicone Suspension 30 milliLiter(s) Oral every 4 hours PRN Dyspepsia  melatonin 3 milliGRAM(s) Oral at bedtime PRN Insomnia  ondansetron Injectable 4 milliGRAM(s) IV Push every 8 hours PRN Nausea and/or Vomiting      Allergies    shellfish (Other; Short breath)  No Known Drug Allergies    Intolerances    lactose (Unknown)    ROS:  General: Pt denies recent weight loss/fever/chills    Neurological: denies numbness or  sensation loss    Cardiovascular: denies chest pain/palpitations/leg edema    Respiratory and Thorax: denies SOB/cough/wheezing    Gastrointestinal: denies abdominal pain/diarrhea/constipation/bloody stool    Genitourinary: denies urinary frequency/urgency/ dysuria    Musculoskeletal: denies joint pain or swelling, denies restricted motion    Hematologic: denies abnormal bleeding  	    	  	    		        	    	            Vital Signs Last 24 Hrs  T(C): 36.8 (16 Jun 2025 16:46), Max: 37.1 (15 Ariel 2025 20:45)  T(F): 98.3 (16 Jun 2025 16:46), Max: 98.7 (15 Ariel 2025 20:45)  HR: 91 (16 Jun 2025 16:46) (68 - 97)  BP: 100/62 (16 Jun 2025 16:46) (100/62 - 136/75)  BP(mean): --  RR: 18 (16 Jun 2025 16:46) (18 - 18)  SpO2: 96% (16 Jun 2025 16:46) (95% - 99%)    Parameters below as of 16 Jun 2025 16:46  Patient On (Oxygen Delivery Method): nasal cannula  O2 Flow (L/min): 2    Daily     Daily     06-15 @ 07:01  -  06-16 @ 07:00  --------------------------------------------------------  IN: 950 mL / OUT: 500 mL / NET: 450 mL    06-16 @ 07:01  -  06-16 @ 20:05  --------------------------------------------------------  IN: 400 mL / OUT: 0 mL / NET: 400 mL      Physical Exam:    wdwn female  JVD  cor RRR\  lung clear anteriorrly decreased BS at bases bilaterally  abd soft   ext mild edema      LABS:                        9.5    6.86  )-----------( 309      ( 16 Jun 2025 04:46 )             31.4     06-16    141  |  100  |  55[H]  ----------------------------<  112[H]  4.3   |  28  |  1.20    Ca    9.9      16 Jun 2025 04:46  Phos  3.3     06-15  Mg     2.0     06-16    TPro  6.5  /  Alb  x   /  TBili  x   /  DBili  x   /  AST  x   /  ALT  x   /  AlkPhos  x   06-15    PTT - ( 16 Jun 2025 14:10 )  PTT:48.8 sec  Urinalysis Basic - ( 16 Jun 2025 04:46 )    Color: x / Appearance: x / SG: x / pH: x  Gluc: 112 mg/dL / Ketone: x  / Bili: x / Urobili: x   Blood: x / Protein: x / Nitrite: x   Leuk Esterase: x / RBC: x / WBC x   Sq Epi: x / Non Sq Epi: x / Bacteria: x        RADIOLOGY & ADDITIONAL TESTS:    TELE:    EKG:

## 2025-06-16 NOTE — PROGRESS NOTE ADULT - SUBJECTIVE AND OBJECTIVE BOX
Interval Events:      REVIEW OF SYSTEMS:  Negative except as documented above.      OBJECTIVE:  ICU Vital Signs Last 24 Hrs  T(C): 36.8 (16 Jun 2025 11:00), Max: 37.1 (15 Ariel 2025 20:45)  T(F): 98.2 (16 Jun 2025 11:00), Max: 98.7 (15 Ariel 2025 20:45)  HR: 87 (16 Jun 2025 11:00) (68 - 99)  BP: 115/72 (16 Jun 2025 11:00) (103/68 - 125/80)  BP(mean): --  ABP: --  ABP(mean): --  RR: 18 (16 Jun 2025 11:00) (18 - 18)  SpO2: 99% (16 Jun 2025 11:00) (95% - 99%)    O2 Parameters below as of 16 Jun 2025 11:00  Patient On (Oxygen Delivery Method): nasal cannula  O2 Flow (L/min): 2            06-15 @ 07:01  -  06-16 @ 07:00  --------------------------------------------------------  IN: 950 mL / OUT: 500 mL / NET: 450 mL    06-16 @ 07:01  -  06-16 @ 12:26  --------------------------------------------------------  IN: 400 mL / OUT: 0 mL / NET: 400 mL      CAPILLARY BLOOD GLUCOSE      POCT Blood Glucose.: 151 mg/dL (16 Jun 2025 11:29)      PHYSICAL EXAM:  General: NAD  HEENT:  EOMI, sclera anicteric, moist mucus membranes  Neck: supple  Cardiovascular: RRR  Respiratory: CTAB, no wheezes, crackles, or rhonci  Abdomen: soft, nontender  Extremities: warm and well perfused, no edema, no clubbing  Skin: no rashes  Neurological: no focal deficits    HOSPITAL MEDICATIONS:  MEDICATIONS  (STANDING):  aspirin  chewable 81 milliGRAM(s) Oral daily  atorvastatin 80 milliGRAM(s) Oral at bedtime  bumetanide Injectable 2 milliGRAM(s) IV Push daily  divalproex  milliGRAM(s) Oral two times a day  gabapentin 300 milliGRAM(s) Oral three times a day  heparin  Infusion 1100 Unit(s)/Hr (19 mL/Hr) IV Continuous <Continuous>  hydrALAZINE 50 milliGRAM(s) Oral three times a day  insulin lispro (ADMELOG) corrective regimen sliding scale   SubCutaneous at bedtime  insulin lispro (ADMELOG) corrective regimen sliding scale   SubCutaneous three times a day before meals  pantoprazole    Tablet 40 milliGRAM(s) Oral before breakfast  polyethylene glycol 3350 17 Gram(s) Oral at bedtime  sacubitril 24 mG/valsartan 26 mG 1 Tablet(s) Oral two times a day  senna 2 Tablet(s) Oral at bedtime    MEDICATIONS  (PRN):  acetaminophen     Tablet .. 650 milliGRAM(s) Oral every 6 hours PRN Temp greater or equal to 38C (100.4F), Mild Pain (1 - 3)  aluminum hydroxide/magnesium hydroxide/simethicone Suspension 30 milliLiter(s) Oral every 4 hours PRN Dyspepsia  melatonin 3 milliGRAM(s) Oral at bedtime PRN Insomnia  ondansetron Injectable 4 milliGRAM(s) IV Push every 8 hours PRN Nausea and/or Vomiting      LABS:                        9.5    6.86  )-----------( 309      ( 16 Jun 2025 04:46 )             31.4     Hgb Trend: 9.5<--, 9.3<--, 9.2<--, 9.9<--  06-16    141  |  100  |  55[H]  ----------------------------<  112[H]  4.3   |  28  |  1.20    Ca    9.9      16 Jun 2025 04:46  Phos  3.3     06-15  Mg     2.0     06-16    TPro  6.5  /  Alb  x   /  TBili  x   /  DBili  x   /  AST  x   /  ALT  x   /  AlkPhos  x   06-15    Creatinine Trend: 1.20<--, 1.32<--, 1.33<--, 1.30<--  PTT - ( 16 Jun 2025 04:46 )  PTT:58.2 sec  Urinalysis Basic - ( 16 Jun 2025 04:46 )    Color: x / Appearance: x / SG: x / pH: x  Gluc: 112 mg/dL / Ketone: x  / Bili: x / Urobili: x   Blood: x / Protein: x / Nitrite: x   Leuk Esterase: x / RBC: x / WBC x   Sq Epi: x / Non Sq Epi: x / Bacteria: x        Venous Blood Gas:  06-15 @ 13:11  7.41/51/62/32/94.5  VBG Lactate: 0.9      MICROBIOLOGY:       RADIOLOGY:  [x] Reviewed and interpreted by me

## 2025-06-16 NOTE — PROGRESS NOTE ADULT - PROBLEM SELECTOR PLAN 8
Patient is in the process of establishing care with [facility] for behavioral health management.   I stressed with her the importance of following through with process and keeping regular visits with the specialist. Unclear indication for pantoprazole; will continue for now, but discontinue if not part of home regimen.    General  - DVT prophylaxis: heparin drip    - Diet: DASH   - Medication reconciliation: incomplete, son to bring in home medications on 6/15/25 in evening   - Code: Full   - Medications: Tylenol 650 mg q6h as needed for pain, Maalox 30 mL q4h as needed for acid reflux, melatonin 3 mg bedtime as needed for insomnia, Zofran 4 mg IV q8h as needed for nausea/vomiting     Disposition  - Needed before discharge: off oxygen for 24 hours, cardiac and pulmonary work up, PT evaluation   - Anticipated discharge date: 6/17/25   - Discharge to: pending PT   - Appointments after discharge: PCP, heart failure     Plan discussed with patient in detail. Patient agrees with plan. All questions answered. Patient to update family; offered to do this myself, but the patient does not want me to disturb her son at this time. Unclear indication for pantoprazole; will continue for now, but discontinue if not part of home regimen.    General  - DVT prophylaxis: heparin drip  - dc and start HSQ  - Diet: DASH   - Medication reconciliation: incomplete, son to bring in home medications on 6/15/25 in evening   - Code: Full   - Medications: Tylenol 650 mg q6h as needed for pain, Maalox 30 mL q4h as needed for acid reflux, melatonin 3 mg bedtime as needed for insomnia, Zofran 4 mg IV q8h as needed for nausea/vomiting     Disposition  - Needed before discharge: off oxygen for 24 hours, cardiac and pulmonary work up, PT evaluation   - Anticipated discharge date: 6/17/25   - Discharge to: pending PT   - Appointments after discharge: PCP, heart failure     Plan discussed with patient in detail. Patient agrees with plan. All questions answered. Patient to update family; offered to do this myself, but the patient does not want me to disturb her son at this time.

## 2025-06-16 NOTE — PHYSICAL THERAPY INITIAL EVALUATION ADULT - PERTINENT HX OF CURRENT PROBLEM, REHAB EVAL
69F with PMHx DM, HTN, VERONIKA, sciatica, peripheral neuropathy, seizures initially presenting to [facility] on 6/10/2025 complaining of progressively worsening shortness of breath x1 month, admitted for acute decompensated heart failure exacerbation and possible pneumonia. She was receiving lasix IV for diuresis & Rocephin for PNA. Transferred to CCU at outside hospital, and subsequently transferred to [facility] CICU. Upon arrival to [facility] CICU, patient on bipap, appears comfortable, hemodynamically stable. She has since been downgraded to telemetry & is on 2L NC. XRay Chest 6/14: Bilateral perihilar haziness and bilateral layering pleural effusions compatible with pulmonary edema.

## 2025-06-16 NOTE — PROGRESS NOTE ADULT - PROBLEM SELECTOR PLAN 2
On 6/14/25, TTE with EF 30-35% with wall motion abnormalities. Diuresed with Bumex drip earlier in hospitalization.   - Treatment with diuresis per heart failure, follow up recommendations  - Pulmonology consult on 6/15/25 (called) for possible thoracentesis, follow up recommendations   - Isordil 10 mg TID   - Hydralazine 50 mg TID   - Heparin drip for now due to possible LV thrombus on echo on 6/14/25; obtaining new partial echo; if new echo is negative for LV thrombus, can stop heparin drip On 6/14/25, TTE with EF 30-35% with wall motion abnormalities. Diuresed with Bumex drip earlier in hospitalization.   - Treatment with diuresis per heart failure, follow up recommendations  - Pulmonology consult on 6/15/25 (called) for possible thoracentesis, follow up recommendations   - Isordil 10 mg TID   - Hydralazine 50 mg TID   - Heparin drip for now due to possible LV thrombus on echo on 6/14/25; obtaining new partial echo; if new echo is negative for LV thrombus, can stop heparin drip - no LV thrombus on repeat TTE, dc heparin gtt

## 2025-06-16 NOTE — ADVANCED PRACTICE NURSE CONSULT - ASSESSMENT
the pt was encountered on [facility]. Mrs Waters was awake and alert, engaging in conversation. she is in a low air-loss surface and is being assisted with T&P as per review of the nursing documentation. As she was a/w skin changes she was seen by nutrition and a consult is pending.  staff have applied a pure-wick catheter to divert urine from the skin.  upon assessment, the pt presents with intact skin to the sacrum and b/l buttocks. in the fold of the buttocks the skin is intact, hyperpigmented. this may be the pts normal skin tone but cannot r/o a component of deep tissue injury, poa.  the skin on the b/l heels is intact dry, pt with pain in her heels and legs- primary RN aware.  education was provided re: skin condition, tx plan and PI prevention. the pt was left in a left side-lying position

## 2025-06-16 NOTE — PROGRESS NOTE ADULT - ASSESSMENT
Ms Waters is a 69 yr old F, with PMH of HTN, DM2, VERONIKA, sciatica, peripheral neuropathy, hx of seizure initially presented to [facility] on 6/10/25 with progressively worsening SOB, admitted for ADHF and possible pneumonia. Received IV diuretics (lasix) and antibiotics for pneumonia and was transferred to CCU. Ultimately transferred to [facility] CICU for further management. Diuresed, found with LVEF 30-35% has LBBB    now off IV bumex drip but is SOB on minimal exertion CHF meds being optimized    advise    1. followup  CXR possible thoracentesis with effusions  2/ CHF meds as per HF team  3/ once stable eventual Right nad left heart caht  4/ ? CRT ICD with LBBB   5. please have CHF service followup

## 2025-06-16 NOTE — DIETITIAN INITIAL EVALUATION ADULT - NSFNSGIASSESSMENTFT_GEN_A_CORE
Last BM 6/11 per nursing flowsheets. Patient reports last BM prior to admission. Bowel regimen ordered (Miralax, Senna).

## 2025-06-16 NOTE — ADVANCED PRACTICE NURSE CONSULT - RECOMMEDATIONS
impression:  b/l buttocks: hyperpigmentation v DTI  1. sacrum, b/l buttocks; continue to monitor, routine pericare with monty  2. continue with T&P  3. b/l heels: moisturize daily with Sween, off-load with boots or cushion  4. seat cushion when oob to chair  5. nutrition support as pt condition allows  tx plan discussed with pt/RN

## 2025-06-16 NOTE — PROGRESS NOTE ADULT - PROBLEM SELECTOR PLAN 1
Patient presents with new onset oxygen needs. Likely due to acute decompensated heart failure.   - Treat heart failure below   - Wean oxygen as tolerated, goal SpO2>92%  - Stop DuoNeb as no clear indication Patient presents with new onset oxygen needs. Likely due to acute decompensated heart failure.   - Treat heart failure below   - Wean oxygen as tolerated, goal SpO2>92%  - Stop DuoNeb as no clear indication  F/u CXR performed this morning

## 2025-06-16 NOTE — DIETITIAN INITIAL EVALUATION ADULT - PERTINENT MEDS FT
MEDICATIONS  (STANDING):  aspirin  chewable 81 milliGRAM(s) Oral daily  atorvastatin 80 milliGRAM(s) Oral at bedtime  bumetanide Injectable 2 milliGRAM(s) IV Push daily  divalproex  milliGRAM(s) Oral two times a day  gabapentin 300 milliGRAM(s) Oral three times a day  heparin  Infusion 1100 Unit(s)/Hr (19 mL/Hr) IV Continuous <Continuous>  hydrALAZINE 50 milliGRAM(s) Oral three times a day  insulin lispro (ADMELOG) corrective regimen sliding scale   SubCutaneous at bedtime  insulin lispro (ADMELOG) corrective regimen sliding scale   SubCutaneous three times a day before meals  pantoprazole    Tablet 40 milliGRAM(s) Oral before breakfast  polyethylene glycol 3350 17 Gram(s) Oral at bedtime  sacubitril 24 mG/valsartan 26 mG 1 Tablet(s) Oral two times a day  senna 2 Tablet(s) Oral at bedtime    MEDICATIONS  (PRN):  acetaminophen     Tablet .. 650 milliGRAM(s) Oral every 6 hours PRN Temp greater or equal to 38C (100.4F), Mild Pain (1 - 3)  aluminum hydroxide/magnesium hydroxide/simethicone Suspension 30 milliLiter(s) Oral every 4 hours PRN Dyspepsia  melatonin 3 milliGRAM(s) Oral at bedtime PRN Insomnia  ondansetron Injectable 4 milliGRAM(s) IV Push every 8 hours PRN Nausea and/or Vomiting

## 2025-06-16 NOTE — DIETITIAN INITIAL EVALUATION ADULT - ORAL INTAKE PTA/DIET HISTORY
Patient visited. Per patient, denies adhering to a therapeutic diet and reports poor appetite x one month prior to admission. Patient reports having one meal daily. Per chart, shellfish allergy noted (reaction: swelling per patient). Patient confirmed allergy, and reports to be lactose intolerant. Food and nutrition services to be made aware. Patient reports supplementing with Centrum prior to admission.

## 2025-06-17 LAB
% ALBUMIN: 40.3 % — SIGNIFICANT CHANGE UP
% ALBUMIN: 40.8 % — SIGNIFICANT CHANGE UP
% ALPHA 1: 7.1 % — SIGNIFICANT CHANGE UP
% ALPHA 1: 7.1 % — SIGNIFICANT CHANGE UP
% ALPHA 2: 12.5 % — SIGNIFICANT CHANGE UP
% ALPHA 2: 12.5 % — SIGNIFICANT CHANGE UP
% BETA: 14.7 % — SIGNIFICANT CHANGE UP
% BETA: 14.8 % — SIGNIFICANT CHANGE UP
% GAMMA: 24.8 % — SIGNIFICANT CHANGE UP
% GAMMA: 25.4 % — SIGNIFICANT CHANGE UP
ALBUMIN SERPL ELPH-MCNC: 2.6 G/DL — LOW (ref 3.6–5.5)
ALBUMIN SERPL ELPH-MCNC: 2.7 G/DL — LOW (ref 3.6–5.5)
ALBUMIN SERPL ELPH-MCNC: 3 G/DL — LOW (ref 3.3–5)
ALBUMIN/GLOB SERPL ELPH: 0.7 RATIO — SIGNIFICANT CHANGE UP
ALBUMIN/GLOB SERPL ELPH: 0.7 RATIO — SIGNIFICANT CHANGE UP
ALP SERPL-CCNC: 49 U/L — SIGNIFICANT CHANGE UP (ref 40–120)
ALPHA1 GLOB SERPL ELPH-MCNC: 0.5 G/DL — HIGH (ref 0.1–0.4)
ALPHA1 GLOB SERPL ELPH-MCNC: 0.5 G/DL — HIGH (ref 0.1–0.4)
ALPHA2 GLOB SERPL ELPH-MCNC: 0.8 G/DL — SIGNIFICANT CHANGE UP (ref 0.5–1)
ALPHA2 GLOB SERPL ELPH-MCNC: 0.8 G/DL — SIGNIFICANT CHANGE UP (ref 0.5–1)
ALT FLD-CCNC: 9 U/L — LOW (ref 10–45)
ANION GAP SERPL CALC-SCNC: 13 MMOL/L — SIGNIFICANT CHANGE UP (ref 5–17)
ANION GAP SERPL CALC-SCNC: 14 MMOL/L — SIGNIFICANT CHANGE UP (ref 5–17)
APTT BLD: 102.6 SEC — HIGH (ref 26.1–36.8)
APTT BLD: 71.2 SEC — HIGH (ref 26.1–36.8)
AST SERPL-CCNC: 11 U/L — SIGNIFICANT CHANGE UP (ref 10–40)
B-GLOBULIN SERPL ELPH-MCNC: 1 G/DL — SIGNIFICANT CHANGE UP (ref 0.5–1)
B-GLOBULIN SERPL ELPH-MCNC: 1 G/DL — SIGNIFICANT CHANGE UP (ref 0.5–1)
BILIRUB SERPL-MCNC: 0.2 MG/DL — SIGNIFICANT CHANGE UP (ref 0.2–1.2)
BLD GP AB SCN SERPL QL: NEGATIVE — SIGNIFICANT CHANGE UP
BUN SERPL-MCNC: 56 MG/DL — HIGH (ref 7–23)
BUN SERPL-MCNC: 59 MG/DL — HIGH (ref 7–23)
CALCIUM SERPL-MCNC: 10.1 MG/DL — SIGNIFICANT CHANGE UP (ref 8.4–10.5)
CALCIUM SERPL-MCNC: 9.6 MG/DL — SIGNIFICANT CHANGE UP (ref 8.4–10.5)
CHLORIDE SERPL-SCNC: 96 MMOL/L — SIGNIFICANT CHANGE UP (ref 96–108)
CHLORIDE SERPL-SCNC: 98 MMOL/L — SIGNIFICANT CHANGE UP (ref 96–108)
CO2 SERPL-SCNC: 26 MMOL/L — SIGNIFICANT CHANGE UP (ref 22–31)
CO2 SERPL-SCNC: 28 MMOL/L — SIGNIFICANT CHANGE UP (ref 22–31)
CREAT SERPL-MCNC: 1.63 MG/DL — HIGH (ref 0.5–1.3)
CREAT SERPL-MCNC: 1.78 MG/DL — HIGH (ref 0.5–1.3)
CREATININE, URINE RESULT: 56 MG/DL — SIGNIFICANT CHANGE UP
EGFR: 30 ML/MIN/1.73M2 — LOW
EGFR: 30 ML/MIN/1.73M2 — LOW
EGFR: 34 ML/MIN/1.73M2 — LOW
EGFR: 34 ML/MIN/1.73M2 — LOW
GAMMA GLOBULIN: 1.6 G/DL — SIGNIFICANT CHANGE UP (ref 0.6–1.6)
GAMMA GLOBULIN: 1.7 G/DL — HIGH (ref 0.6–1.6)
GAS PNL BLDA: SIGNIFICANT CHANGE UP
GLUCOSE BLDC GLUCOMTR-MCNC: 158 MG/DL — HIGH (ref 70–99)
GLUCOSE BLDC GLUCOMTR-MCNC: 162 MG/DL — HIGH (ref 70–99)
GLUCOSE BLDC GLUCOMTR-MCNC: 163 MG/DL — HIGH (ref 70–99)
GLUCOSE BLDC GLUCOMTR-MCNC: 183 MG/DL — HIGH (ref 70–99)
GLUCOSE BLDC GLUCOMTR-MCNC: 193 MG/DL — HIGH (ref 70–99)
GLUCOSE SERPL-MCNC: 112 MG/DL — HIGH (ref 70–99)
GLUCOSE SERPL-MCNC: 143 MG/DL — HIGH (ref 70–99)
HCT VFR BLD CALC: 30.6 % — LOW (ref 34.5–45)
HGB BLD-MCNC: 9.1 G/DL — LOW (ref 11.5–15.5)
INR BLD: 1.07 RATIO — SIGNIFICANT CHANGE UP (ref 0.85–1.16)
INTERPRETATION SERPL IFE-IMP: SIGNIFICANT CHANGE UP
MAGNESIUM SERPL-MCNC: 2 MG/DL — SIGNIFICANT CHANGE UP (ref 1.6–2.6)
MAGNESIUM SERPL-MCNC: 2.1 MG/DL — SIGNIFICANT CHANGE UP (ref 1.6–2.6)
MCHC RBC-ENTMCNC: 27.2 PG — SIGNIFICANT CHANGE UP (ref 27–34)
MCHC RBC-ENTMCNC: 29.7 G/DL — LOW (ref 32–36)
MCV RBC AUTO: 91.6 FL — SIGNIFICANT CHANGE UP (ref 80–100)
NRBC # BLD AUTO: 0.03 K/UL — HIGH (ref 0–0)
NRBC # FLD: 0.03 K/UL — HIGH (ref 0–0)
NRBC BLD AUTO-RTO: 0 /100 WBCS — SIGNIFICANT CHANGE UP (ref 0–0)
PHOSPHATE SERPL-MCNC: 3.6 MG/DL — SIGNIFICANT CHANGE UP (ref 2.5–4.5)
PLATELET # BLD AUTO: 308 K/UL — SIGNIFICANT CHANGE UP (ref 150–400)
PMV BLD: 12 FL — SIGNIFICANT CHANGE UP (ref 7–13)
POTASSIUM SERPL-MCNC: 4.2 MMOL/L — SIGNIFICANT CHANGE UP (ref 3.5–5.3)
POTASSIUM SERPL-MCNC: 4.8 MMOL/L — SIGNIFICANT CHANGE UP (ref 3.5–5.3)
POTASSIUM SERPL-SCNC: 4.2 MMOL/L — SIGNIFICANT CHANGE UP (ref 3.5–5.3)
POTASSIUM SERPL-SCNC: 4.8 MMOL/L — SIGNIFICANT CHANGE UP (ref 3.5–5.3)
PROT ?TM UR-MCNC: 50 MG/DL — HIGH (ref 0–12)
PROT PATTERN SERPL ELPH-IMP: SIGNIFICANT CHANGE UP
PROT PATTERN SERPL ELPH-IMP: SIGNIFICANT CHANGE UP
PROT SERPL-MCNC: 6.5 G/DL — SIGNIFICANT CHANGE UP (ref 6–8.3)
PROT SERPL-MCNC: 6.5 G/DL — SIGNIFICANT CHANGE UP (ref 6–8.3)
PROT SERPL-MCNC: 6.9 G/DL — SIGNIFICANT CHANGE UP (ref 6–8.3)
PROTHROM AB SERPL-ACNC: 12.3 SEC — SIGNIFICANT CHANGE UP (ref 9.9–13.4)
RBC # BLD: 3.34 M/UL — LOW (ref 3.8–5.2)
RBC # FLD: 17.1 % — HIGH (ref 10.3–14.5)
RH IG SCN BLD-IMP: POSITIVE — SIGNIFICANT CHANGE UP
SODIUM SERPL-SCNC: 136 MMOL/L — SIGNIFICANT CHANGE UP (ref 135–145)
SODIUM SERPL-SCNC: 139 MMOL/L — SIGNIFICANT CHANGE UP (ref 135–145)
WBC # BLD: 6.41 K/UL — SIGNIFICANT CHANGE UP (ref 3.8–10.5)
WBC # FLD AUTO: 6.41 K/UL — SIGNIFICANT CHANGE UP (ref 3.8–10.5)

## 2025-06-17 PROCEDURE — 99291 CRITICAL CARE FIRST HOUR: CPT

## 2025-06-17 PROCEDURE — 99233 SBSQ HOSP IP/OBS HIGH 50: CPT | Mod: GC

## 2025-06-17 PROCEDURE — G0316 PROLONG INPT EVAL ADD15 M: CPT

## 2025-06-17 PROCEDURE — 93010 ELECTROCARDIOGRAM REPORT: CPT

## 2025-06-17 PROCEDURE — 95816 EEG AWAKE AND DROWSY: CPT | Mod: 26

## 2025-06-17 PROCEDURE — 70450 CT HEAD/BRAIN W/O DYE: CPT | Mod: 26

## 2025-06-17 RX ADMIN — INSULIN LISPRO 1: 100 INJECTION, SOLUTION INTRAVENOUS; SUBCUTANEOUS at 07:49

## 2025-06-17 RX ADMIN — Medication 81 MILLIGRAM(S): at 17:55

## 2025-06-17 RX ADMIN — Medication 40 MILLIGRAM(S): at 06:06

## 2025-06-17 RX ADMIN — BUMETANIDE 2 MILLIGRAM(S): 1 TABLET ORAL at 06:06

## 2025-06-17 RX ADMIN — SACUBITRIL AND VALSARTAN 1 TABLET(S): 6; 6 PELLET ORAL at 17:55

## 2025-06-17 RX ADMIN — Medication 500 MILLIGRAM(S): at 17:55

## 2025-06-17 RX ADMIN — POLYETHYLENE GLYCOL 3350 17 GRAM(S): 17 POWDER, FOR SOLUTION ORAL at 21:51

## 2025-06-17 RX ADMIN — INSULIN LISPRO 1: 100 INJECTION, SOLUTION INTRAVENOUS; SUBCUTANEOUS at 11:34

## 2025-06-17 RX ADMIN — INSULIN LISPRO 1: 100 INJECTION, SOLUTION INTRAVENOUS; SUBCUTANEOUS at 17:56

## 2025-06-17 RX ADMIN — Medication 500 MILLIGRAM(S): at 06:06

## 2025-06-17 RX ADMIN — GABAPENTIN 300 MILLIGRAM(S): 400 CAPSULE ORAL at 06:08

## 2025-06-17 RX ADMIN — Medication 2 TABLET(S): at 21:50

## 2025-06-17 RX ADMIN — SACUBITRIL AND VALSARTAN 1 TABLET(S): 6; 6 PELLET ORAL at 06:06

## 2025-06-17 RX ADMIN — GABAPENTIN 300 MILLIGRAM(S): 400 CAPSULE ORAL at 21:50

## 2025-06-17 RX ADMIN — ATORVASTATIN CALCIUM 80 MILLIGRAM(S): 80 TABLET, FILM COATED ORAL at 21:50

## 2025-06-17 NOTE — PROGRESS NOTE ADULT - PROBLEM SELECTOR PLAN 1
Patient presents with new onset oxygen needs. Likely due to acute decompensated heart failure.   - Treat heart failure below   - Wean oxygen as tolerated, goal SpO2>92%  - Stop DuoNeb as no clear indication  Acute Hypoxic Hypercarbic Respiratory Failure  RRT this morning -CT head negative  blood gas shows PCO2 of 57  wore Bipap for additional 90 minutes today and mental status improved to baseline

## 2025-06-17 NOTE — PROGRESS NOTE ADULT - ASSESSMENT
Ms Waters is a 69 yr old F, with PMH of HTN, DM2, VERONIKA, sciatica, peripheral neuropathy, hx of seizure initially presented to [facility] on 6/10/25 with progressively worsening SOB, admitted for ADHF and possible pneumonia. Received IV diuretics (lasix) and antibiotics for pneumonia and was transferred to CCU. Ultimately transferred to [facility] CICU for further management. Diuresed, found with LVEF 30-35% has LBBB    now off IV bumex drip but is SOB on minimal exertion CHF meds being optimized, still elevate creatineine    episode decreased resposiveness > related to CO2 retention hypoflycemia or hypotension, no evidence of stroke    advise    1.continue CHF meds as per CHF service  2. consider pulmonary evaluation  3. MRI results pending  4. eventually needs L and R HC , and possible CRT ICD ( LBBB)

## 2025-06-17 NOTE — PROGRESS NOTE ADULT - SUBJECTIVE AND OBJECTIVE BOX
INTERVAL HPI/OVERNIGHT EVENTS: remains lethargic, creatinine mildy increased, episode of decresed responsiveness noted    MEDICATIONS  (STANDING):  aspirin  chewable 81 milliGRAM(s) Oral daily  atorvastatin 80 milliGRAM(s) Oral at bedtime  bumetanide Injectable 2 milliGRAM(s) IV Push daily  divalproex  milliGRAM(s) Oral two times a day  gabapentin 300 milliGRAM(s) Oral three times a day  hydrALAZINE 50 milliGRAM(s) Oral three times a day  insulin lispro (ADMELOG) corrective regimen sliding scale   SubCutaneous three times a day before meals  insulin lispro (ADMELOG) corrective regimen sliding scale   SubCutaneous at bedtime  pantoprazole    Tablet 40 milliGRAM(s) Oral before breakfast  polyethylene glycol 3350 17 Gram(s) Oral at bedtime  sacubitril 24 mG/valsartan 26 mG 1 Tablet(s) Oral two times a day  senna 2 Tablet(s) Oral at bedtime    MEDICATIONS  (PRN):  acetaminophen     Tablet .. 650 milliGRAM(s) Oral every 6 hours PRN Temp greater or equal to 38C (100.4F), Mild Pain (1 - 3)  aluminum hydroxide/magnesium hydroxide/simethicone Suspension 30 milliLiter(s) Oral every 4 hours PRN Dyspepsia  melatonin 3 milliGRAM(s) Oral at bedtime PRN Insomnia  ondansetron Injectable 4 milliGRAM(s) IV Push every 8 hours PRN Nausea and/or Vomiting      Allergies    shellfish (Other; Short breath)  No Known Drug Allergies    Intolerances    lactose (Unknown)    ROS:  General: Pt denies recent weight loss/fever/chills    Neurological: denies numbness or  sensation loss    Cardiovascular: denies chest pain/palpitations/leg edema    Respiratory and Thorax: denies SOB/cough/wheezing    Gastrointestinal: denies abdominal pain/diarrhea/constipation/bloody stool    Genitourinary: denies urinary frequency/urgency/ dysuria    Musculoskeletal: denies joint pain or swelling, denies restricted motion    Hematologic: denies abnormal bleeding  	    	  	    		        	    	            Vital Signs Last 24 Hrs  T(C): 37 (17 Jun 2025 20:00), Max: 37 (17 Jun 2025 20:00)  T(F): 98.6 (17 Jun 2025 20:00), Max: 98.6 (17 Jun 2025 20:00)  HR: 92 (17 Jun 2025 20:00) (45 - 101)  BP: 93/60 (17 Jun 2025 20:00) (92/62 - 104/67)  BP(mean): --  RR: 18 (17 Jun 2025 20:00) (18 - 18)  SpO2: 100% (17 Jun 2025 20:00) (95% - 100%)    Parameters below as of 17 Jun 2025 20:00  Patient On (Oxygen Delivery Method): nasal cannula  O2 Flow (L/min): 3    Daily     Daily     06-16 @ 07:01  -  06-17 @ 07:00  --------------------------------------------------------  IN: 400 mL / OUT: 0 mL / NET: 400 mL    06-17 @ 07:01  -  06-17 @ 21:11  --------------------------------------------------------  IN: 250 mL / OUT: 0 mL / NET: 250 mL      Physical Exam:    wdwn female  noJVD  cor RRR  lung clear  abd sioft  ext no edema      LABS:                        9.1    6.41  )-----------( 308      ( 17 Jun 2025 12:15 )             30.6     06-17    136  |  96  |  59[H]  ----------------------------<  143[H]  4.2   |  26  |  1.78[H]    Ca    9.6      17 Jun 2025 12:15  Phos  3.6     06-17  Mg     2.0     06-17    TPro  6.9  /  Alb  3.0[L]  /  TBili  0.2  /  DBili  x   /  AST  11  /  ALT  9[L]  /  AlkPhos  49  06-17    PT/INR - ( 17 Jun 2025 12:15 )   PT: 12.3 sec;   INR: 1.07 ratio         PTT - ( 17 Jun 2025 12:15 )  PTT:102.6 sec  Urinalysis Basic - ( 17 Jun 2025 12:15 )    Color: x / Appearance: x / SG: x / pH: x  Gluc: 143 mg/dL / Ketone: x  / Bili: x / Urobili: x   Blood: x / Protein: x / Nitrite: x   Leuk Esterase: x / RBC: x / WBC x   Sq Epi: x / Non Sq Epi: x / Bacteria: x        RADIOLOGY & ADDITIONAL TESTS:    TELE:    EKG:

## 2025-06-17 NOTE — RAPID RESPONSE TEAM SUMMARY - NSSITUATIONBACKGROUNDRRT_GEN_ALL_CORE
69F with PMH of HTN, DM2, VERONIKA, sciatica, peripheral neuropathy, and history of seizures initially presented to [facility] on 6/10/25 for progressively worsening shortness of breath. She was admitted for acute decompensated heart failure (ADHF) and possible pneumonia. She was treated with IV Lasix and antibiotics, then transferred to CCU. For further management, she was transferred to [facility] CICU where she was placed on BiPAP and started on IV diuresis for respiratory distress and significant pulmonary edema. Since then, she has improved, downgraded to telemetry, and is currently stable on 2L nasal cannula.     A rapid response was called at 12:04 for acute changes in mental status. Per nursing, her last known normal was approximately 2 hours prior. On assessment, she was lethargic, not alert or oriented, with a subjective right-sided facial droop. She was previously on a heparin drip for concern of LV thrombus, but this was discontinued after no thrombus was found. She has a history of seizures (on Depakote) and is also on gabapentin, both of which may contribute to sedation. She uses nocturnal BiPAP, but it was unclear if she completed the full session the night prior.     At bedside, her vitals remained stable. Labs were notable for a mild rise in creatinine. Initially lethargic, she became more responsive after an ABG was obtained, yelling and moving all extremities. Labs and CT head were obtained.     Overall, her symptoms are likely multifactorial, potentially related to acute metabolic encephalopathy from hypercarbia, medication effects, delirium, or possible hemorrhagic stroke (to rule out).  69F with PMH of HTN, DM2, VERONIKA, sciatica, peripheral neuropathy, and history of seizures initially presented to [facility] on 6/10/25 for progressively worsening shortness of breath. She was admitted for acute decompensated heart failure (ADHF) and possible pneumonia. She was treated with IV Lasix and antibiotics, then transferred to CCU. For further management, she was transferred to [facility] CICU where she was placed on BiPAP and started on IV diuresis for respiratory distress and significant pulmonary edema. Since then, she has improved, downgraded to telemetry, and is currently stable on 2L nasal cannula.     A rapid response was called at 12:04 for acute changes in mental status. Per nursing, her last known normal was approximately 2 hours prior. On assessment, she was lethargic, not alert or oriented, with a subjective right-sided facial droop. She was previously on a heparin drip for concern of LV thrombus, but this was discontinued after no thrombus was found. She has a history of seizures (on Depakote) and is also on gabapentin, both of which may contribute to sedation. She uses nocturnal BiPAP, but it was unclear if she completed the full session the night prior.     At bedside, her vitals remained stable. Labs were notable for a mild rise in creatinine. Initially lethargic, she became more responsive after an ABG was obtained, yelling and moving all extremities. Labs and CT head were obtained.     Overall, her symptoms are likely multifactorial, potentially related to acute metabolic encephalopathy from hypercarbia, medication effects, delirium, or possible hemorrhagic stroke (to rule out). Can also consider post ictal as well.

## 2025-06-17 NOTE — RAPID RESPONSE TEAM SUMMARY - NSOTHERINTERVENTIONSRRT_GEN_ALL_CORE
Follow up CT head to rule out acute hemorrhage  Follow up CT head to rule out acute hemorrhage. Can consider EEG as well.

## 2025-06-17 NOTE — PROGRESS NOTE ADULT - PROBLEM SELECTOR PLAN 8
Unclear indication for pantoprazole; will continue for now, but discontinue if not part of home regimen.    General  - DVT prophylaxis: heparin drip  - dc and start HSQ  - Diet: DASH   - Medication reconciliation: incomplete, son to bring in home medications on 6/15/25 in evening   - Code: Full   - Medications: Tylenol 650 mg q6h as needed for pain, Maalox 30 mL q4h as needed for acid reflux, melatonin 3 mg bedtime as needed for insomnia, Zofran 4 mg IV q8h as needed for nausea/vomiting     Disposition  - Needed before discharge: off oxygen for 24 hours, cardiac and pulmonary work up, PT evaluation   - Anticipated discharge date: 6/17/25   - Discharge to: pending PT   - Appointments after discharge: PCP, heart failure     Plan discussed with patient in detail. Patient agrees with plan. All questions answered. Patient to update family; offered to do this myself, but the patient does not want me to disturb her son at this time.

## 2025-06-17 NOTE — PROGRESS NOTE ADULT - PROBLEM SELECTOR PLAN 1
- Etiology: unclear. Pt states she has NO prior h/o cardiomyopathy.  - TTE outside hospital show LVEF 20% but TTE 6/14 here with LVEF 30-35%.  - Please dc heparin gtt, no e/o LV thrombus on repeat TTE     - Will obtain records from [facility]     - Will need eventual LHC/RHC when more euvolemic & stable renal function. Can try for tomm if Cr improving     - Amyloid w/u initiated (free light chains & immunofixation)     - Would order cardiac MRI to eval for infiltraive processes  - GDMT:     - ARNI: Cont Entresto 24-26 mg BID.  - Afterload reduction: c/w HDZN 50 mg q8 hr  - Diuretics: c/w Bumex 2 mg IVP QD.     - Strict I/Os and daily weights     - Appreciate pulm recs to determine if pleural effusions amenable for drainage  - K > 4 & Mg > 2 - Etiology: unclear. Pt states she has NO prior h/o cardiomyopathy. She has +WMA on TTE, abnormal EKG and risk fcators for CAD. She also had a recent CT chest with mediastinal lymphadenopathy concerning for sarcoid.   - TTE outside hospital show LVEF 20% but TTE 6/14 here with LVEF 30-35%.  - Please dc heparin gtt, no e/o LV thrombus on repeat TTE     - Will obtain records from [facility]     - Will need eventual LHC/RHC when more euvolemic & stable renal function. Can try for tomm if Cr improving     - Amyloid w/u initiated (free light chains & immunofixation)     - Would order cardiac MRI to eval for infiltraive processes  - GDMT:     - ARNI: Cont Entresto 24-26 mg BID.  - Afterload reduction: c/w HDZN 50 mg q8 hr  - Diuretics: c/w Bumex 2 mg IVP QD.     - Strict I/Os and daily weights     - Appreciate pulm recs to determine if pleural effusions amenable for drainage  - K > 4 & Mg > 2

## 2025-06-17 NOTE — PROGRESS NOTE ADULT - PROBLEM SELECTOR PLAN 2
- Acute hypoxia likely i/s/o of pulmonary edema  - Was on bipap - now on 2LNC  - C/w DuoNebs & diuresis as above  - Appreciate pulm recs as above - Acute hypoxia likely i/s/o of pulmonary edema  - Was on bipap - now on 2LNC  - C/w DuoNebs & diuresis as above  - Appreciate pulm recs as above  - Pulmonary to comment on recent chest CT results

## 2025-06-17 NOTE — CONSULT NOTE ADULT - ASSESSMENT
ASSESSMENT       IMPRESSION   Overall (improved, worsened, stable) neurologically.     RECOMMENDATION         Patient to be seen by team and attending. Note finalized upon attending attestation.  ASSESSMENT   Pt is a 69y Woman with PMH of HTN. DM, VERONIKA on BiPAP, neuropathy, hx of seizures on depakote, admitted for acute exacerbation of HF and p/w an episode of lethargy and AMS which has now gradually resolved after a few minutes.     IMPRESSION   Overall pt neurologically improved since episode this morning, back to last night's baseline per son at bedside. Pt had brief episode of AMS and lethargy possibly d/t hypercapnia with ABG showing pCO2 of 57 despite BiPAP tx last night. Can't exclude MIKAELA with increasing BUN/creatinine. Sx less consistent with seizure activity or new stroke given CTH findings with no acute changes, and no new focal deficits on motor exam.     RECOMMENDATION   - No further inpatient neurologic workup at this time  - c/w home ASMs gabapentin 300mg TID, Depakote 500 TID  - f/u with outpatient neurologist for possible MRI Head        Patient to be seen by team and attending. Note finalized upon attending attestation.  ASSESSMENT   Pt is a 69y Woman with PMH of HTN. DM, VERONIKA on BiPAP, neuropathy, hx of seizures on depakote, admitted for acute exacerbation of HF and p/w an episode of lethargy and AMS which has now gradually resolved after a few minutes.     IMPRESSION   Overall pt neurologically improved since episode this morning, back to last night's baseline per son at bedside. Pt's brief episode of AMS and lethargy possibly d/t hypercapnia with ABG showing pCO2 of 57 despite BiPAP tx last night. Can't exclude MIKAELA with increasing BUN/creatinine causing electrolyte imbalance. Low suspicion for seizure activity or new CVA given CTH findings with no acute changes, no focal deficits on exam.    RECOMMENDATION   - No further inpatient neurologic workup at this time  - c/w home ASMs gabapentin 300mg TID, Depakote 500 BID  - f/u with outpatient neurologist for possible MRI Head        Patient to be seen by team and attending. Note finalized upon attending attestation.  ASSESSMENT   Pt is a 69y Woman with PMH of HTN. DM, VERONIKA on BiPAP, neuropathy, hx of seizures on depakote, admitted for acute exacerbation of HF and p/w an episode of lethargy and decreased responsiveness which has now gradually resolved after a 30-40 minutes after BIPAP use. NIHSS-4 ( non focal)    IMPRESSION   Brief episode of AMS and decreased responsiveness possibly d/t hypercapnia with ABG showing pCO2 of 57 despite BiPAP tx last night- overall pt neurologically improved since episode this morning, back to last night's baseline per son at bedside. Can't exclude metabolic encephalopathy in the setting of increasing BUN/creatinine causing electrolyte imbalance. Low suspicion for seizure activity ( stable on current ASMs for many years) or new stroke given CTH findings with no acute changes, no focal deficits on exam and short duration of symptoms.     RECOMMENDATION   -MRI brain w/o stroke can be considered inpatient vs outpatient   -If repeat episodes of unexplained decreased responsiveness can consider 24 cvEEG  -C/w home ASMs gabapentin 300mg TID, Depakote 500 BID  -Continue baby aspirin and atorvastatin 80mg  -Plv4y-0.2, lipid profile- LDL-82  -VTE prophylaxis- chemical if no C/I, IPC   -PT/OT evaluation     Patient to be seen by team and attending. Note finalized upon attending attestation.  ASSESSMENT   Pt is a 69y Woman with PMH of HTN. DM, VERONIKA on BiPAP, neuropathy, hx of seizures on depakote, admitted for acute exacerbation of HF and p/w an episode of lethargy and decreased responsiveness which has now gradually resolved after a 30-40 minutes after BIPAP use. NIHSS-4 ( non focal)    IMPRESSION   Brief episode of AMS and decreased responsiveness possibly d/t hypercapnia with ABG showing pCO2 of 57 despite BiPAP tx last night- overall pt neurologically improved since episode this morning, back to last night's baseline per son at bedside. Can't exclude metabolic encephalopathy in the setting of increasing BUN/creatinine causing electrolyte imbalance. Low suspicion for seizure activity ( stable on current ASMs for many years) or new stroke given CTH findings with no acute changes, no focal deficits on exam and short duration of symptoms.     RECOMMENDATION   -MRI brain w/o contrast can be considered inpatient vs outpatient   -If repeat episodes of unexplained decreased responsiveness can consider 24 cvEEG  -C/w home ASMs gabapentin 300mg TID, Depakote 500 BID  -Continue baby aspirin and atorvastatin 80mg  -Jqa8m-0.2, lipid profile- LDL-82  -VTE prophylaxis- chemical if no C/I, IPC   -PT/OT evaluation     Patient to be seen by team and attending. Note finalized upon attending attestation.  ASSESSMENT   Pt is a 69y Woman with PMH of HTN. DM, VERONIKA on BiPAP, neuropathy, hx of seizures on depakote, admitted for acute exacerbation of HF and p/w an episode of lethargy and decreased responsiveness which has now gradually resolved after a 30-40 minutes after BIPAP use. NIHSS-4 ( non focal)    IMPRESSION   Brief episode of AMS and decreased responsiveness possibly d/t hypercapnia with ABG showing pCO2 of 57 despite BiPAP tx last night- overall pt neurologically improved since episode this morning, back to last night's baseline per son at bedside. Can't exclude metabolic encephalopathy in the setting of increasing BUN/creatinine causing electrolyte imbalance. Low suspicion for seizure activity ( stable on current ASMs for many years) or new stroke given CTH findings with no acute changes, no focal deficits on exam and short duration of symptoms.     RECOMMENDATION   -MRI brain w/o contrast can be considered inpatient vs outpatient   -If repeat episodes of unexplained decreased responsiveness can consider 24 cvEEG  -C/w home ASMs gabapentin 300mg TID, Depakote 500 BID  -Continue baby aspirin and atorvastatin 80mg  -Valproic acid trough level ( prior to morning dose)  -Dab8w-3.2, lipid profile- LDL-82  -VTE prophylaxis- chemical if no C/I, IPC   -PT/OT evaluation     Patient to be seen by team and attending. Note finalized upon attending attestation.  ASSESSMENT   Pt is a 69y Woman with PMH of HTN. DM, VERONIKA on BiPAP, neuropathy, hx of seizures on depakote, admitted for acute exacerbation of HF and p/w an episode of lethargy and decreased responsiveness which has now gradually resolved after a 30-40 minutes after BIPAP use. NIHSS-4 ( non focal)    IMPRESSION   Brief episode of AMS and decreased responsiveness possibly d/t hypercapnia with ABG showing pCO2 of 57 despite BiPAP tx last night- overall pt neurologically improved since episode this morning, back to last night's baseline per son at bedside. Can't exclude metabolic encephalopathy in the setting of increasing BUN/creatinine causing electrolyte imbalance. Low suspicion for seizure activity ( stable on current ASMs for many years) or new stroke given CTH findings with no acute changes, no focal deficits on exam and short duration of symptoms.     RECOMMENDATION   -MRI brain w/o contrast can be considered inpatient if repeat episodes  -Routine EEG can be considered for further evaluation  -Valproic acid trough level ( prior to morning dose), Vitamin D25OH   -C/w home ASMs gabapentin 300mg TID, Depakote 500 BID  -Continue baby aspirin and atorvastatin 80mg  -Fqx8h-9.2, lipid profile- LDL-82  -VTE prophylaxis- chemical if no C/I, IPC   -PT/OT evaluation     Discussed with Dr. Akins, neurology attending

## 2025-06-17 NOTE — PROGRESS NOTE ADULT - SUBJECTIVE AND OBJECTIVE BOX
CARDIOLOGY CONSULT PROGRESS NOTE  SHREE WYATT  MRN-[id number]    INTERVAL EVENTS:  - No events ON. Cr rising today to 1.6  - Pt sleepy on eval but feels breathing is ok. Still with cough    ROS:  All other review of systems is negative unless indicated above    MEDICATIONS  (STANDING):  aspirin  chewable 81 milliGRAM(s) Oral daily  atorvastatin 80 milliGRAM(s) Oral at bedtime  bumetanide Injectable 2 milliGRAM(s) IV Push daily  divalproex  milliGRAM(s) Oral two times a day  gabapentin 300 milliGRAM(s) Oral three times a day  heparin  Infusion 1100 Unit(s)/Hr (20 mL/Hr) IV Continuous <Continuous>  hydrALAZINE 50 milliGRAM(s) Oral three times a day  insulin lispro (ADMELOG) corrective regimen sliding scale   SubCutaneous three times a day before meals  insulin lispro (ADMELOG) corrective regimen sliding scale   SubCutaneous at bedtime  pantoprazole    Tablet 40 milliGRAM(s) Oral before breakfast  polyethylene glycol 3350 17 Gram(s) Oral at bedtime  sacubitril 24 mG/valsartan 26 mG 1 Tablet(s) Oral two times a day  senna 2 Tablet(s) Oral at bedtime    MEDICATIONS  (PRN):  acetaminophen     Tablet .. 650 milliGRAM(s) Oral every 6 hours PRN Temp greater or equal to 38C (100.4F), Mild Pain (1 - 3)  aluminum hydroxide/magnesium hydroxide/simethicone Suspension 30 milliLiter(s) Oral every 4 hours PRN Dyspepsia  melatonin 3 milliGRAM(s) Oral at bedtime PRN Insomnia  ondansetron Injectable 4 milliGRAM(s) IV Push every 8 hours PRN Nausea and/or Vomiting    Allergies    shellfish (Other; Short breath)  No Known Drug Allergies    Intolerances    lactose (Unknown)    P/E:  Vital Signs Last 24 Hrs  T(C): 36.6 (17 Jun 2025 04:17), Max: 37.3 (16 Jun 2025 20:25)  T(F): 97.9 (17 Jun 2025 04:17), Max: 99.1 (16 Jun 2025 20:25)  HR: 96 (17 Jun 2025 09:33) (45 - 101)  BP: 96/58 (17 Jun 2025 06:45) (92/62 - 136/75)  BP(mean): --  RR: 18 (17 Jun 2025 04:17) (18 - 18)  SpO2: 95% (17 Jun 2025 09:33) (95% - 100%)    Parameters below as of 17 Jun 2025 04:17  Patient On (Oxygen Delivery Method): nasal cannula  O2 Flow (L/min): 2    Daily     Daily   I&O's Detail    16 Jun 2025 07:01  -  17 Jun 2025 07:00  --------------------------------------------------------  IN:    Oral Fluid: 400 mL  Total IN: 400 mL    OUT:  Total OUT: 0 mL    Total NET: 400 mL        I&O's Summary    16 Jun 2025 07:01  -  17 Jun 2025 07:00  --------------------------------------------------------  IN: 400 mL / OUT: 0 mL / NET: 400 mL      - gen: laying in hospital bed, NAD  - HEENT: MMM, NCAT  - neck: no JVD, supple  - heart: RRR, nml S1/S2, no RMG  - lungs: CTABL, nml WOB  - abd: soft, NTND, NABS  - ext: WWP, PPP, no ART    RELEVANT RECENT LABS/IMAGING/STUDIES:                        9.5    6.86  )-----------( 309      ( 16 Jun 2025 04:46 )             31.4     06-17    139  |  98  |  56[H]  ----------------------------<  112[H]  4.8   |  28  |  1.63[H]    Ca    10.1      17 Jun 2025 05:07  Mg     2.1     06-17    TPro  6.5  /  Alb  x   /  TBili  x   /  DBili  x   /  AST  x   /  ALT  x   /  AlkPhos  x   06-15    LIVER FUNCTIONS - ( 15 Ariel 2025 20:16 )  Alb: x     / Pro: 6.5 g/dL / ALK PHOS: x     / ALT: x     / AST: x     / GGT: x           PTT - ( 17 Jun 2025 05:07 )  PTT:71.2 sec  --------------------------------------        --------------------------------------

## 2025-06-17 NOTE — CONSULT NOTE ADULT - SUBJECTIVE AND OBJECTIVE BOX
Neurology - Consult Note    -  Spectra: [phone] ([facility]), [phone] ([facility])  -    HPI: Patient SHREE WYATT is a 69y (1955) wo/man with a PMHx significant for ***      Review of Systems:  INCOMPLETE   CONSTITUTIONAL: No fevers or chills  EYES AND ENT: No visual changes or no throat pain   NECK: No pain or stiffness  RESPIRATORY: No hemoptysis or shortness of breath  CARDIOVASCULAR: No chest pain or palpitations  GASTROINTESTINAL: No melena or hematochezia  GENITOURINARY: No dysuria or hematuria  NEUROLOGICAL: +As stated in HPI above  SKIN: No itching, burning, rashes, or lesions   All other review of systems is negative unless indicated above.    Allergies:  lactose (Unknown)  shellfish (Other; Short breath)  No Known Drug Allergies      PMHx/PSHx/Family Hx: As above, otherwise see below   HTN (hypertension)    Sleep apnea    Seizure disorder    Diabetes    Morbid obesity        Social Hx:  No current use of tobacco, alcohol, or illicit drugs  Lives with ***    Medications:  MEDICATIONS  (STANDING):  aspirin  chewable 81 milliGRAM(s) Oral daily  atorvastatin 80 milliGRAM(s) Oral at bedtime  bumetanide Injectable 2 milliGRAM(s) IV Push daily  divalproex  milliGRAM(s) Oral two times a day  gabapentin 300 milliGRAM(s) Oral three times a day  hydrALAZINE 50 milliGRAM(s) Oral three times a day  insulin lispro (ADMELOG) corrective regimen sliding scale   SubCutaneous three times a day before meals  insulin lispro (ADMELOG) corrective regimen sliding scale   SubCutaneous at bedtime  pantoprazole    Tablet 40 milliGRAM(s) Oral before breakfast  polyethylene glycol 3350 17 Gram(s) Oral at bedtime  sacubitril 24 mG/valsartan 26 mG 1 Tablet(s) Oral two times a day  senna 2 Tablet(s) Oral at bedtime    MEDICATIONS  (PRN):  acetaminophen     Tablet .. 650 milliGRAM(s) Oral every 6 hours PRN Temp greater or equal to 38C (100.4F), Mild Pain (1 - 3)  aluminum hydroxide/magnesium hydroxide/simethicone Suspension 30 milliLiter(s) Oral every 4 hours PRN Dyspepsia  melatonin 3 milliGRAM(s) Oral at bedtime PRN Insomnia  ondansetron Injectable 4 milliGRAM(s) IV Push every 8 hours PRN Nausea and/or Vomiting      Vitals:  T(C): 36.4 (06-17-25 @ 10:52), Max: 37.3 (06-16-25 @ 20:25)  HR: 96 (06-17-25 @ 12:33) (45 - 101)  BP: 104/67 (06-17-25 @ 10:52) (92/62 - 104/67)  RR: 18 (06-17-25 @ 10:52) (18 - 18)  SpO2: 100% (06-17-25 @ 12:33) (95% - 100%)    Physical Examination: INCOMPLETE  General - NAD, pleasant, cooperative   Cardiovascular - Peripheral pulses palpable, no edema  Neurologic Exam:  Mental status - Awake, Alert, Oriented to person, place, and time. Speech fluent, repetition and naming intact. Follows simple and complex commands. Attention/concentration, recent and remote memory (including registration and recall), and fund of knowledge intact    Cranial nerves:  CN II: Visual fields are full to confrontation.  Pupils are 4 mm and briskly reactive to light. Visual acuity is 20/20 bilaterally.  CN III, IV, VI: EOMI, no nystagmus, no ptosis  CN V: Facial sensation is intact to touch in all 3 divisions bilaterally.  CN VII: Face is symmetric with normal eye closure and smile.  CN VII: Hearing is normal to rubbing fingers  CN IX, X: Palate elevates symmetrically. Phonation is normal.  CN XI: Head turning and shoulder shrug are intact  CN XII: Tongue is midline with normal movements and no atrophy.    Motor - Normal bulk and tone throughout. No pronator drift of out-stretched arms.  Strength testing            Deltoid(C5)  Biceps(C6)    Triceps(C7)     Wrist Extension    Wrist Flexion (C8)     Interossei  (T1)       R            5                 5                        5                     5                              5                                      5                         5  L             5                 5                        5                     5                              5                                      5                          5              Hip Flexion(L2/3)    Hip Extension (L4/5)   Knee Flexion (L4/5/S1)    Knee Extension (L3/4)   Dorsiflexion (L4/5)   Plantar Flexion (S1)  R              5                                    5                                     5                                                     5                                              5                          5  L              5                                     5                                     5                                                     5                                              5                          5    Sensation - Light touch/temperature OR pain/vibration intact in fingers and toes     DTR's -             Biceps      Triceps     Brachioradialis      Patellar    Ankle    Toes/plantar response  R             2+             2+                  2+                       2+            2+                 Down  L              2+             2+                 2+                        2+           2+                 Down    Coordination - Rapid alternating movements and fine finger movements are intact. There is no dysmetria on finger-to-nose and heel-knee-shin. There are no abnormal or extraneous movements. Romberg?    Gait and station - Posture is normal. Gait is steady with normal steps, base, arm swing, and turning. Heel and toe walking are normal. Tandem gait is normal     [facility] SS:  DATE:  TIME:  1A: Level of consciousness (0-3): 0  1B: Questions (0-2): 0    1C: Commands (0-2): 0  2: Gaze (0-2): 0  3: Visual fields (0-3): 0  4: Facial palsy (0-3): 0  MOTOR:  5A: Left arm motor drift (0-4): 0  5B: Right arm motor drift (0-4): 0  6A: Left leg motor drift (0-4): 0  6B: Right leg motor drift (0-4): 0  7: Limb ataxia (0-2): 0  SENSORY:  8: Sensation (0-2): 0  SPEECH:  9: Language (0-3): 0  10: Dysarthria (0-2): 0  EXTINCTION:  11: Extinction/inattention (0-2): 0    TOTAL SCORE:       Labs:                        9.1    6.41  )-----------( 308      ( 17 Jun 2025 12:15 )             30.6     06-17    136  |  96  |  59[H]  ----------------------------<  143[H]  4.2   |  26  |  1.78[H]    Ca    9.6      17 Jun 2025 12:15  Phos  3.6     06-17  Mg     2.0     06-17    TPro  6.9  /  Alb  3.0[L]  /  TBili  0.2  /  DBili  x   /  AST  11  /  ALT  9[L]  /  AlkPhos  49  06-17    CAPILLARY BLOOD GLUCOSE      POCT Blood Glucose.: 163 mg/dL (17 Jun 2025 11:49)    LIVER FUNCTIONS - ( 17 Jun 2025 12:15 )  Alb: 3.0 g/dL / Pro: 6.9 g/dL / ALK PHOS: 49 U/L / ALT: 9 U/L / AST: 11 U/L / GGT: x             PT/INR - ( 17 Jun 2025 12:15 )   PT: 12.3 sec;   INR: 1.07 ratio         PTT - ( 17 Jun 2025 12:15 )  PTT:102.6 sec  CSF:                  Radiology:  CT Head No Cont:  (17 Jun 2025 12:27)       Neurology - Consult Note    -  Spectra: [phone] ([facility]), [phone] ([facility])  -    HPI: Patient SHREE WYATT is a 69y (1955) woman with a PMH significant for HTN, DM2, VERONIKA, peripheral neuropathy, seizures, admitted for acute decompensated HF. Neurology consulted for an episode of altered mental status where pt was minimally responsive. Pt reports no recollection of the event and only remembers seeing her friend early in the morning and the first thing she remembers after that is feeling generalized pain. Collateral hx obtained from son at bedside who reports walking in to the room and seeing pt sleeping off to one side and drooling with a subjective facial droop. Pt was then minimally responsive to stimulation, lethargic, and not alert or oriented so nurse was called in who then called RRT to assess. Pt became more responsive when ABG was taken with yelling moving all extremities. Pt is now sitting comfortably in bed and eating lunch. She reports no episodes like this in the past. Pt has hx of seizures which she reports occurred about twice a month and consisted of staring spells where she was unaware of her surroundings for a few minutes but no tremors, myoclonus or focal/generalized motor sx. Has had no seizures since beginning ASM "years ago." Pt reports her gabapentin was recently increased to 800mg TID and Depakote 500 BID. She had a neurology appt scheduled for this month but was unable to go d/t hospitalization.       Review of Systems:   CONSTITUTIONAL: No fevers or chills  NEUROLOGICAL: +As stated in HPI above  SKIN: No lesions   All other review of systems is negative unless indicated above.    Allergies:  lactose (Unknown)  shellfish (Other; Short breath)  No Known Drug Allergies      PMHx/PSHx/Family Hx: As above, otherwise see below   HTN (hypertension)    Sleep apnea    Seizure disorder    Diabetes    Morbid obesity        Social Hx:  No current use of tobacco, alcohol, or illicit drugs  Lives alone close to son and has an aide who comes in 4 hrs a day. Ambulates with a walker d/t right sided weakness and neuropathy.     Medications:  MEDICATIONS  (STANDING):  aspirin  chewable 81 milliGRAM(s) Oral daily  atorvastatin 80 milliGRAM(s) Oral at bedtime  bumetanide Injectable 2 milliGRAM(s) IV Push daily  divalproex  milliGRAM(s) Oral two times a day  gabapentin 300 milliGRAM(s) Oral three times a day  hydrALAZINE 50 milliGRAM(s) Oral three times a day  insulin lispro (ADMELOG) corrective regimen sliding scale   SubCutaneous three times a day before meals  insulin lispro (ADMELOG) corrective regimen sliding scale   SubCutaneous at bedtime  pantoprazole    Tablet 40 milliGRAM(s) Oral before breakfast  polyethylene glycol 3350 17 Gram(s) Oral at bedtime  sacubitril 24 mG/valsartan 26 mG 1 Tablet(s) Oral two times a day  senna 2 Tablet(s) Oral at bedtime    MEDICATIONS  (PRN):  acetaminophen     Tablet .. 650 milliGRAM(s) Oral every 6 hours PRN Temp greater or equal to 38C (100.4F), Mild Pain (1 - 3)  aluminum hydroxide/magnesium hydroxide/simethicone Suspension 30 milliLiter(s) Oral every 4 hours PRN Dyspepsia  melatonin 3 milliGRAM(s) Oral at bedtime PRN Insomnia  ondansetron Injectable 4 milliGRAM(s) IV Push every 8 hours PRN Nausea and/or Vomiting      Vitals:  T(C): 36.4 (06-17-25 @ 10:52), Max: 37.3 (06-16-25 @ 20:25)  HR: 96 (06-17-25 @ 12:33) (45 - 101)  BP: 104/67 (06-17-25 @ 10:52) (92/62 - 104/67)  RR: 18 (06-17-25 @ 10:52) (18 - 18)  SpO2: 100% (06-17-25 @ 12:33) (95% - 100%)    Physical Examination:   General - NAD, pleasant, cooperative   Neurologic Exam:  Mental status - Awake, Alert, Oriented to person, place, and time. Speech fluent but bradyphonic, repetition and naming intact. Follows simple and complex commands. Attention/concentration, and fund of knowledge intact    Cranial nerves:  CN II: Visual fields are full to confrontation.  Pupils are equal and reactive to light.  CN III, IV, VI: EOMI, no nystagmus, no ptosis, but pt closes eyes frequently.   CN V: Facial sensation is intact to touch in all 3 divisions bilaterally.  CN VII: Face is symmetric with normal eye closure and smile.  CN VII: Hearing is normal to rubbing fingers  CN IX, X: Phonation is normal.  CN XI: Head turning and shoulder shrug are intact  CN XII: Tongue is midline with normal movements and no atrophy.    Motor - Normal bulk and tone throughout. No pronator drift of out-stretched arms.  Strength testing limited by r. shoulder pain and effort-limited exam.            Deltoid(C5)  Biceps(C6)    Triceps(C7)     Wrist Extension    Wrist Flexion (C8)       R           4-                 4                        4-                    4                              4                    5  L            5                 5                        5                     5                              5                     5          Raised LE b/l against gravity, deferred further motor testing d/t neuropathic pain.    Sensation - Light touch/temperature intact throughout    DTR's -             Biceps      Triceps     Brachioradialis      Patellar    Ankle    Toes/plantar response  R             2+             2+                  2+                      Deferred d/t pt comfort  L              2+             2+                 2+                       Deferred d/t pt comfort                    Coordination -  fine finger movements are intact. There is no dysmetria on finger-to-nose although slight hand tremor.    Gait and station - deferred for pt comfort.    [facility] SS:  DATE:  TIME:  1A: Level of consciousness (0-3): 0  1B: Questions (0-2): 0    1C: Commands (0-2): 0  2: Gaze (0-2): 0  3: Visual fields (0-3): 0  4: Facial palsy (0-3): 0  MOTOR:  5A: Left arm motor drift (0-4): 0  5B: Right arm motor drift (0-4): 0  6A: Left leg motor drift (0-4): 2  6B: Right leg motor drift (0-4): 2  7: Limb ataxia (0-2): 0  SENSORY:  8: Sensation (0-2): 0  SPEECH:  9: Language (0-3): 0  10: Dysarthria (0-2): 0  EXTINCTION:  11: Extinction/inattention (0-2): 0    TOTAL SCORE:  4, b/l LE weakness may be effort-dependent and d/t neuropathic pain.      Labs:                        9.1    6.41  )-----------( 308      ( 17 Jun 2025 12:15 )             30.6     06-17    136  |  96  |  59[H]  ----------------------------<  143[H]  4.2   |  26  |  1.78[H]    Ca    9.6      17 Jun 2025 12:15  Phos  3.6     06-17  Mg     2.0     06-17    TPro  6.9  /  Alb  3.0[L]  /  TBili  0.2  /  DBili  x   /  AST  11  /  ALT  9[L]  /  AlkPhos  49  06-17    CAPILLARY BLOOD GLUCOSE      POCT Blood Glucose.: 163 mg/dL (17 Jun 2025 11:49)    LIVER FUNCTIONS - ( 17 Jun 2025 12:15 )  Alb: 3.0 g/dL / Pro: 6.9 g/dL / ALK PHOS: 49 U/L / ALT: 9 U/L / AST: 11 U/L / GGT: x             PT/INR - ( 17 Jun 2025 12:15 )   PT: 12.3 sec;   INR: 1.07 ratio         PTT - ( 17 Jun 2025 12:15 )  PTT:102.6 sec  CSF:                  Radiology:  CT Head No Cont:  (17 Jun 2025 12:27)  VENTRICLES AND SULCI: Age appropriate involutional changes.  INTRA-AXIAL: Old appearing lacunar infarct right lentiform nucleus. Small   left basal ganglia lacunar infarct noted as well. EXTRA-AXIAL: No mass or   fluid collection. Basal cisterns are normal in appearance.    VISUALIZED SINUSES:  Clear.  TYMPANOMASTOID CAVITIES:  Clear.  VISUALIZED ORBITS: Bilateral lens replacement.  CALVARIUM: Intact.    MISCELLANEOUS: None.      IMPRESSION:  No acute intracranial hemorrhage, mass effect, or midline shift.     Neurology - Consult Note    -  Spectra: [phone] ([facility]), [phone] ([facility])  -    HPI: Patient SHREE WYATT is a 69y (1955) woman with a PMH significant for HTN, DM2, VERONIKA, peripheral neuropathy, seizures, admitted for acute decompensated HF which initially needed CICU admission and was recently downgraded. Neurology consulted for an episode of altered mental status where pt was minimally responsive. Pt reports no recollection of the event and only remembers seeing her friend early in the morning and the first thing she remembers after that is feeling generalized pain. Collateral hx obtained from son at bedside who reports walking in to the room and seeing pt sleeping off to one side and drooling with a subjective facial droop. Pt was then minimally responsive to stimulation, lethargic, and not alert or oriented so nurse was called in who then called RRT to assess. Pt became more responsive when ABG was taken with yelling moving all extremities. Pt is now sitting comfortably in bed and eating lunch. Of note, PCO2- 57 at the time of RRT, patient was taken for a CTH without acute changes and was started on Bipap for around 30 mins with significant improvement afterwards witnessed by son at bedside.     She reports no episodes like this in the past. Pt has hx of seizures which she reports occurred about twice a month and consisted of staring spells where she was unaware of her surroundings for a few minutes but no tremors, myoclonus or focal/generalized motor sx. Has had no seizures since beginning ASM "years ago." Pt reports her gabapentin was recently increased to 800mg TID and Depakote 500 BID. She had a neurology appt scheduled for this month but was unable to go d/t hospitalization. No history of stroke in the past. Takes a baby aspirin and statin.       Review of Systems:   CONSTITUTIONAL: No fevers or chills  NEUROLOGICAL: +As stated in HPI above  SKIN: No lesions   All other review of systems is negative unless indicated above.    Allergies:  lactose (Unknown)  shellfish (Other; Short breath)  No Known Drug Allergies      PMHx/PSHx/Family Hx: As above, otherwise see below   HTN (hypertension)    Sleep apnea    Seizure disorder    Diabetes    Morbid obesity        Social Hx:  No current use of tobacco, alcohol, or illicit drugs  Lives alone close to son and has an aide who comes in 4 hrs a day. Ambulates with a walker d/t right sided weakness and neuropathy.     Medications:  MEDICATIONS  (STANDING):  aspirin  chewable 81 milliGRAM(s) Oral daily  atorvastatin 80 milliGRAM(s) Oral at bedtime  bumetanide Injectable 2 milliGRAM(s) IV Push daily  divalproex  milliGRAM(s) Oral two times a day  gabapentin 300 milliGRAM(s) Oral three times a day  hydrALAZINE 50 milliGRAM(s) Oral three times a day  insulin lispro (ADMELOG) corrective regimen sliding scale   SubCutaneous three times a day before meals  insulin lispro (ADMELOG) corrective regimen sliding scale   SubCutaneous at bedtime  pantoprazole    Tablet 40 milliGRAM(s) Oral before breakfast  polyethylene glycol 3350 17 Gram(s) Oral at bedtime  sacubitril 24 mG/valsartan 26 mG 1 Tablet(s) Oral two times a day  senna 2 Tablet(s) Oral at bedtime    MEDICATIONS  (PRN):  acetaminophen     Tablet .. 650 milliGRAM(s) Oral every 6 hours PRN Temp greater or equal to 38C (100.4F), Mild Pain (1 - 3)  aluminum hydroxide/magnesium hydroxide/simethicone Suspension 30 milliLiter(s) Oral every 4 hours PRN Dyspepsia  melatonin 3 milliGRAM(s) Oral at bedtime PRN Insomnia  ondansetron Injectable 4 milliGRAM(s) IV Push every 8 hours PRN Nausea and/or Vomiting      Vitals:  T(C): 36.4 (06-17-25 @ 10:52), Max: 37.3 (06-16-25 @ 20:25)  HR: 96 (06-17-25 @ 12:33) (45 - 101)  BP: 104/67 (06-17-25 @ 10:52) (92/62 - 104/67)  RR: 18 (06-17-25 @ 10:52) (18 - 18)  SpO2: 100% (06-17-25 @ 12:33) (95% - 100%)    Physical Examination:   General - NAD, pleasant, cooperative   Neurologic Exam:  Mental status - Awake, Alert, Oriented to person, place, and time ( everything but date and day of week). Speech fluent but bradyphonic, repetition and naming intact. Follows simple commands. Fund of knowledge intact    Cranial nerves:  CN II: Visual fields are full to confrontation.  Pupils are equal and reactive to light.  CN III, IV, VI: EOMI, no nystagmus, no ptosis, but pt closes eyes frequently.   CN V: Facial sensation is intact to touch in all 3 divisions bilaterally.  CN VII: Face is symmetric with normal eye closure and smile.  CN VII: Hearing is normal to rubbing fingers  CN IX, X: Phonation is normal.  CN XI: Head turning and shoulder shrug are intact  CN XII: Tongue is midline with normal movements and no atrophy.    Motor - Normal bulk and tone throughout. No pronator drift of out-stretched arms.  Strength testing limited by R. shoulder pain and effort-limited exam.            Deltoid(C5)  Biceps(C6)    Triceps(C7)     Wrist Extension    Wrist Flexion (C8)       R           4-                 4                        4-                    4                              4                    5  L            5                 5                        5                     5                              5                     5    Raised LE b/l against gravity (lifted L>R), deferred further motor testing d/t neuropathic pain. ( reports sciatica worse in R. compared to left)    Sensation - Light touch intact throughout    DTR's -             Biceps      Triceps     Brachioradialis      Patellar    Ankle    Toes/plantar response  R             2+             2+                  2+                      Deferred d/t pt comfort  L              2+             2+                 2+                       Deferred d/t pt comfort                    Coordination -  Fine finger movements are intact. There is mild dysmetria on finger-to-nose although slight hand tremor. Questionable resting tremor in L. hand, postural tremor in B/L UE with some intentional tremor component.     Gait and station - Declined by patient    [facility] SS:  DATE:  TIME:  1A: Level of consciousness (0-3): 0  1B: Questions (0-2): 0    1C: Commands (0-2): 0  2: Gaze (0-2): 0  3: Visual fields (0-3): 0  4: Facial palsy (0-3): 0  MOTOR:  5A: Left arm motor drift (0-4): 0  5B: Right arm motor drift (0-4): 0  6A: Left leg motor drift (0-4): 2  6B: Right leg motor drift (0-4): 2  7: Limb ataxia (0-2): 0  SENSORY:  8: Sensation (0-2): 0  SPEECH:  9: Language (0-3): 0  10: Dysarthria (0-2): 0  EXTINCTION:  11: Extinction/inattention (0-2): 0    TOTAL SCORE:  4, b/l LE weakness may be effort-dependent and d/t neuropathic pain.      Labs:                        9.1    6.41  )-----------( 308      ( 17 Jun 2025 12:15 )             30.6     06-17    136  |  96  |  59[H]  ----------------------------<  143[H]  4.2   |  26  |  1.78[H]    Ca    9.6      17 Jun 2025 12:15  Phos  3.6     06-17  Mg     2.0     06-17    TPro  6.9  /  Alb  3.0[L]  /  TBili  0.2  /  DBili  x   /  AST  11  /  ALT  9[L]  /  AlkPhos  49  06-17    CAPILLARY BLOOD GLUCOSE      POCT Blood Glucose.: 163 mg/dL (17 Jun 2025 11:49)    LIVER FUNCTIONS - ( 17 Jun 2025 12:15 )  Alb: 3.0 g/dL / Pro: 6.9 g/dL / ALK PHOS: 49 U/L / ALT: 9 U/L / AST: 11 U/L / GGT: x             PT/INR - ( 17 Jun 2025 12:15 )   PT: 12.3 sec;   INR: 1.07 ratio         PTT - ( 17 Jun 2025 12:15 )  PTT:102.6 sec        Radiology:  CT Head No Cont:  (17 Jun 2025 12:27)  VENTRICLES AND SULCI: Age appropriate involutional changes.  INTRA-AXIAL: Old appearing lacunar infarct right lentiform nucleus. Small   left basal ganglia lacunar infarct noted as well. EXTRA-AXIAL: No mass or   fluid collection. Basal cisterns are normal in appearance.    VISUALIZED SINUSES:  Clear.  TYMPANOMASTOID CAVITIES:  Clear.  VISUALIZED ORBITS: Bilateral lens replacement.  CALVARIUM: Intact.    MISCELLANEOUS: None.      IMPRESSION:  No acute intracranial hemorrhage, mass effect, or midline shift.

## 2025-06-17 NOTE — PROGRESS NOTE ADULT - PROBLEM SELECTOR PLAN 2
On 6/14/25, TTE with EF 30-35% with wall motion abnormalities. Diuresed with Bumex drip earlier in hospitalization.   - Treatment with diuresis per heart failure, follow up recommendations  - Pulmonology consult on 6/15/25 (called) for possible thoracentesis, follow up recommendations   - Isordil 10 mg TID   - Hydralazine 50 mg TID   - Heparin drip for now due to possible LV thrombus on echo on 6/14/25; obtaining new partial echo; if new echo is negative for LV thrombus, can stop heparin drip - no LV thrombus on repeat TTE, dc heparin gtt

## 2025-06-17 NOTE — PROGRESS NOTE ADULT - ASSESSMENT
69F with PMH of HTN, DM2, VERONIKA, sciatica, peripheral neuropathy, hx of seizure initially presented to [facility] on 6/10/25 with progressively worsening SOB, admitted for ADHF and possible pneumonia. Received IV diuretics (lasix) and antibiotics for pneumonia and was transferred to CCU. Ultimately transferred to [facility] CICU for further management. On arrival, she was on BIPAP & started on IV diuresis for respiratory distress & significant pulmonary edema. She has since been downgraded to telemetry & is on 2L NC.    Cardiac Studies:  - TTE 6/14/25: LVEF 30-35%, nml RV, TAPSE 2.0cm, nml LA/RA, no AR, no MR, trace TR, IVC nml 1.4cm  - TTE OSH: Reported LVEF 20% - need official records

## 2025-06-17 NOTE — PROGRESS NOTE ADULT - SUBJECTIVE AND OBJECTIVE BOX
Interval Events:      REVIEW OF SYSTEMS:  Negative except as documented above.      OBJECTIVE:  ICU Vital Signs Last 24 Hrs  T(C): 36.6 (17 Jun 2025 04:17), Max: 37.3 (16 Jun 2025 20:25)  T(F): 97.9 (17 Jun 2025 04:17), Max: 99.1 (16 Jun 2025 20:25)  HR: 91 (17 Jun 2025 06:45) (45 - 101)  BP: 96/58 (17 Jun 2025 06:45) (92/62 - 136/75)  BP(mean): --  ABP: --  ABP(mean): --  RR: 18 (17 Jun 2025 04:17) (18 - 18)  SpO2: 100% (17 Jun 2025 04:17) (95% - 100%)    O2 Parameters below as of 17 Jun 2025 04:17  Patient On (Oxygen Delivery Method): nasal cannula  O2 Flow (L/min): 2            06-16 @ 07:01  -  06-17 @ 07:00  --------------------------------------------------------  IN: 400 mL / OUT: 0 mL / NET: 400 mL      CAPILLARY BLOOD GLUCOSE      POCT Blood Glucose.: 183 mg/dL (17 Jun 2025 07:24)      PHYSICAL EXAM:  General: NAD  HEENT:  EOMI, sclera anicteric, moist mucus membranes  Neck: supple  Cardiovascular: RRR  Respiratory: CTAB, no wheezes, crackles, or rhonci  Abdomen: soft, nontender  Extremities: warm and well perfused, no edema, no clubbing  Skin: no rashes  Neurological: no focal deficits    HOSPITAL MEDICATIONS:  MEDICATIONS  (STANDING):  aspirin  chewable 81 milliGRAM(s) Oral daily  atorvastatin 80 milliGRAM(s) Oral at bedtime  bumetanide Injectable 2 milliGRAM(s) IV Push daily  divalproex  milliGRAM(s) Oral two times a day  gabapentin 300 milliGRAM(s) Oral three times a day  heparin  Infusion 1100 Unit(s)/Hr (20 mL/Hr) IV Continuous <Continuous>  hydrALAZINE 50 milliGRAM(s) Oral three times a day  insulin lispro (ADMELOG) corrective regimen sliding scale   SubCutaneous three times a day before meals  insulin lispro (ADMELOG) corrective regimen sliding scale   SubCutaneous at bedtime  pantoprazole    Tablet 40 milliGRAM(s) Oral before breakfast  polyethylene glycol 3350 17 Gram(s) Oral at bedtime  sacubitril 24 mG/valsartan 26 mG 1 Tablet(s) Oral two times a day  senna 2 Tablet(s) Oral at bedtime    MEDICATIONS  (PRN):  acetaminophen     Tablet .. 650 milliGRAM(s) Oral every 6 hours PRN Temp greater or equal to 38C (100.4F), Mild Pain (1 - 3)  aluminum hydroxide/magnesium hydroxide/simethicone Suspension 30 milliLiter(s) Oral every 4 hours PRN Dyspepsia  melatonin 3 milliGRAM(s) Oral at bedtime PRN Insomnia  ondansetron Injectable 4 milliGRAM(s) IV Push every 8 hours PRN Nausea and/or Vomiting      LABS:                        9.5    6.86  )-----------( 309      ( 16 Jun 2025 04:46 )             31.4     Hgb Trend: 9.5<--, 9.3<--, 9.2<--, 9.9<--  06-17    139  |  98  |  56[H]  ----------------------------<  112[H]  4.8   |  28  |  1.63[H]    Ca    10.1      17 Jun 2025 05:07  Mg     2.1     06-17    TPro  6.5  /  Alb  x   /  TBili  x   /  DBili  x   /  AST  x   /  ALT  x   /  AlkPhos  x   06-15    Creatinine Trend: 1.63<--, 1.23<--, 1.20<--, 1.32<--, 1.33<--, 1.30<--  PTT - ( 17 Jun 2025 05:07 )  PTT:71.2 sec  Urinalysis Basic - ( 17 Jun 2025 05:07 )    Color: x / Appearance: x / SG: x / pH: x  Gluc: 112 mg/dL / Ketone: x  / Bili: x / Urobili: x   Blood: x / Protein: x / Nitrite: x   Leuk Esterase: x / RBC: x / WBC x   Sq Epi: x / Non Sq Epi: x / Bacteria: x        Venous Blood Gas:  06-15 @ 13:11  7.41/51/62/32/94.5  VBG Lactate: 0.9      MICROBIOLOGY:       RADIOLOGY:  [x] Reviewed and interpreted by me

## 2025-06-17 NOTE — PROGRESS NOTE ADULT - PROBLEM SELECTOR PLAN 4
SW/CM Note    55 year old admitted 10/5/2022 as Observation with a diagnosis of ischemitic colitis. Prior to admission patient was living with Alone and  residing at House.  Patient  does  have a Power of  for Healthcare.  Document is not activated.  Agent is 1) Mily Deutsch 2) Zoila Egan. Patient’s Primary Care Provider is Jayden Cox MD. Per Andreas Phillips MD patient anticipated to discharge home with no needs. SW/CM will continue to follow for discharge planning and assistance if further needs.    Humaira Mccabe, MSW, APSW         - Divalproex 500 mg BID

## 2025-06-17 NOTE — EEG REPORT - NS EEG TEXT BOX
Patient Name: Brandy Waters    Age: 69 year  : 1955  Location: -  Referring Physician: -    EEG #: [id number]  Study Date: 2025   Start Time: 3:51:14 PM     Study Duration: 20.8 minutes    ------------------------------------------------------------------  Technical Information:					  On Instrument: [id number]  Placement and Labeling of Electrodes:  The EEG was performed utilizing 20 channels referential EEG connections (coronal over temporal over parasagittal montage) using all standard 10-20 electrode placements with EKG.  Recording was at a sampling rate of 256 samples per second per channel.  Time synchronized digital video recording was done simultaneously with EEG recording.  A low light infrared camera was used for low light recording.  Hayden and seizure detection algorithms were utilized.  ------------------------------------------------------------------  History:  -69 year old Female with AMS       Medication  Tylenol    Depakote (Valproic acid/Divalproex)    ------------------------------------------------------------------  Study Interpretation:    FINDINGS:    Background:  The background was continuous, spontaneously variable and reactive.  During wakefulness, the posteriorly dominant rhythm consisted of symmetric, poorly modulated 7-8 Hz activity, with an amplitude to 30 uV, that attenuated to eye opening.  Low amplitude central beta was noted in wakefulness.    GENERALIZED SLOWING: none was present.  FOCAL SLOWING: none was present.    Sleep Background:  Drowsiness was characterized by fragmentation, attenuation, and slowing of the background activity.    Stage II sleep transients were not recorded.      Non-epileptiform activity:  None.    Epileptiform Activity:   No epileptiform discharges were present.    Events:  No clinical events were recorded.  No seizures were recorded.    Activation Procedures:   Hyperventilation was not performed.    Photic stimulation was not performed.      Artifacts:  Intermittent myogenic and movement artifacts were noted.    ECG:  The heart rate on single channel ECG was predominantly between 70-80 BPM.  ------------------------------------------------------------------  EEG CLASSIFICATION:    Abnormal EEG in the awake, and drowsy states.  1.	    ------------------------------------------------------------------  CLINICAL IMPRESSION:    Mild nonspecific diffuse or multifocal cerebral dysfunction.   No epileptiform pattern or seizure recorded.  ------------------------------------------------------------------    ALEXA Houston  Attending Physician, [facility]    ------------------------------------  EEG Reading Room: [phone]  On Call Service After Hours: [phone]

## 2025-06-17 NOTE — PROGRESS NOTE ADULT - ATTENDING COMMENTS
68 yo F with PMHx DM, HTN, VERONIKA, sciatica, peripheral neuropathy, seizures initially presenting to [facility] on 6/10/2025 complaining of progressively worsening shortness of breath for 1 month, admitted for acute decompensated heart failure exacerbation. She was receiving lasix IV for diuresis & Rocephin for PNA. Transferred to CCU at outside hospital, and subsequently transferred to [facility] CICU. Patient was on Bumex drip now on Bumex IV BID. Pulmonary consulted due to hypoxemia with pleural effusions. As per HF team, this is HF of unclear cause with EF of 30-35% w/ dyskinetic apex and with no prior TTE on chart. EKG with LBBB. Unaware of any ischemic evaluation thus far, alternative ddx under consideration is amyloidosis.  Cr increased today to 1.6.  Weaned to 1L NC today.    #HF, etiology unknown  #Bilateral pleural effusions  #Volume overload  #Hypercapneic/Hypoxic resp failure    Recommend:   - will repeat POCUS on 6/18 to evaluate effusions and determine appropriateness for diagnostic thoracentesis  - diuresis as per primary team, strict Is/Os and daily weights  - ordered for cardiac MRI to evaluate for amyloidosis  - continued need for Heparin gtt?, no e/o LV thrombus on echo  - continue BIPAP overnight and when napping given persistent hypercapnea  - incentive spirometry  - PT eval, OOB to chair as tolerated

## 2025-06-17 NOTE — PROGRESS NOTE ADULT - ASSESSMENT
69F with PMHx DM, HTN, VERONIKA, sciatica, peripheral neuropathy, seizures initially presenting to [facility] on 6/10/2025 complaining of progressively worsening shortness of breath for 1 month, admitted for acute decompensated heart failure exacerbation. She was receiving lasix IV for diuresis & Rocephin for PNA. Transferred to CCU at outside hospital, and subsequently transferred to [facility] CICU. Patient was on Bumex drip now discontinued with output of 2L since admitted.     Pulmonary consulted due to hypoxemia with pleural effusions. As per HF team, this is HF of unclear cause with Ef of 30-35% with no prior TTE on chart.     #HF on unclear cause  #Bilateral pleural effusions   - Would continue diuresis as per primary team with POCUS reassessment of change in pleural effusions - Output not charted?   - Strict I/Os with goal of net negative with dosing as per primary team    - At this time, no respiratory distress on 2L NC with no indication of urgent therapeutic thoracentesis   - Ischemic work up as per primary team, thoracentesis is not necessarily part of the algorithm for amyloidosis although if significant respiratory distress without improvement with diuresis could offer procedure - at this time on 2L NC   -  No evidence of LV thrombus to require heparin      69F with PMHx DM, HTN, VERONIKA, sciatica, peripheral neuropathy, seizures initially presenting to [facility] on 6/10/2025 complaining of progressively worsening shortness of breath for 1 month, admitted for acute decompensated heart failure exacerbation. She was receiving lasix IV for diuresis & Rocephin for PNA. Transferred to CCU at outside hospital, and subsequently transferred to [facility] CICU. Patient was on Bumex drip now discontinued with output of 2L since admitted.     Pulmonary consulted due to hypoxemia with pleural effusions. As per HF team, this is HF of unclear cause with Ef of 30-35% with no prior TTE on chart.     #HF on unclear cause  #Bilateral pleural effusions   - Would continue diuresis as per primary team with POCUS reassessment of change in pleural effusions - Output not charted?   - Strict I/Os with goal of net negative with dosing as per primary team    - Encephalopathic today, placed on BIPAP by RRT pending repeat gas  - Ischemic work up as per primary team, thoracentesis is not necessarily part of the algorithm for amyloidosis although if significant respiratory distress without improvement with diuresis could offer procedure - at this time on 2L NC   -  No evidence of LV thrombus to require heparin

## 2025-06-17 NOTE — PROGRESS NOTE ADULT - SUBJECTIVE AND OBJECTIVE BOX
[facility] Division of Hospital Medicine  Otilia Finn MD  MS Teams PREFERRED        SUBJECTIVE / OVERNIGHT EVENTS: Seen at bedside this morning, lethargic, RRT called.    MEDICATIONS  (STANDING):  aspirin  chewable 81 milliGRAM(s) Oral daily  atorvastatin 80 milliGRAM(s) Oral at bedtime  bumetanide Injectable 2 milliGRAM(s) IV Push daily  divalproex  milliGRAM(s) Oral two times a day  gabapentin 300 milliGRAM(s) Oral three times a day  hydrALAZINE 50 milliGRAM(s) Oral three times a day  insulin lispro (ADMELOG) corrective regimen sliding scale   SubCutaneous three times a day before meals  insulin lispro (ADMELOG) corrective regimen sliding scale   SubCutaneous at bedtime  pantoprazole    Tablet 40 milliGRAM(s) Oral before breakfast  polyethylene glycol 3350 17 Gram(s) Oral at bedtime  sacubitril 24 mG/valsartan 26 mG 1 Tablet(s) Oral two times a day  senna 2 Tablet(s) Oral at bedtime    MEDICATIONS  (PRN):  acetaminophen     Tablet .. 650 milliGRAM(s) Oral every 6 hours PRN Temp greater or equal to 38C (100.4F), Mild Pain (1 - 3)  aluminum hydroxide/magnesium hydroxide/simethicone Suspension 30 milliLiter(s) Oral every 4 hours PRN Dyspepsia  melatonin 3 milliGRAM(s) Oral at bedtime PRN Insomnia  ondansetron Injectable 4 milliGRAM(s) IV Push every 8 hours PRN Nausea and/or Vomiting      I&O's Summary    16 Jun 2025 07:01  -  17 Jun 2025 07:00  --------------------------------------------------------  IN: 400 mL / OUT: 0 mL / NET: 400 mL    17 Jun 2025 07:01  -  17 Jun 2025 15:04  --------------------------------------------------------  IN: 250 mL / OUT: 0 mL / NET: 250 mL        PHYSICAL EXAM:  Vital Signs Last 24 Hrs  T(C): 36.4 (17 Jun 2025 10:52), Max: 37.3 (16 Jun 2025 20:25)  T(F): 97.6 (17 Jun 2025 10:52), Max: 99.1 (16 Jun 2025 20:25)  HR: 96 (17 Jun 2025 12:33) (45 - 101)  BP: 104/67 (17 Jun 2025 10:52) (92/62 - 104/67)  BP(mean): --  RR: 18 (17 Jun 2025 10:52) (18 - 18)  SpO2: 100% (17 Jun 2025 12:33) (95% - 100%)    Parameters below as of 17 Jun 2025 10:52  Patient On (Oxygen Delivery Method): room air    PHYSICAL EXAM:  CONSTITUTIONAL: unwell appearing, breathing comfortably on room air  EYES: anicteric   HENT: oropharynx clear and moist, normocephalic  RESPIRATORY: normal respiratory effort; lungs are clear to auscultation bilaterally (no wheezes/crackles)  CARDIOVASCULAR: regular rate and rhythm, normal S1 and S2, no murmur/rub/gallop  ABDOMEN: positive bowel sounds, non-tender, non-distended, no organomegaly   : no Espinal catheter in place, no flank tenderness   MUSCULOSKELETAL:  moving all four extremities to pain   EXTREMITIES: 1+ pitting edema of bilateral lower extremities   NEUROLOGY: awake, alert, oriented to self, place, date, and situation obeys commands     LABS:                        9.1    6.41  )-----------( 308      ( 17 Jun 2025 12:15 )             30.6     06-17    136  |  96  |  59[H]  ----------------------------<  143[H]  4.2   |  26  |  1.78[H]    Ca    9.6      17 Jun 2025 12:15  Phos  3.6     06-17  Mg     2.0     06-17    TPro  6.9  /  Alb  3.0[L]  /  TBili  0.2  /  DBili  x   /  AST  11  /  ALT  9[L]  /  AlkPhos  49  06-17    PT/INR - ( 17 Jun 2025 12:15 )   PT: 12.3 sec;   INR: 1.07 ratio         PTT - ( 17 Jun 2025 12:15 )  PTT:102.6 sec      Urinalysis Basic - ( 17 Jun 2025 12:15 )    Color: x / Appearance: x / SG: x / pH: x  Gluc: 143 mg/dL / Ketone: x  / Bili: x / Urobili: x   Blood: x / Protein: x / Nitrite: x   Leuk Esterase: x / RBC: x / WBC x   Sq Epi: x / Non Sq Epi: x / Bacteria: x

## 2025-06-17 NOTE — PROGRESS NOTE ADULT - ATTENDING COMMENTS
Pt appears somnolent during interview.  She denies SOB but is coughing through the interview.  She appears intravascularly euvolemic.   Labs with rising creatinine. Also elevated free lich chains with normal ratio.   Plan  Stop diuretics  Eventual LHC/RHC when creatinine improves  Obtain serum/urine immunofixation and consider hematology c/s for elevated free light chains  Obtain cMRI to r/o infiltrative disease   Pulmonary to comment on prior chest CT results  Continue Entresto 24/26mg BID, hydral 50mg TID. Pending BB, SGLT2i and MRA. Plan to wean off hydral and uptitrate ARNi.   Consider AMS work up including ABG, head CT and neuro c/s. Pt appears somnolent during interview.  She denies SOB but is coughing through the interview.  She appears intravascularly euvolemic.   Labs with rising creatinine. Also elevated free lich chains with normal ratio.   Plan  Continue bumex 2mg IV daily - recently downtitrated from BID  Eventual LHC/RHC when creatinine improves  Obtain serum/urine immunofixation and consider hematology c/s for elevated free light chains  Obtain cMRI to r/o infiltrative disease   Pulmonary to comment on prior chest CT results  Continue Entresto 24/26mg BID, hydral 50mg TID. Pending BB, SGLT2i and MRA. Plan to wean off hydral and uptitrate ARNi.   Consider AMS work up including ABG, head CT and neuro c/s.

## 2025-06-18 DIAGNOSIS — N17.9 ACUTE KIDNEY FAILURE, UNSPECIFIED: ICD-10-CM

## 2025-06-18 LAB
ADD ON TEST-SPECIMEN IN LAB: SIGNIFICANT CHANGE UP
ANION GAP SERPL CALC-SCNC: 11 MMOL/L — SIGNIFICANT CHANGE UP (ref 5–17)
BUN SERPL-MCNC: 67 MG/DL — HIGH (ref 7–23)
CALCIUM SERPL-MCNC: 9.9 MG/DL — SIGNIFICANT CHANGE UP (ref 8.4–10.5)
CHLORIDE SERPL-SCNC: 96 MMOL/L — SIGNIFICANT CHANGE UP (ref 96–108)
CO2 SERPL-SCNC: 28 MMOL/L — SIGNIFICANT CHANGE UP (ref 22–31)
CREAT SERPL-MCNC: 1.7 MG/DL — HIGH (ref 0.5–1.3)
EGFR: 32 ML/MIN/1.73M2 — LOW
EGFR: 32 ML/MIN/1.73M2 — LOW
GLUCOSE BLDC GLUCOMTR-MCNC: 105 MG/DL — HIGH (ref 70–99)
GLUCOSE BLDC GLUCOMTR-MCNC: 106 MG/DL — HIGH (ref 70–99)
GLUCOSE BLDC GLUCOMTR-MCNC: 133 MG/DL — HIGH (ref 70–99)
GLUCOSE BLDC GLUCOMTR-MCNC: 164 MG/DL — HIGH (ref 70–99)
GLUCOSE BLDC GLUCOMTR-MCNC: 85 MG/DL — SIGNIFICANT CHANGE UP (ref 70–99)
GLUCOSE SERPL-MCNC: 101 MG/DL — HIGH (ref 70–99)
HCT VFR BLD CALC: 30.4 % — LOW (ref 34.5–45)
HGB BLD-MCNC: 9.1 G/DL — LOW (ref 11.5–15.5)
MAGNESIUM SERPL-MCNC: 2 MG/DL — SIGNIFICANT CHANGE UP (ref 1.6–2.6)
MCHC RBC-ENTMCNC: 27.3 PG — SIGNIFICANT CHANGE UP (ref 27–34)
MCHC RBC-ENTMCNC: 29.9 G/DL — LOW (ref 32–36)
MCV RBC AUTO: 91.3 FL — SIGNIFICANT CHANGE UP (ref 80–100)
NRBC # BLD AUTO: 0 K/UL — SIGNIFICANT CHANGE UP (ref 0–0)
NRBC # FLD: 0 K/UL — SIGNIFICANT CHANGE UP (ref 0–0)
NRBC BLD AUTO-RTO: 0 /100 WBCS — SIGNIFICANT CHANGE UP (ref 0–0)
PLATELET # BLD AUTO: 288 K/UL — SIGNIFICANT CHANGE UP (ref 150–400)
PMV BLD: 11.7 FL — SIGNIFICANT CHANGE UP (ref 7–13)
POTASSIUM SERPL-MCNC: 4.3 MMOL/L — SIGNIFICANT CHANGE UP (ref 3.5–5.3)
POTASSIUM SERPL-SCNC: 4.3 MMOL/L — SIGNIFICANT CHANGE UP (ref 3.5–5.3)
RBC # BLD: 3.33 M/UL — LOW (ref 3.8–5.2)
RBC # FLD: 16.9 % — HIGH (ref 10.3–14.5)
SODIUM SERPL-SCNC: 135 MMOL/L — SIGNIFICANT CHANGE UP (ref 135–145)
WBC # BLD: 7.13 K/UL — SIGNIFICANT CHANGE UP (ref 3.8–10.5)
WBC # FLD AUTO: 7.13 K/UL — SIGNIFICANT CHANGE UP (ref 3.8–10.5)

## 2025-06-18 PROCEDURE — 99223 1ST HOSP IP/OBS HIGH 75: CPT | Mod: GC

## 2025-06-18 PROCEDURE — 99291 CRITICAL CARE FIRST HOUR: CPT

## 2025-06-18 PROCEDURE — 76604 US EXAM CHEST: CPT | Mod: 26

## 2025-06-18 PROCEDURE — 70551 MRI BRAIN STEM W/O DYE: CPT | Mod: 26

## 2025-06-18 PROCEDURE — 99233 SBSQ HOSP IP/OBS HIGH 50: CPT | Mod: GC,25

## 2025-06-18 RX ORDER — CALCIUM CARBONATE 750 MG/1
1 TABLET ORAL ONCE
Refills: 0 | Status: COMPLETED | OUTPATIENT
Start: 2025-06-18 | End: 2025-06-18

## 2025-06-18 RX ORDER — ATORVASTATIN CALCIUM 80 MG/1
1 TABLET, FILM COATED ORAL
Refills: 0 | DISCHARGE

## 2025-06-18 RX ADMIN — Medication 40 MILLIGRAM(S): at 09:02

## 2025-06-18 RX ADMIN — POLYETHYLENE GLYCOL 3350 17 GRAM(S): 17 POWDER, FOR SOLUTION ORAL at 21:03

## 2025-06-18 RX ADMIN — GABAPENTIN 300 MILLIGRAM(S): 400 CAPSULE ORAL at 15:10

## 2025-06-18 RX ADMIN — CALCIUM CARBONATE 1 TABLET(S): 750 TABLET ORAL at 21:04

## 2025-06-18 RX ADMIN — Medication 81 MILLIGRAM(S): at 15:10

## 2025-06-18 RX ADMIN — Medication 500 MILLIGRAM(S): at 18:04

## 2025-06-18 RX ADMIN — BUMETANIDE 2 MILLIGRAM(S): 1 TABLET ORAL at 05:35

## 2025-06-18 RX ADMIN — SACUBITRIL AND VALSARTAN 1 TABLET(S): 6; 6 PELLET ORAL at 05:34

## 2025-06-18 RX ADMIN — GABAPENTIN 300 MILLIGRAM(S): 400 CAPSULE ORAL at 21:05

## 2025-06-18 RX ADMIN — SACUBITRIL AND VALSARTAN 1 TABLET(S): 6; 6 PELLET ORAL at 18:04

## 2025-06-18 RX ADMIN — Medication 50 MILLIGRAM(S): at 05:33

## 2025-06-18 RX ADMIN — Medication 500 MILLIGRAM(S): at 05:34

## 2025-06-18 RX ADMIN — ATORVASTATIN CALCIUM 80 MILLIGRAM(S): 80 TABLET, FILM COATED ORAL at 21:04

## 2025-06-18 RX ADMIN — GABAPENTIN 300 MILLIGRAM(S): 400 CAPSULE ORAL at 05:33

## 2025-06-18 RX ADMIN — Medication 50 MILLIGRAM(S): at 21:05

## 2025-06-18 RX ADMIN — Medication 50 MILLIGRAM(S): at 15:11

## 2025-06-18 RX ADMIN — Medication 2 TABLET(S): at 21:05

## 2025-06-18 NOTE — PROGRESS NOTE ADULT - PROBLEM SELECTOR PLAN 8
Unclear indication for pantoprazole; will continue for now, but discontinue if not part of home regimen.    General  - DVT prophylaxis: heparin drip  - dc and start HSQ  - Diet: DASH   - Medication reconciliation: incomplete, son to bring in home medications on 6/15/25 in evening   - Code: Full   - Medications: Tylenol 650 mg q6h as needed for pain, Maalox 30 mL q4h as needed for acid reflux, melatonin 3 mg bedtime as needed for insomnia, Zofran 4 mg IV q8h as needed for nausea/vomiting     Disposition  - Needed before discharge: off oxygen for 24 hours, cardiac and pulmonary work up, PT evaluation   - Anticipated discharge date: 6/17/25   - Discharge to: pending PT   - Appointments after discharge: PCP, heart failure     Plan discussed with patient in detail. Patient agrees with plan. All questions answered. Patient to update family; offered to do this myself, but the patient does not want me to disturb her son at this time. - Gabapentin 300 mg TID

## 2025-06-18 NOTE — PROGRESS NOTE ADULT - ASSESSMENT
Ms Waters is a 69 yr old F, with PMH of HTN, DM2, VERONIKA, sciatica, peripheral neuropathy, hx of seizure initially presented to [facility] on 6/10/25 with progressively worsening SOB, admitted for ADHF and possible pneumonia. Received IV diuretics (lasix) and antibiotics for pneumonia and was transferred to CCU. Ultimately transferred to [facility] CICU for further management. Diuresed, found with LVEF 30-35% has LBBB    now off IV bumex drip but is SOB on minimal exertion CHF meds being optimized, still elevate creatinine, on Bumex 2 mg IVP as per CHF    for possible thoracentesis        advise    1.continue CHF meds as per CHF service, please contact them in AM re, dosing of bumex  2.  pulmonary evaluation appreciated , for possible thoracentesis  3. MRI no stroke, receiving bipap, felt to be somulent due to hypoventillation,   4. eventually needs L and R HC , and possible CRT ICD ( LBBB)

## 2025-06-18 NOTE — PROGRESS NOTE ADULT - CRITICAL CARE ATTENDING COMMENT
RRT called and time spent reviewing labs, medical decision making, ordering tests and providing direct patient care, communicating with care team and family members.
RRT called and time spent reviewing labs, medical decision making, ordering tests and providing direct patient care, communicating with care team and family members.

## 2025-06-18 NOTE — PROGRESS NOTE ADULT - PROBLEM SELECTOR PLAN 3
Hemoglobin a1c 5.3% on admission.   - Hold home oral hypoglycemic medication   - Insulin lispro sliding scale with meals and at bedtime   - Goal glucose 140-180 while inpatient MIKAELA on CKD3b    Cr increasing due to diuresis  will continue to monitor and wean Bumex after thoracentesis

## 2025-06-18 NOTE — PHARMACOTHERAPY INTERVENTION NOTE - COMMENTS
Performed medication reconciliation and home medication list updated in prescription writer/outpatient medication review. Medications verified with patient's both pharmacy, [facility] and "[facility]."    Home Pharmacy: [facility]  Home Medications:  atorvastatin 10 mg oral tablet: 1 tab(s) orally once a day (at bedtime)  benazepril 20 mg oral tablet: 1 tab(s) orally once a day  furosemide 20 mg oral tablet: 1 tab(s) orally once a day  gabapentin 800 mg oral tablet: 1 tab(s) orally 3 times a day  hydroCHLOROthiazide 25 mg oral tablet: 1 tab(s) orally once a day  metFORMIN 1000 mg oral tablet: 1 tab(s) orally 2 times a day  metFORMIN 1000 mg oral tablet: 1 tab(s) orally 2 times a day  metoprolol succinate 100 mg oral tablet, extended release: 1 tab(s) orally once a day  PARoxetine 20 mg oral tablet: 1 tab(s) orally once a day  Ventolin 90 mcg/inh inhalation aerosol: 2 puff(s) inhaled every 4 to 6 hours as needed for  shortness of breath and/or wheezing    Dameon (Zeyad Vazquez) Jaret - PharmD, BCPS  Transitions of Care Pharmacist  Available on [employer] Teams (Preferred)

## 2025-06-18 NOTE — PROGRESS NOTE ADULT - PROBLEM SELECTOR PLAN 4
- Divalproex 500 mg BID Hemoglobin a1c 5.3% on admission.   - Hold home oral hypoglycemic medication   - Insulin lispro sliding scale with meals and at bedtime   - Goal glucose 140-180 while inpatient

## 2025-06-18 NOTE — PROGRESS NOTE ADULT - PROBLEM SELECTOR PLAN 2
On 6/14/25, TTE with EF 30-35% with wall motion abnormalities. Diuresed with Bumex drip earlier in hospitalization.   - Treatment with diuresis per heart failure, follow up recommendations  - Pulmonology consult on 6/15/25 (called) for possible thoracentesis, follow up recommendations  - Isordil 10 mg TID   - Hydralazine 50 mg TID   - Heparin drip discontinued - no LV thrombus on repeat TTE  pending cMRI   Will eventually need right and LHC

## 2025-06-18 NOTE — PROGRESS NOTE ADULT - PROBLEM SELECTOR PLAN 1
lost 20 pounds over the past 6 months from dieting Patient presents with new onset oxygen needs. Likely due to acute decompensated heart failure.   - Treat heart failure below   - Wean oxygen as tolerated, goal SpO2>92%  - Stop DuoNeb as no clear indication  Altered mental status due to Acute Metabolic Encephalopathy from Acute Hypoxic Hypercarbic Respiratory Failure  RRT 6/17 -CT head negative  EEG with nonspecific slowing but no sign of seizure  Neurology consulted, recommendations appreciated  blood gas shows PCO2 of 57  wore Bipap for additional 90 minutes 6/17and mental status improved to baseline Patient presents with new onset oxygen needs. Likely due to acute decompensated heart failure.   - Treat heart failure below   - Wean oxygen as tolerated, goal SpO2>92%  - Stop DuoNeb as no clear indication  Altered mental status due to Acute Metabolic Encephalopathy from Acute Hypoxic Hypercarbic Respiratory Failure  RRT 6/17 -CT head negative  EEG with nonspecific slowing but no sign of seizure  Neurology consulted, recommendations appreciated  blood gas showed PCO2 of 57 on 6/17  wore Bipap for additional 90 minutes 6/17and mental status improved to baseline  RRT again today, better than yesterday's event, will do biPAP again  d/w Pulm - plan for left sided thoracentesis today

## 2025-06-18 NOTE — PROGRESS NOTE ADULT - SUBJECTIVE AND OBJECTIVE BOX
Interval Events:      REVIEW OF SYSTEMS:  Negative except as documented above.      OBJECTIVE:  ICU Vital Signs Last 24 Hrs  T(C): 37.2 (18 Jun 2025 05:38), Max: 37.2 (18 Jun 2025 05:38)  T(F): 98.9 (18 Jun 2025 05:38), Max: 98.9 (18 Jun 2025 05:38)  HR: 98 (18 Jun 2025 07:50) (90 - 101)  BP: 119/75 (18 Jun 2025 05:38) (93/60 - 119/75)  BP(mean): --  ABP: --  ABP(mean): --  RR: 18 (18 Jun 2025 05:38) (18 - 18)  SpO2: 94% (18 Jun 2025 07:50) (93% - 100%)    O2 Parameters below as of 18 Jun 2025 05:38  Patient On (Oxygen Delivery Method): nasal cannula  O2 Flow (L/min): 3            06-17 @ 07:01  -  06-18 @ 07:00  --------------------------------------------------------  IN: 250 mL / OUT: 300 mL / NET: -50 mL      CAPILLARY BLOOD GLUCOSE      POCT Blood Glucose.: 133 mg/dL (18 Jun 2025 07:16)      PHYSICAL EXAM:  General: NAD  HEENT:  EOMI, sclera anicteric, moist mucus membranes  Neck: supple  Cardiovascular: RRR  Respiratory: CTAB, no wheezes, crackles, or rhonci  Abdomen: soft, nontender  Extremities: warm and well perfused, no edema, no clubbing  Skin: no rashes  Neurological: no focal deficits    HOSPITAL MEDICATIONS:  MEDICATIONS  (STANDING):  aspirin  chewable 81 milliGRAM(s) Oral daily  atorvastatin 80 milliGRAM(s) Oral at bedtime  bumetanide Injectable 2 milliGRAM(s) IV Push daily  divalproex  milliGRAM(s) Oral two times a day  gabapentin 300 milliGRAM(s) Oral three times a day  hydrALAZINE 50 milliGRAM(s) Oral three times a day  insulin lispro (ADMELOG) corrective regimen sliding scale   SubCutaneous three times a day before meals  insulin lispro (ADMELOG) corrective regimen sliding scale   SubCutaneous at bedtime  pantoprazole    Tablet 40 milliGRAM(s) Oral before breakfast  polyethylene glycol 3350 17 Gram(s) Oral at bedtime  sacubitril 24 mG/valsartan 26 mG 1 Tablet(s) Oral two times a day  senna 2 Tablet(s) Oral at bedtime    MEDICATIONS  (PRN):  acetaminophen     Tablet .. 650 milliGRAM(s) Oral every 6 hours PRN Temp greater or equal to 38C (100.4F), Mild Pain (1 - 3)  aluminum hydroxide/magnesium hydroxide/simethicone Suspension 30 milliLiter(s) Oral every 4 hours PRN Dyspepsia  melatonin 3 milliGRAM(s) Oral at bedtime PRN Insomnia  ondansetron Injectable 4 milliGRAM(s) IV Push every 8 hours PRN Nausea and/or Vomiting      LABS:                        9.1    7.13  )-----------( 288      ( 18 Jun 2025 06:16 )             30.4     Hgb Trend: 9.1<--, 9.1<--, 9.5<--, 9.3<--, 9.2<--  06-18    135  |  96  |  67[H]  ----------------------------<  101[H]  4.3   |  28  |  1.70[H]    Ca    9.9      18 Jun 2025 06:15  Phos  3.6     06-17  Mg     2.0     06-18    TPro  6.9  /  Alb  3.0[L]  /  TBili  0.2  /  DBili  x   /  AST  11  /  ALT  9[L]  /  AlkPhos  49  06-17    Creatinine Trend: 1.70<--, 1.78<--, 1.63<--, 1.23<--, 1.20<--, 1.32<--  PT/INR - ( 17 Jun 2025 12:15 )   PT: 12.3 sec;   INR: 1.07 ratio         PTT - ( 17 Jun 2025 12:15 )  PTT:102.6 sec  Urinalysis Basic - ( 18 Jun 2025 06:15 )    Color: x / Appearance: x / SG: x / pH: x  Gluc: 101 mg/dL / Ketone: x  / Bili: x / Urobili: x   Blood: x / Protein: x / Nitrite: x   Leuk Esterase: x / RBC: x / WBC x   Sq Epi: x / Non Sq Epi: x / Bacteria: x      Arterial Blood Gas:  06-17 @ 12:15  7.38/57/165/34/99.8/7.3  ABG lactate: --        MICROBIOLOGY:       RADIOLOGY:  [x] Reviewed and interpreted by me

## 2025-06-18 NOTE — PROGRESS NOTE ADULT - ASSESSMENT
69F with PMHx DM, HTN, VERONIKA, sciatica, peripheral neuropathy, seizures initially presenting to [facility] on 6/10/2025 complaining of progressively worsening shortness of breath for 1 month, admitted for acute decompensated heart failure exacerbation. She was receiving lasix IV for diuresis & Rocephin for PNA. Transferred to CCU at outside hospital, and subsequently transferred to [facility] CICU. Patient was on Bumex drip now discontinued with output of 2L since admitted.     Pulmonary consulted due to hypoxemia with pleural effusions. As per HF team, this is HF of unclear cause with Ef of 30-35% with no prior TTE on chart.     #HF on unclear cause  #Bilateral pleural effusions   #VERONIKA with CPAP at home  - Patient with recurrent events of somnolence likely due to hypercarbia that improve with NIV   - Will perform thoracentesis tomorrow to assist on pulmonary mechanics, please hold DVT prophylaxis in AM  - Strict I/Os with goal of net negative with dosing as per primary team    - Encephalopathic today, placed on BIPAP by RRT pending repeat gas  - Ischemic work up as per primary team, thoracentesis is not necessarily part of the algorithm for amyloidosis although if significant respiratory distress without improvement with diuresis could offer procedure - at this time on 2L NC   -  No evidence of LV thrombus to require heparin

## 2025-06-18 NOTE — PROGRESS NOTE ADULT - PROBLEM SELECTOR PLAN 7
- Gabapentin 300 mg TID Supposed to use CPAP at home, but does not use. VBG on 6/15/25 while sleeping shows carbon dioxide retention, likely caused obtunded status.   - BIPAP nocturnal while inpatient

## 2025-06-18 NOTE — PROGRESS NOTE ADULT - SUBJECTIVE AND OBJECTIVE BOX
[facility] Division of Hospital Medicine  Otilia Fnin MD  MS Teams PREFERRED        SUBJECTIVE / OVERNIGHT EVENTS: Seen at bedside. NAD. Went down for MRI and could not complete cMRI due to agitation.    MEDICATIONS  (STANDING):  aspirin  chewable 81 milliGRAM(s) Oral daily  atorvastatin 80 milliGRAM(s) Oral at bedtime  bumetanide Injectable 2 milliGRAM(s) IV Push daily  divalproex  milliGRAM(s) Oral two times a day  gabapentin 300 milliGRAM(s) Oral three times a day  hydrALAZINE 50 milliGRAM(s) Oral three times a day  insulin lispro (ADMELOG) corrective regimen sliding scale   SubCutaneous three times a day before meals  insulin lispro (ADMELOG) corrective regimen sliding scale   SubCutaneous at bedtime  pantoprazole    Tablet 40 milliGRAM(s) Oral before breakfast  polyethylene glycol 3350 17 Gram(s) Oral at bedtime  sacubitril 24 mG/valsartan 26 mG 1 Tablet(s) Oral two times a day  senna 2 Tablet(s) Oral at bedtime    MEDICATIONS  (PRN):  acetaminophen     Tablet .. 650 milliGRAM(s) Oral every 6 hours PRN Temp greater or equal to 38C (100.4F), Mild Pain (1 - 3)  aluminum hydroxide/magnesium hydroxide/simethicone Suspension 30 milliLiter(s) Oral every 4 hours PRN Dyspepsia  melatonin 3 milliGRAM(s) Oral at bedtime PRN Insomnia  ondansetron Injectable 4 milliGRAM(s) IV Push every 8 hours PRN Nausea and/or Vomiting      I&O's Summary    17 Jun 2025 07:01  -  18 Jun 2025 07:00  --------------------------------------------------------  IN: 250 mL / OUT: 300 mL / NET: -50 mL        PHYSICAL EXAM:  Vital Signs Last 24 Hrs  T(C): 37.2 (18 Jun 2025 05:38), Max: 37.2 (18 Jun 2025 05:38)  T(F): 98.9 (18 Jun 2025 05:38), Max: 98.9 (18 Jun 2025 05:38)  HR: 98 (18 Jun 2025 07:50) (91 - 101)  BP: 119/75 (18 Jun 2025 05:38) (93/60 - 119/75)  BP(mean): --  RR: 18 (18 Jun 2025 05:38) (18 - 18)  SpO2: 94% (18 Jun 2025 07:50) (93% - 100%)    Parameters below as of 18 Jun 2025 05:38  Patient On (Oxygen Delivery Method): nasal cannula  O2 Flow (L/min): 3    PHYSICAL EXAM:  CONSTITUTIONAL: unwell appearing, breathing comfortably on room air  EYES: anicteric   HENT: oropharynx clear and moist, normocephalic  RESPIRATORY: normal respiratory effort; lungs are clear to auscultation bilaterally (no wheezes/crackles)  CARDIOVASCULAR: regular rate and rhythm, normal S1 and S2, no murmur/rub/gallop  ABDOMEN: positive bowel sounds, non-tender, non-distended, no organomegaly   : no Espinal catheter in place, no flank tenderness   MUSCULOSKELETAL:  moving all four extremities to pain   EXTREMITIES: 1+ pitting edema of bilateral lower extremities   NEUROLOGY: awake, alert, oriented to self, place, date, and situation obeys commands     LABS:                        9.1    7.13  )-----------( 288      ( 18 Jun 2025 06:16 )             30.4     06-18    135  |  96  |  67[H]  ----------------------------<  101[H]  4.3   |  28  |  1.70[H]    Ca    9.9      18 Jun 2025 06:15  Phos  3.6     06-17  Mg     2.0     06-18    TPro  6.9  /  Alb  3.0[L]  /  TBili  0.2  /  DBili  x   /  AST  11  /  ALT  9[L]  /  AlkPhos  49  06-17    PT/INR - ( 17 Jun 2025 12:15 )   PT: 12.3 sec;   INR: 1.07 ratio         PTT - ( 17 Jun 2025 12:15 )  PTT:102.6 sec      Urinalysis Basic - ( 18 Jun 2025 06:15 )    Color: x / Appearance: x / SG: x / pH: x  Gluc: 101 mg/dL / Ketone: x  / Bili: x / Urobili: x   Blood: x / Protein: x / Nitrite: x   Leuk Esterase: x / RBC: x / WBC x   Sq Epi: x / Non Sq Epi: x / Bacteria: x     [facility] Division of Hospital Medicine  Otilia Finn MD  MS Teams PREFERRED        SUBJECTIVE / OVERNIGHT EVENTS: Seen at bedside during RRT. Lethargic but less so than yesterday, responding to questions, voice is full. Place on BiPAP again now. Went down for MRI and could not complete cMRI due to agitation.    MEDICATIONS  (STANDING):  aspirin  chewable 81 milliGRAM(s) Oral daily  atorvastatin 80 milliGRAM(s) Oral at bedtime  bumetanide Injectable 2 milliGRAM(s) IV Push daily  divalproex  milliGRAM(s) Oral two times a day  gabapentin 300 milliGRAM(s) Oral three times a day  hydrALAZINE 50 milliGRAM(s) Oral three times a day  insulin lispro (ADMELOG) corrective regimen sliding scale   SubCutaneous three times a day before meals  insulin lispro (ADMELOG) corrective regimen sliding scale   SubCutaneous at bedtime  pantoprazole    Tablet 40 milliGRAM(s) Oral before breakfast  polyethylene glycol 3350 17 Gram(s) Oral at bedtime  sacubitril 24 mG/valsartan 26 mG 1 Tablet(s) Oral two times a day  senna 2 Tablet(s) Oral at bedtime    MEDICATIONS  (PRN):  acetaminophen     Tablet .. 650 milliGRAM(s) Oral every 6 hours PRN Temp greater or equal to 38C (100.4F), Mild Pain (1 - 3)  aluminum hydroxide/magnesium hydroxide/simethicone Suspension 30 milliLiter(s) Oral every 4 hours PRN Dyspepsia  melatonin 3 milliGRAM(s) Oral at bedtime PRN Insomnia  ondansetron Injectable 4 milliGRAM(s) IV Push every 8 hours PRN Nausea and/or Vomiting      I&O's Summary    17 Jun 2025 07:01  -  18 Jun 2025 07:00  --------------------------------------------------------  IN: 250 mL / OUT: 300 mL / NET: -50 mL        PHYSICAL EXAM:  Vital Signs Last 24 Hrs  T(C): 37.2 (18 Jun 2025 05:38), Max: 37.2 (18 Jun 2025 05:38)  T(F): 98.9 (18 Jun 2025 05:38), Max: 98.9 (18 Jun 2025 05:38)  HR: 98 (18 Jun 2025 07:50) (91 - 101)  BP: 119/75 (18 Jun 2025 05:38) (93/60 - 119/75)  BP(mean): --  RR: 18 (18 Jun 2025 05:38) (18 - 18)  SpO2: 94% (18 Jun 2025 07:50) (93% - 100%)    Parameters below as of 18 Jun 2025 05:38  Patient On (Oxygen Delivery Method): nasal cannula  O2 Flow (L/min): 3    PHYSICAL EXAM:  CONSTITUTIONAL: unwell appearing, breathing comfortably on room air  EYES: anicteric   HENT: oropharynx clear and moist, normocephalic  RESPIRATORY: normal respiratory effort; lungs are clear to auscultation bilaterally (no wheezes/crackles)  CARDIOVASCULAR: regular rate and rhythm, normal S1 and S2, no murmur/rub/gallop  ABDOMEN: positive bowel sounds, non-tender, non-distended, no organomegaly   : no Espinal catheter in place, no flank tenderness   MUSCULOSKELETAL:  moving all four extremities to pain   EXTREMITIES: 1+ pitting edema of bilateral lower extremities   NEUROLOGY: awake, alert, oriented to self, place, date, and situation obeys commands     LABS:                        9.1    7.13  )-----------( 288      ( 18 Jun 2025 06:16 )             30.4     06-18    135  |  96  |  67[H]  ----------------------------<  101[H]  4.3   |  28  |  1.70[H]    Ca    9.9      18 Jun 2025 06:15  Phos  3.6     06-17  Mg     2.0     06-18    TPro  6.9  /  Alb  3.0[L]  /  TBili  0.2  /  DBili  x   /  AST  11  /  ALT  9[L]  /  AlkPhos  49  06-17    PT/INR - ( 17 Jun 2025 12:15 )   PT: 12.3 sec;   INR: 1.07 ratio         PTT - ( 17 Jun 2025 12:15 )  PTT:102.6 sec      Urinalysis Basic - ( 18 Jun 2025 06:15 )    Color: x / Appearance: x / SG: x / pH: x  Gluc: 101 mg/dL / Ketone: x  / Bili: x / Urobili: x   Blood: x / Protein: x / Nitrite: x   Leuk Esterase: x / RBC: x / WBC x   Sq Epi: x / Non Sq Epi: x / Bacteria: x

## 2025-06-18 NOTE — PROGRESS NOTE ADULT - ATTENDING COMMENTS
70 yo F with PMHx DM, HTN, VERONIKA, sciatica, peripheral neuropathy, seizures initially presenting to [facility] on 6/10/2025 complaining of progressively worsening shortness of breath for 1 month, admitted for acute decompensated heart failure exacerbation. She was receiving lasix IV for diuresis & Rocephin for PNA. Transferred to CCU at outside hospital, and subsequently transferred to [facility] CICU. Patient was on Bumex drip now on Bumex IV BID. Pulmonary consulted due to hypoxemia with pleural effusions. As per HF team, this is HF of unclear cause with EF of 30-35% w/ dyskinetic apex and with no prior TTE on chart. EKG with LBBB. Unaware of any ischemic evaluation thus far, alternative ddx under consideration is amyloidosis.  Cr rising.  Remains on 1-2L NC. Waxing/waning mental status of unclear etiology.  S/p RRT today for the same.    #HF, etiology unknown  #Bilateral pleural effusions  #Volume overload  #Hypercapneic/Hypoxic resp failure  #MIKAELA    Recommend:   - repeat POCUS on 6/18 showing mild/moderate effusion on right, simple appearing, had discussed thoracentesis however will defer given mental status changes and nonurgent  - monitor renal fcn, Is/Os - MIKAELA possibly d/t Entresto?  no e/o hypotension  - ordered for cardiac MRI to evaluate for amyloidosis  - continue BIPAP overnight and when napping given persistent hypercapnea; AM VBG  - incentive spirometry  - PT eval, OOB to chair as tolerated

## 2025-06-18 NOTE — PROGRESS NOTE ADULT - PROBLEM SELECTOR PLAN 6
Supposed to use CPAP at home, but does not use. VBG on 6/15/25 while sleeping shows carbon dioxide retention, likely caused obtunded status.   - BIPAP nocturnal while inpatient - Aspirin 81 mg daily   - Atorvastatin 80 mg bedtime

## 2025-06-18 NOTE — PROGRESS NOTE ADULT - SUBJECTIVE AND OBJECTIVE BOX
INTERVAL HPI/OVERNIGHT EVENTS: less somulemt     being considered for thoracentesis by pulmonary    MEDICATIONS  (STANDING):  aspirin  chewable 81 milliGRAM(s) Oral daily  atorvastatin 80 milliGRAM(s) Oral at bedtime  bumetanide Injectable 2 milliGRAM(s) IV Push daily  divalproex  milliGRAM(s) Oral two times a day  gabapentin 300 milliGRAM(s) Oral three times a day  hydrALAZINE 50 milliGRAM(s) Oral three times a day  insulin lispro (ADMELOG) corrective regimen sliding scale   SubCutaneous three times a day before meals  insulin lispro (ADMELOG) corrective regimen sliding scale   SubCutaneous at bedtime  pantoprazole    Tablet 40 milliGRAM(s) Oral before breakfast  polyethylene glycol 3350 17 Gram(s) Oral at bedtime  sacubitril 24 mG/valsartan 26 mG 1 Tablet(s) Oral two times a day  senna 2 Tablet(s) Oral at bedtime    MEDICATIONS  (PRN):  acetaminophen     Tablet .. 650 milliGRAM(s) Oral every 6 hours PRN Temp greater or equal to 38C (100.4F), Mild Pain (1 - 3)  aluminum hydroxide/magnesium hydroxide/simethicone Suspension 30 milliLiter(s) Oral every 4 hours PRN Dyspepsia  melatonin 3 milliGRAM(s) Oral at bedtime PRN Insomnia  ondansetron Injectable 4 milliGRAM(s) IV Push every 8 hours PRN Nausea and/or Vomiting      Allergies    shellfish (Other; Short breath)  No Known Drug Allergies    Intolerances    lactose (Unknown)    ROS:  General: Pt denies recent weight loss/fever/chills    Neurological: denies numbness or  sensation loss    Cardiovascular: denies chest pain/palpitations/leg edema    Respiratory and Thorax: denies SOB/cough/wheezing    Gastrointestinal: denies abdominal pain/diarrhea/constipation/bloody stool    Genitourinary: denies urinary frequency/urgency/ dysuria    Musculoskeletal: denies joint pain or swelling, denies restricted motion    Hematologic: denies abnormal bleeding  	    	  	    		        	    	            Vital Signs Last 24 Hrs  T(C): 36.5 (18 Jun 2025 20:14), Max: 37.2 (18 Jun 2025 05:38)  T(F): 97.7 (18 Jun 2025 20:14), Max: 98.9 (18 Jun 2025 05:38)  HR: 94 (18 Jun 2025 20:14) (93 - 101)  BP: 103/67 (18 Jun 2025 20:14) (95/63 - 119/75)  BP(mean): --  RR: 18 (18 Jun 2025 20:14) (18 - 18)  SpO2: 100% (18 Jun 2025 20:14) (93% - 100%)    Parameters below as of 18 Jun 2025 20:14  Patient On (Oxygen Delivery Method): nasal cannula  O2 Flow (L/min): 3    Daily     Daily     06-17 @ 07:01  -  06-18 @ 07:00  --------------------------------------------------------  IN: 250 mL / OUT: 300 mL / NET: -50 mL    06-18 @ 07:01  -  06-18 @ 21:10  --------------------------------------------------------  IN: 80 mL / OUT: 0 mL / NET: 80 mL      Physical Exam:    wdwn female  no JVD  cor RRR  lung clear anteriorely decrease BS at bases  abd soft ext no edema      LABS:                        9.1    7.13  )-----------( 288      ( 18 Jun 2025 06:16 )             30.4     06-18    135  |  96  |  67[H]  ----------------------------<  101[H]  4.3   |  28  |  1.70[H]    Ca    9.9      18 Jun 2025 06:15  Phos  3.6     06-17  Mg     2.0     06-18    TPro  6.9  /  Alb  3.0[L]  /  TBili  0.2  /  DBili  x   /  AST  11  /  ALT  9[L]  /  AlkPhos  49  06-17    PT/INR - ( 17 Jun 2025 12:15 )   PT: 12.3 sec;   INR: 1.07 ratio         PTT - ( 17 Jun 2025 12:15 )  PTT:102.6 sec  Urinalysis Basic - ( 18 Jun 2025 06:15 )    Color: x / Appearance: x / SG: x / pH: x  Gluc: 101 mg/dL / Ketone: x  / Bili: x / Urobili: x   Blood: x / Protein: x / Nitrite: x   Leuk Esterase: x / RBC: x / WBC x   Sq Epi: x / Non Sq Epi: x / Bacteria: x        RADIOLOGY & ADDITIONAL TESTS:    TELE:    EKG:

## 2025-06-19 LAB
% GAMMA, URINE: 14.1 % — SIGNIFICANT CHANGE UP
24R-OH-CALCIDIOL SERPL-MCNC: 35.6 NG/ML — SIGNIFICANT CHANGE UP
24R-OH-CALCIDIOL SERPL-MCNC: 38.5 NG/ML — SIGNIFICANT CHANGE UP
ALBUMIN 24H MFR UR ELPH: 52.8 % — SIGNIFICANT CHANGE UP
ALBUMIN FLD-MCNC: 2.3 G/DL — SIGNIFICANT CHANGE UP
ALPHA1 GLOB 24H MFR UR ELPH: 14.2 % — SIGNIFICANT CHANGE UP
ALPHA2 GLOB 24H MFR UR ELPH: 10.6 % — SIGNIFICANT CHANGE UP
ANION GAP SERPL CALC-SCNC: 11 MMOL/L — SIGNIFICANT CHANGE UP (ref 5–17)
ANION GAP SERPL CALC-SCNC: 11 MMOL/L — SIGNIFICANT CHANGE UP (ref 5–17)
ANION GAP SERPL CALC-SCNC: 15 MMOL/L — SIGNIFICANT CHANGE UP (ref 5–17)
B PERT IGG+IGM PNL SER: CLEAR — SIGNIFICANT CHANGE UP
B-GLOBULIN 24H MFR UR ELPH: 8.3 % — SIGNIFICANT CHANGE UP
BASE EXCESS BLDA CALC-SCNC: 3.3 MMOL/L — HIGH (ref -2–3)
BUN SERPL-MCNC: 70 MG/DL — HIGH (ref 7–23)
BUN SERPL-MCNC: 73 MG/DL — HIGH (ref 7–23)
BUN SERPL-MCNC: 74 MG/DL — HIGH (ref 7–23)
CALCIUM SERPL-MCNC: 10.1 MG/DL — SIGNIFICANT CHANGE UP (ref 8.4–10.5)
CALCIUM SERPL-MCNC: 10.3 MG/DL — SIGNIFICANT CHANGE UP (ref 8.4–10.5)
CALCIUM SERPL-MCNC: 9.7 MG/DL — SIGNIFICANT CHANGE UP (ref 8.4–10.5)
CHLORIDE SERPL-SCNC: 92 MMOL/L — LOW (ref 96–108)
CHLORIDE SERPL-SCNC: 97 MMOL/L — SIGNIFICANT CHANGE UP (ref 96–108)
CHLORIDE SERPL-SCNC: 97 MMOL/L — SIGNIFICANT CHANGE UP (ref 96–108)
CO2 BLDA-SCNC: 29 MMOL/L — HIGH (ref 19–24)
CO2 SERPL-SCNC: 23 MMOL/L — SIGNIFICANT CHANGE UP (ref 22–31)
CO2 SERPL-SCNC: 29 MMOL/L — SIGNIFICANT CHANGE UP (ref 22–31)
CO2 SERPL-SCNC: 30 MMOL/L — SIGNIFICANT CHANGE UP (ref 22–31)
COLLECT DURATION TIME UR: 24 HR — SIGNIFICANT CHANGE UP
COLOR FLD: YELLOW
CREAT SERPL-MCNC: 1.46 MG/DL — HIGH (ref 0.5–1.3)
CREAT SERPL-MCNC: 1.48 MG/DL — HIGH (ref 0.5–1.3)
CREAT SERPL-MCNC: 1.48 MG/DL — HIGH (ref 0.5–1.3)
EGFR: 38 ML/MIN/1.73M2 — LOW
FLUID INTAKE SUBSTANCE CLASS: SIGNIFICANT CHANGE UP
GLUCOSE BLDC GLUCOMTR-MCNC: 122 MG/DL — HIGH (ref 70–99)
GLUCOSE BLDC GLUCOMTR-MCNC: 128 MG/DL — HIGH (ref 70–99)
GLUCOSE BLDC GLUCOMTR-MCNC: 182 MG/DL — HIGH (ref 70–99)
GLUCOSE BLDC GLUCOMTR-MCNC: 227 MG/DL — HIGH (ref 70–99)
GLUCOSE FLD-MCNC: 110 MG/DL — SIGNIFICANT CHANGE UP
GLUCOSE SERPL-MCNC: 103 MG/DL — HIGH (ref 70–99)
GLUCOSE SERPL-MCNC: 104 MG/DL — HIGH (ref 70–99)
GLUCOSE SERPL-MCNC: 307 MG/DL — HIGH (ref 70–99)
GRAM STN FLD: SIGNIFICANT CHANGE UP
HCO3 BLDA-SCNC: 28 MMOL/L — SIGNIFICANT CHANGE UP (ref 21–28)
HCT VFR BLD CALC: 30.8 % — LOW (ref 34.5–45)
HGB BLD-MCNC: 9.4 G/DL — LOW (ref 11.5–15.5)
INTERPRETATION 24H UR IFE-IMP: SIGNIFICANT CHANGE UP
INTERPRETATION SERPL IFE-IMP: SIGNIFICANT CHANGE UP
LDH SERPL L TO P-CCNC: 152 U/L — SIGNIFICANT CHANGE UP
LYMPHOCYTES # FLD: 69 % — SIGNIFICANT CHANGE UP
M PROTEIN 24H UR ELPH-MRATE: 0 % — SIGNIFICANT CHANGE UP
M PROTEIN 24H UR ELPH-MRATE: 0 MG/24HR — SIGNIFICANT CHANGE UP (ref 0–0)
M PROTEIN 24H UR ELPH-MRATE: 0 MG/DL — SIGNIFICANT CHANGE UP
MAGNESIUM SERPL-MCNC: 1.9 MG/DL — SIGNIFICANT CHANGE UP (ref 1.6–2.6)
MAGNESIUM SERPL-MCNC: 2 MG/DL — SIGNIFICANT CHANGE UP (ref 1.6–2.6)
MCHC RBC-ENTMCNC: 27.8 PG — SIGNIFICANT CHANGE UP (ref 27–34)
MCHC RBC-ENTMCNC: 30.5 G/DL — LOW (ref 32–36)
MCV RBC AUTO: 91.1 FL — SIGNIFICANT CHANGE UP (ref 80–100)
MESOTHL CELL # FLD: SIGNIFICANT CHANGE UP
MONOS+MACROS # FLD: 2 % — SIGNIFICANT CHANGE UP
NEUTROPHILS-BODY FLUID: 29 % — SIGNIFICANT CHANGE UP
NRBC # BLD AUTO: 0 K/UL — SIGNIFICANT CHANGE UP (ref 0–0)
NRBC # FLD: 0 K/UL — SIGNIFICANT CHANGE UP (ref 0–0)
NRBC BLD AUTO-RTO: 0 /100 WBCS — SIGNIFICANT CHANGE UP (ref 0–0)
PCO2 BLDA: 41 MMHG — SIGNIFICANT CHANGE UP (ref 32–45)
PH BLDA: 7.44 — SIGNIFICANT CHANGE UP (ref 7.35–7.45)
PH FLD: 7.53 — SIGNIFICANT CHANGE UP
PLATELET # BLD AUTO: 277 K/UL — SIGNIFICANT CHANGE UP (ref 150–400)
PMV BLD: 11.5 FL — SIGNIFICANT CHANGE UP (ref 7–13)
PO2 BLDA: 125 MMHG — HIGH (ref 83–108)
POTASSIUM SERPL-MCNC: 4.6 MMOL/L — SIGNIFICANT CHANGE UP (ref 3.5–5.3)
POTASSIUM SERPL-MCNC: 4.8 MMOL/L — SIGNIFICANT CHANGE UP (ref 3.5–5.3)
POTASSIUM SERPL-MCNC: 5.8 MMOL/L — HIGH (ref 3.5–5.3)
POTASSIUM SERPL-SCNC: 4.6 MMOL/L — SIGNIFICANT CHANGE UP (ref 3.5–5.3)
POTASSIUM SERPL-SCNC: 4.8 MMOL/L — SIGNIFICANT CHANGE UP (ref 3.5–5.3)
POTASSIUM SERPL-SCNC: 5.8 MMOL/L — HIGH (ref 3.5–5.3)
PROT ?TM UR-MCNC: 50 MG/DL — HIGH (ref 0–12)
PROT FLD-MCNC: 4.5 G/DL — SIGNIFICANT CHANGE UP
PROT PATTERN 24H UR ELPH-IMP: SIGNIFICANT CHANGE UP
PROTEIN QUANT CALC, URINE: 1100 MG/24 H — HIGH (ref 50–100)
RBC # BLD: 3.38 M/UL — LOW (ref 3.8–5.2)
RBC # FLD: 16.7 % — HIGH (ref 10.3–14.5)
RCV VOL RI: <2000 CELLS/UL — SIGNIFICANT CHANGE UP
SAO2 % BLDA: 99.2 % — HIGH (ref 94–98)
SODIUM SERPL-SCNC: 130 MMOL/L — LOW (ref 135–145)
SODIUM SERPL-SCNC: 137 MMOL/L — SIGNIFICANT CHANGE UP (ref 135–145)
SODIUM SERPL-SCNC: 138 MMOL/L — SIGNIFICANT CHANGE UP (ref 135–145)
SPECIMEN SOURCE: SIGNIFICANT CHANGE UP
TOTAL NUCLEATED CELL COUNT, BODY FLUID: <3 CELLS/UL — SIGNIFICANT CHANGE UP
TOTAL VOLUME - URINE: 2200 ML — SIGNIFICANT CHANGE UP
TUBE TYPE: SIGNIFICANT CHANGE UP
URINE CREATININE CALCULATION: 1.2 G/24 H — SIGNIFICANT CHANGE UP (ref 0.8–1.8)
VALPROATE SERPL-MCNC: 46 UG/ML — LOW (ref 50–100)
WBC # BLD: 9.49 K/UL — SIGNIFICANT CHANGE UP (ref 3.8–10.5)
WBC # FLD AUTO: 9.49 K/UL — SIGNIFICANT CHANGE UP (ref 3.8–10.5)
WBC COUNT.: <3 CELLS/UL — SIGNIFICANT CHANGE UP

## 2025-06-19 PROCEDURE — 88341 IMHCHEM/IMCYTCHM EA ADD ANTB: CPT | Mod: 26

## 2025-06-19 PROCEDURE — 99233 SBSQ HOSP IP/OBS HIGH 50: CPT

## 2025-06-19 PROCEDURE — 32555 ASPIRATE PLEURA W/ IMAGING: CPT | Mod: LT

## 2025-06-19 PROCEDURE — 71045 X-RAY EXAM CHEST 1 VIEW: CPT | Mod: 26

## 2025-06-19 PROCEDURE — 99233 SBSQ HOSP IP/OBS HIGH 50: CPT | Mod: GC,25

## 2025-06-19 PROCEDURE — 88313 SPECIAL STAINS GROUP 2: CPT | Mod: 26

## 2025-06-19 PROCEDURE — 88305 TISSUE EXAM BY PATHOLOGIST: CPT | Mod: 26

## 2025-06-19 PROCEDURE — 88112 CYTOPATH CELL ENHANCE TECH: CPT | Mod: 26

## 2025-06-19 PROCEDURE — 88342 IMHCHEM/IMCYTCHM 1ST ANTB: CPT | Mod: 26

## 2025-06-19 RX ORDER — OLANZAPINE 10 MG/1
2.5 TABLET ORAL ONCE
Refills: 0 | Status: DISCONTINUED | OUTPATIENT
Start: 2025-06-19 | End: 2025-06-23

## 2025-06-19 RX ADMIN — Medication 2 TABLET(S): at 21:37

## 2025-06-19 RX ADMIN — BUMETANIDE 2 MILLIGRAM(S): 1 TABLET ORAL at 05:48

## 2025-06-19 RX ADMIN — Medication 500 MILLIGRAM(S): at 05:48

## 2025-06-19 RX ADMIN — GABAPENTIN 300 MILLIGRAM(S): 400 CAPSULE ORAL at 21:37

## 2025-06-19 RX ADMIN — Medication 50 MILLIGRAM(S): at 13:23

## 2025-06-19 RX ADMIN — INSULIN LISPRO 1: 100 INJECTION, SOLUTION INTRAVENOUS; SUBCUTANEOUS at 11:38

## 2025-06-19 RX ADMIN — GABAPENTIN 300 MILLIGRAM(S): 400 CAPSULE ORAL at 05:48

## 2025-06-19 RX ADMIN — Medication 50 MILLIGRAM(S): at 05:48

## 2025-06-19 RX ADMIN — Medication 500 MILLIGRAM(S): at 17:21

## 2025-06-19 RX ADMIN — Medication 40 MILLIGRAM(S): at 05:48

## 2025-06-19 RX ADMIN — ATORVASTATIN CALCIUM 80 MILLIGRAM(S): 80 TABLET, FILM COATED ORAL at 21:37

## 2025-06-19 RX ADMIN — SACUBITRIL AND VALSARTAN 1 TABLET(S): 6; 6 PELLET ORAL at 17:23

## 2025-06-19 RX ADMIN — Medication 81 MILLIGRAM(S): at 11:39

## 2025-06-19 RX ADMIN — GABAPENTIN 300 MILLIGRAM(S): 400 CAPSULE ORAL at 13:23

## 2025-06-19 RX ADMIN — POLYETHYLENE GLYCOL 3350 17 GRAM(S): 17 POWDER, FOR SOLUTION ORAL at 21:37

## 2025-06-19 RX ADMIN — SACUBITRIL AND VALSARTAN 1 TABLET(S): 6; 6 PELLET ORAL at 05:48

## 2025-06-19 RX ADMIN — Medication 50 MILLIGRAM(S): at 21:37

## 2025-06-19 NOTE — PROGRESS NOTE ADULT - ATTENDING COMMENTS
68 yo F with PMHx DM, HTN, VERONIKA, sciatica, peripheral neuropathy, seizures initially presenting to [facility] on 6/10/2025 complaining of progressively worsening shortness of breath for 1 month, admitted for acute decompensated heart failure exacerbation. She was receiving lasix IV for diuresis & Rocephin for PNA. Transferred to CCU at outside hospital, and subsequently transferred to [facility] CICU. Patient was on Bumex drip now on Bumex IV BID. Pulmonary consulted due to hypoxemia with pleural effusions. As per HF team, this is HF of unclear cause with EF of 30-35% w/ dyskinetic apex and with no prior TTE on chart. EKG with LBBB. Unaware of any ischemic evaluation thus far, alternative ddx under consideration is amyloidosis.  Cr rising.  Remains on 1-2L NC. Waxing/waning mental status of unclear etiology.  S/p RRT today for the same.    #HF, etiology unknown  #Bilateral pleural effusions  #Volume overload  #Hypercapneic/Hypoxic resp failure  #MIKAELA  #Encephalopathy    Recommend:   - s/p LEFT thoracentesis 6/19 with removal of 600cc, f/u pleural fluid studies  - monitor renal fcn, Is/Os - MIKAELA possibly d/t Entresto?  no e/o hypotension  - ordered for cardiac MRI to evaluate for amyloidosis  - continue BIPAP overnight and when napping given persistent hypercapnea  - incentive spirometry  - PT eval, OOB to chair as tolerated

## 2025-06-19 NOTE — PROGRESS NOTE ADULT - SUBJECTIVE AND OBJECTIVE BOX
[facility] Division of Hospital Medicine  Otilia Finn MD  MS Teams PREFERRED        SUBJECTIVE / OVERNIGHT EVENTS: Seen at bedside. NAD, wearing BiPAP.     MEDICATIONS  (STANDING):  aspirin  chewable 81 milliGRAM(s) Oral daily  atorvastatin 80 milliGRAM(s) Oral at bedtime  bumetanide Injectable 2 milliGRAM(s) IV Push daily  divalproex  milliGRAM(s) Oral two times a day  gabapentin 300 milliGRAM(s) Oral three times a day  hydrALAZINE 50 milliGRAM(s) Oral three times a day  insulin lispro (ADMELOG) corrective regimen sliding scale   SubCutaneous three times a day before meals  insulin lispro (ADMELOG) corrective regimen sliding scale   SubCutaneous at bedtime  OLANZapine 2.5 milliGRAM(s) Oral once  pantoprazole    Tablet 40 milliGRAM(s) Oral before breakfast  polyethylene glycol 3350 17 Gram(s) Oral at bedtime  sacubitril 24 mG/valsartan 26 mG 1 Tablet(s) Oral two times a day  senna 2 Tablet(s) Oral at bedtime    MEDICATIONS  (PRN):  acetaminophen     Tablet .. 650 milliGRAM(s) Oral every 6 hours PRN Temp greater or equal to 38C (100.4F), Mild Pain (1 - 3)  aluminum hydroxide/magnesium hydroxide/simethicone Suspension 30 milliLiter(s) Oral every 4 hours PRN Dyspepsia  melatonin 3 milliGRAM(s) Oral at bedtime PRN Insomnia  ondansetron Injectable 4 milliGRAM(s) IV Push every 8 hours PRN Nausea and/or Vomiting      I&O's Summary    18 Jun 2025 07:01  -  19 Jun 2025 07:00  --------------------------------------------------------  IN: 80 mL / OUT: 800 mL / NET: -720 mL        PHYSICAL EXAM:  Vital Signs Last 24 Hrs  T(C): 36.6 (19 Jun 2025 04:40), Max: 36.7 (18 Jun 2025 16:32)  T(F): 97.9 (19 Jun 2025 04:40), Max: 98 (18 Jun 2025 16:32)  HR: 98 (19 Jun 2025 05:22) (93 - 98)  BP: 109/66 (19 Jun 2025 04:40) (95/63 - 109/66)  BP(mean): --  RR: 18 (19 Jun 2025 04:40) (18 - 18)  SpO2: 97% (19 Jun 2025 05:22) (95% - 100%)    Parameters below as of 19 Jun 2025 04:40  Patient On (Oxygen Delivery Method): BiPAP/CPAP      PHYSICAL EXAM:  CONSTITUTIONAL: unwell appearing, breathing comfortably on room air  EYES: anicteric   HENT: oropharynx clear and moist, normocephalic  RESPIRATORY: normal respiratory effort; lungs are clear to auscultation bilaterally (no wheezes/crackles)  CARDIOVASCULAR: regular rate and rhythm, normal S1 and S2, no murmur/rub/gallop  ABDOMEN: positive bowel sounds, non-tender, non-distended, no organomegaly   : no Espinal catheter in place, no flank tenderness   MUSCULOSKELETAL:  moving all four extremities to pain   EXTREMITIES: 1+ pitting edema of bilateral lower extremities   NEUROLOGY: awake, alert, oriented to self, place, date, and situation obeys commands       LABS:                        9.4    9.49  )-----------( 277      ( 19 Jun 2025 04:45 )             30.8     06-19    137  |  97  |  74[H]  ----------------------------<  104[H]  4.8   |  29  |  1.48[H]    Ca    10.3      19 Jun 2025 04:46  Phos  3.6     06-17  Mg     2.0     06-19    TPro  6.9  /  Alb  3.0[L]  /  TBili  0.2  /  DBili  x   /  AST  11  /  ALT  9[L]  /  AlkPhos  49  06-17    PT/INR - ( 17 Jun 2025 12:15 )   PT: 12.3 sec;   INR: 1.07 ratio         PTT - ( 17 Jun 2025 12:15 )  PTT:102.6 sec      Urinalysis Basic - ( 19 Jun 2025 04:46 )    Color: x / Appearance: x / SG: x / pH: x  Gluc: 104 mg/dL / Ketone: x  / Bili: x / Urobili: x   Blood: x / Protein: x / Nitrite: x   Leuk Esterase: x / RBC: x / WBC x   Sq Epi: x / Non Sq Epi: x / Bacteria: x

## 2025-06-19 NOTE — PROGRESS NOTE ADULT - ASSESSMENT
69F with PMHx DM, HTN, VERONIKA, sciatica, peripheral neuropathy, seizures initially presenting to [facility] on 6/10/2025 complaining of progressively worsening shortness of breath for 1 month, admitted for acute decompensated heart failure exacerbation. She was receiving lasix IV for diuresis & Rocephin for PNA. Transferred to CCU at outside hospital, and subsequently transferred to [facility] CICU. Patient was on Bumex drip now discontinued with output of 2L since admitted.     Pulmonary consulted due to hypoxemia with pleural effusions. As per HF team, this is HF of unclear cause with Ef of 30-35% with no prior TTE on chart.     #HF on unclear cause  #Bilateral pleural effusions   #VERONIKA with CPAP at home  - Patient with recurrent events of somnolence likely due to hypercarbia that improve with NIV   - s/p Thoracentesis L : follow up pleural studies and post CXR   - Strict I/Os with goal of net negative with dosing as per primary team    - Ischemic work up as per primary team, thoracentesis is not necessarily part of the algorithm for amyloidosis although if significant respiratory distress without improvement with diuresis could offer procedure - at this time on 2L NC   -  No evidence of LV thrombus to require heparin

## 2025-06-19 NOTE — PROGRESS NOTE ADULT - SUBJECTIVE AND OBJECTIVE BOX
Interval Events:      REVIEW OF SYSTEMS:  Negative except as documented above.      OBJECTIVE:  ICU Vital Signs Last 24 Hrs  T(C): 36.6 (19 Jun 2025 16:51), Max: 36.6 (19 Jun 2025 04:40)  T(F): 97.9 (19 Jun 2025 16:51), Max: 97.9 (19 Jun 2025 04:40)  HR: 105 (19 Jun 2025 16:51) (94 - 105)  BP: 106/70 (19 Jun 2025 16:51) (96/60 - 109/66)  BP(mean): --  ABP: --  ABP(mean): --  RR: 17 (19 Jun 2025 16:51) (17 - 18)  SpO2: 95% (19 Jun 2025 16:51) (95% - 100%)    O2 Parameters below as of 19 Jun 2025 16:51  Patient On (Oxygen Delivery Method): nasal cannula  O2 Flow (L/min): 3            06-18 @ 07:01  -  06-19 @ 07:00  --------------------------------------------------------  IN: 80 mL / OUT: 800 mL / NET: -720 mL      CAPILLARY BLOOD GLUCOSE      POCT Blood Glucose.: 122 mg/dL (19 Jun 2025 17:16)      PHYSICAL EXAM:  General: NAD  HEENT:  EOMI, sclera anicteric, moist mucus membranes  Neck: supple  Cardiovascular: RRR  Respiratory: CTAB, no wheezes, crackles, or rhonci  Abdomen: soft, nontender  Extremities: warm and well perfused, no edema, no clubbing  Skin: no rashes  Neurological: no focal deficits    HOSPITAL MEDICATIONS:  MEDICATIONS  (STANDING):  aspirin  chewable 81 milliGRAM(s) Oral daily  atorvastatin 80 milliGRAM(s) Oral at bedtime  bumetanide Injectable 2 milliGRAM(s) IV Push daily  divalproex  milliGRAM(s) Oral two times a day  gabapentin 300 milliGRAM(s) Oral three times a day  hydrALAZINE 50 milliGRAM(s) Oral three times a day  insulin lispro (ADMELOG) corrective regimen sliding scale   SubCutaneous three times a day before meals  insulin lispro (ADMELOG) corrective regimen sliding scale   SubCutaneous at bedtime  OLANZapine 2.5 milliGRAM(s) Oral once  pantoprazole    Tablet 40 milliGRAM(s) Oral before breakfast  polyethylene glycol 3350 17 Gram(s) Oral at bedtime  sacubitril 24 mG/valsartan 26 mG 1 Tablet(s) Oral two times a day  senna 2 Tablet(s) Oral at bedtime    MEDICATIONS  (PRN):  acetaminophen     Tablet .. 650 milliGRAM(s) Oral every 6 hours PRN Temp greater or equal to 38C (100.4F), Mild Pain (1 - 3)  aluminum hydroxide/magnesium hydroxide/simethicone Suspension 30 milliLiter(s) Oral every 4 hours PRN Dyspepsia  melatonin 3 milliGRAM(s) Oral at bedtime PRN Insomnia  ondansetron Injectable 4 milliGRAM(s) IV Push every 8 hours PRN Nausea and/or Vomiting      LABS:                        9.4    9.49  )-----------( 277      ( 19 Jun 2025 04:45 )             30.8     Hgb Trend: 9.4<--, 9.1<--, 9.1<--, 9.5<--, 9.3<--  06-19    137  |  97  |  74[H]  ----------------------------<  104[H]  4.8   |  29  |  1.48[H]    Ca    10.3      19 Jun 2025 04:46  Mg     2.0     06-19      Creatinine Trend: 1.48<--, 1.70<--, 1.78<--, 1.63<--, 1.23<--, 1.20<--    Urinalysis Basic - ( 19 Jun 2025 04:46 )    Color: x / Appearance: x / SG: x / pH: x  Gluc: 104 mg/dL / Ketone: x  / Bili: x / Urobili: x   Blood: x / Protein: x / Nitrite: x   Leuk Esterase: x / RBC: x / WBC x   Sq Epi: x / Non Sq Epi: x / Bacteria: x            MICROBIOLOGY:       RADIOLOGY:  [x] Reviewed and interpreted by me

## 2025-06-19 NOTE — PROGRESS NOTE ADULT - ASSESSMENT
Ms Waters is a 69 yr old F, with PMH of HTN, DM2, VERONIKA, sciatica, peripheral neuropathy, hx of seizure initially presented to [facility] on 6/10/25 with progressively worsening SOB, admitted for ADHF and possible pneumonia. Received IV diuretics (lasix) and antibiotics for pneumonia and was transferred to CCU. Ultimately transferred to [facility] CICU for further management. Diuresed, found with LVEF 30-35% has LBBB    now off IV bumex drip but is SOB on minimal exertion CHF meds being optimized, still elevate creatinine, on Bumex 2 mg IVP as per CHF    post thoracentesis remains lethargic. Of note, patient is on depakote    advise    1 please obtain ABG, lactate  2. continue CHF meds  3. ? reduce dose of depakote ?

## 2025-06-19 NOTE — PROGRESS NOTE ADULT - PROBLEM SELECTOR PLAN 1
Patient presents with new onset oxygen needs. Likely due to acute decompensated heart failure.   - Treat heart failure below   - Wean oxygen as tolerated, goal SpO2>92%  - Stop DuoNeb as no clear indication  Altered mental status due to Acute Metabolic Encephalopathy from Acute Hypoxic Hypercarbic Respiratory Failure  RRT 6/17 -CT head negative  EEG with nonspecific slowing but no sign of seizure  Neurology consulted, recommendations appreciated  blood gas showed PCO2 of 57 on 6/17  wore Bipap for additional 90 minutes 6/17 and mental status improved to baseline  RRT yesterday for hypercarbia - improved after BiPAP  Pulm deferred thoracentesis, f/u today re BiPAP

## 2025-06-19 NOTE — PROGRESS NOTE ADULT - PROBLEM SELECTOR PLAN 2
On 6/14/25, TTE with EF 30-35% with wall motion abnormalities. Diuresed with Bumex drip earlier in hospitalization.   - Treatment with diuresis per heart failure, follow up recommendations  - Pulmonology consult on 6/15/25 (called) for possible thoracentesis, follow up recommendations  - Isordil 10 mg TID   - Hydralazine 50 mg TID   - Heparin drip discontinued - no LV thrombus on repeat TTE  pending cMRI, will need Zyprexa before  Will eventually need right and LHC

## 2025-06-19 NOTE — PROGRESS NOTE ADULT - PROBLEM SELECTOR PLAN 3
MIKAELA on CKD3b    Cr increasing due to diuresis  will continue to monitor and wean Bumex after thoracentesis

## 2025-06-19 NOTE — PROGRESS NOTE ADULT - SUBJECTIVE AND OBJECTIVE BOX
INTERVAL HPI/OVERNIGHT EVENTS: patient remains intermittently somulent, s/p thoracentesis    MEDICATIONS  (STANDING):  aspirin  chewable 81 milliGRAM(s) Oral daily  atorvastatin 80 milliGRAM(s) Oral at bedtime  bumetanide Injectable 2 milliGRAM(s) IV Push daily  divalproex  milliGRAM(s) Oral two times a day  gabapentin 300 milliGRAM(s) Oral three times a day  hydrALAZINE 50 milliGRAM(s) Oral three times a day  insulin lispro (ADMELOG) corrective regimen sliding scale   SubCutaneous three times a day before meals  insulin lispro (ADMELOG) corrective regimen sliding scale   SubCutaneous at bedtime  OLANZapine 2.5 milliGRAM(s) Oral once  pantoprazole    Tablet 40 milliGRAM(s) Oral before breakfast  polyethylene glycol 3350 17 Gram(s) Oral at bedtime  sacubitril 24 mG/valsartan 26 mG 1 Tablet(s) Oral two times a day  senna 2 Tablet(s) Oral at bedtime    MEDICATIONS  (PRN):  acetaminophen     Tablet .. 650 milliGRAM(s) Oral every 6 hours PRN Temp greater or equal to 38C (100.4F), Mild Pain (1 - 3)  aluminum hydroxide/magnesium hydroxide/simethicone Suspension 30 milliLiter(s) Oral every 4 hours PRN Dyspepsia  melatonin 3 milliGRAM(s) Oral at bedtime PRN Insomnia  ondansetron Injectable 4 milliGRAM(s) IV Push every 8 hours PRN Nausea and/or Vomiting      Allergies    shellfish (Other; Short breath)  No Known Drug Allergies    Intolerances    lactose (Unknown)    ROS:  General: Pt denies recent weight loss/fever/chills    Neurological: denies numbness or  sensation loss    Cardiovascular: denies chest pain/palpitations/leg edema    Respiratory and Thorax: denies SOB/cough/wheezing    Gastrointestinal: denies abdominal pain/diarrhea/constipation/bloody stool    Genitourinary: denies urinary frequency/urgency/ dysuria    Musculoskeletal: denies joint pain or swelling, denies restricted motion    Hematologic: denies abnormal bleeding  	    	  	    		        	    	            Vital Signs Last 24 Hrs  T(C): 36.6 (19 Jun 2025 16:51), Max: 36.6 (19 Jun 2025 04:40)  T(F): 97.9 (19 Jun 2025 16:51), Max: 97.9 (19 Jun 2025 04:40)  HR: 105 (19 Jun 2025 16:51) (94 - 105)  BP: 106/70 (19 Jun 2025 16:51) (96/60 - 109/66)  BP(mean): --  RR: 17 (19 Jun 2025 16:51) (17 - 18)  SpO2: 95% (19 Jun 2025 16:51) (95% - 100%)    Parameters below as of 19 Jun 2025 16:51  Patient On (Oxygen Delivery Method): nasal cannula  O2 Flow (L/min): 3    Daily     Daily     06-18 @ 07:01  -  06-19 @ 07:00  --------------------------------------------------------  IN: 80 mL / OUT: 800 mL / NET: -720 mL      Physical Exam:    wdwn female  no JVD  cor RRR  lung decreased BS at bases   abd soft   ext  mild edema      LABS:                        9.4    9.49  )-----------( 277      ( 19 Jun 2025 04:45 )             30.8     06-19    137  |  97  |  74[H]  ----------------------------<  104[H]  4.8   |  29  |  1.48[H]    Ca    10.3      19 Jun 2025 04:46  Mg     2.0     06-19        Urinalysis Basic - ( 19 Jun 2025 04:46 )    Color: x / Appearance: x / SG: x / pH: x  Gluc: 104 mg/dL / Ketone: x  / Bili: x / Urobili: x   Blood: x / Protein: x / Nitrite: x   Leuk Esterase: x / RBC: x / WBC x   Sq Epi: x / Non Sq Epi: x / Bacteria: x        RADIOLOGY & ADDITIONAL TESTS:    TELE:    EKG:

## 2025-06-20 LAB
ANION GAP SERPL CALC-SCNC: 14 MMOL/L — SIGNIFICANT CHANGE UP (ref 5–17)
BASE EXCESS BLDA CALC-SCNC: 5.3 MMOL/L — HIGH (ref -2–3)
BUN SERPL-MCNC: 76 MG/DL — HIGH (ref 7–23)
CALCIUM SERPL-MCNC: 9.9 MG/DL — SIGNIFICANT CHANGE UP (ref 8.4–10.5)
CHLORIDE SERPL-SCNC: 94 MMOL/L — LOW (ref 96–108)
CO2 BLDA-SCNC: 34 MMOL/L — HIGH (ref 19–24)
CO2 SERPL-SCNC: 26 MMOL/L — SIGNIFICANT CHANGE UP (ref 22–31)
CREAT SERPL-MCNC: 1.54 MG/DL — HIGH (ref 0.5–1.3)
EGFR: 36 ML/MIN/1.73M2 — LOW
EGFR: 36 ML/MIN/1.73M2 — LOW
GAS PNL BLDA: SIGNIFICANT CHANGE UP
GAS PNL BLDV: SIGNIFICANT CHANGE UP
GLUCOSE BLDC GLUCOMTR-MCNC: 110 MG/DL — HIGH (ref 70–99)
GLUCOSE BLDC GLUCOMTR-MCNC: 180 MG/DL — HIGH (ref 70–99)
GLUCOSE BLDC GLUCOMTR-MCNC: 202 MG/DL — HIGH (ref 70–99)
GLUCOSE BLDC GLUCOMTR-MCNC: 218 MG/DL — HIGH (ref 70–99)
GLUCOSE BLDC GLUCOMTR-MCNC: 249 MG/DL — HIGH (ref 70–99)
GLUCOSE SERPL-MCNC: 100 MG/DL — HIGH (ref 70–99)
HCO3 BLDA-SCNC: 32 MMOL/L — HIGH (ref 21–28)
HCT VFR BLD CALC: 29.9 % — LOW (ref 34.5–45)
HGB BLD-MCNC: 9.3 G/DL — LOW (ref 11.5–15.5)
HOROWITZ INDEX BLDA+IHG-RTO: 28 — SIGNIFICANT CHANGE UP
MCHC RBC-ENTMCNC: 28 PG — SIGNIFICANT CHANGE UP (ref 27–34)
MCHC RBC-ENTMCNC: 31.1 G/DL — LOW (ref 32–36)
MCV RBC AUTO: 90.1 FL — SIGNIFICANT CHANGE UP (ref 80–100)
NRBC # BLD AUTO: 0 K/UL — SIGNIFICANT CHANGE UP (ref 0–0)
NRBC # FLD: 0 K/UL — SIGNIFICANT CHANGE UP (ref 0–0)
NRBC BLD AUTO-RTO: 0 /100 WBCS — SIGNIFICANT CHANGE UP (ref 0–0)
PCO2 BLDA: 54 MMHG — HIGH (ref 32–45)
PH BLDA: 7.38 — SIGNIFICANT CHANGE UP (ref 7.35–7.45)
PLATELET # BLD AUTO: 283 K/UL — SIGNIFICANT CHANGE UP (ref 150–400)
PMV BLD: 11.7 FL — SIGNIFICANT CHANGE UP (ref 7–13)
PO2 BLDA: 134 MMHG — HIGH (ref 83–108)
POTASSIUM SERPL-MCNC: 4.8 MMOL/L — SIGNIFICANT CHANGE UP (ref 3.5–5.3)
POTASSIUM SERPL-SCNC: 4.8 MMOL/L — SIGNIFICANT CHANGE UP (ref 3.5–5.3)
RBC # BLD: 3.32 M/UL — LOW (ref 3.8–5.2)
RBC # FLD: 16.8 % — HIGH (ref 10.3–14.5)
SAO2 % BLDA: 99.4 % — HIGH (ref 94–98)
SODIUM SERPL-SCNC: 134 MMOL/L — LOW (ref 135–145)
VALPROATE SERPL-MCNC: 53 UG/ML — SIGNIFICANT CHANGE UP (ref 50–100)
WBC # BLD: 10.82 K/UL — HIGH (ref 3.8–10.5)
WBC # FLD AUTO: 10.82 K/UL — HIGH (ref 3.8–10.5)

## 2025-06-20 PROCEDURE — 99233 SBSQ HOSP IP/OBS HIGH 50: CPT | Mod: GC

## 2025-06-20 PROCEDURE — 99233 SBSQ HOSP IP/OBS HIGH 50: CPT

## 2025-06-20 PROCEDURE — 71045 X-RAY EXAM CHEST 1 VIEW: CPT | Mod: 26

## 2025-06-20 PROCEDURE — 95813 EEG EXTND MNTR 61-119 MIN: CPT | Mod: 26

## 2025-06-20 PROCEDURE — 99232 SBSQ HOSP IP/OBS MODERATE 35: CPT | Mod: GC

## 2025-06-20 RX ORDER — SACUBITRIL AND VALSARTAN 6; 6 MG/1; MG/1
1 PELLET ORAL
Refills: 0 | Status: DISCONTINUED | OUTPATIENT
Start: 2025-06-20 | End: 2025-06-23

## 2025-06-20 RX ORDER — GABAPENTIN 400 MG/1
200 CAPSULE ORAL THREE TIMES A DAY
Refills: 0 | Status: DISCONTINUED | OUTPATIENT
Start: 2025-06-20 | End: 2025-06-25

## 2025-06-20 RX ADMIN — GABAPENTIN 300 MILLIGRAM(S): 400 CAPSULE ORAL at 09:29

## 2025-06-20 RX ADMIN — GABAPENTIN 200 MILLIGRAM(S): 400 CAPSULE ORAL at 21:14

## 2025-06-20 RX ADMIN — Medication 650 MILLIGRAM(S): at 23:38

## 2025-06-20 RX ADMIN — Medication 40 MILLIGRAM(S): at 09:29

## 2025-06-20 RX ADMIN — INSULIN LISPRO 2: 100 INJECTION, SOLUTION INTRAVENOUS; SUBCUTANEOUS at 18:26

## 2025-06-20 RX ADMIN — ATORVASTATIN CALCIUM 80 MILLIGRAM(S): 80 TABLET, FILM COATED ORAL at 21:14

## 2025-06-20 RX ADMIN — BUMETANIDE 2 MILLIGRAM(S): 1 TABLET ORAL at 09:29

## 2025-06-20 RX ADMIN — INSULIN LISPRO 2: 100 INJECTION, SOLUTION INTRAVENOUS; SUBCUTANEOUS at 11:58

## 2025-06-20 RX ADMIN — Medication 500 MILLIGRAM(S): at 18:26

## 2025-06-20 RX ADMIN — Medication 81 MILLIGRAM(S): at 18:26

## 2025-06-20 RX ADMIN — Medication 650 MILLIGRAM(S): at 23:08

## 2025-06-20 RX ADMIN — Medication 50 MILLIGRAM(S): at 09:29

## 2025-06-20 RX ADMIN — SACUBITRIL AND VALSARTAN 1 TABLET(S): 6; 6 PELLET ORAL at 09:29

## 2025-06-20 RX ADMIN — Medication 500 MILLIGRAM(S): at 09:29

## 2025-06-20 NOTE — PROGRESS NOTE ADULT - PROBLEM SELECTOR PLAN 1
Patient presents with new onset oxygen needs. Likely due to acute decompensated heart failure.   - Treat heart failure below   - Wean oxygen as tolerated, goal SpO2>92%  - Stop DuoNeb as no clear indication  Altered mental status due to Acute Metabolic Encephalopathy from Acute Hypoxic Hypercarbic Respiratory Failure  RRT 6/17 -CT head negative  EEG with nonspecific slowing but no sign of seizure  Neurology saw - will follow up regarding possible sedative effects of Depakote  blood gas showed PCO2 of 57 on 6/17  wore Bipap for additional 90 minutes 6/17 and mental status improved to baseline  RRT yesterday for hypercarbia - improved after BiPAP  s/p thoracentesis, f/u with Pulm today re BiPAP, obtain CT Chest

## 2025-06-20 NOTE — RAPID RESPONSE TEAM SUMMARY - NSSITUATIONBACKGROUNDRRT_GEN_ALL_CORE
69F with PMH of HTN, DM2, VREONIKA, sciatica, peripheral neuropathy, and history of seizures initially presented to [facility] on 6/10/25 for progressively worsening shortness of breath. She was admitted for acute decompensated heart failure (ADHF) and possible pneumonia. She was treated with IV Lasix and antibiotics, then transferred to CCU. For further management, she was transferred to [facility] CICU where she was placed on BiPAP and started on IV diuresis for respiratory distress and significant pulmonary edema. Since then, she has improved, downgraded to telemetry, and is currently stable on 2L nasal cannula    RRT Called for AMS; prior RRTs called for AMS in the setting of hypercapnia. Patient is arousable, adamantly refusing bloodwork; similar to prior episodes. Was on BiPAP overnight, took it off in the morning and was conversant. Ideally would collect gas to confirm CO2 Narcosis, but patient arousable and swings arms around; refusing blood draw. Given similarity to prior epsiodes; risks of forcing blood draw; likely outweight empirically trialing BiPAP. Primary team to discuss with neurology regarding lowering Neuropathic medications to reduce sedation 69F with PMH of HTN, DM2, VERONIKA, sciatica, peripheral neuropathy, and history of seizures initially presented to [facility] on 6/10/25 for progressively worsening shortness of breath. She was admitted for acute decompensated heart failure (ADHF) and possible pneumonia. She was treated with IV Lasix and antibiotics, then transferred to CCU. For further management, she was transferred to [facility] CICU where she was placed on BiPAP and started on IV diuresis for respiratory distress and significant pulmonary edema. Since then, she has improved, downgraded to telemetry, and is currently stable on 2L nasal cannula    RRT Called for AMS; prior RRTs called for AMS in the setting of hypercapnia. Patient is arousable, adamantly refusing bloodwork; similar to prior episodes. Was on BiPAP overnight, took it off in the morning and was conversant. Ideally would collect gas to confirm CO2 Narcosis, but patient arousable and swings arms around; refusing blood draw. Given similarity to prior epsiodes; risks of forcing blood draw; likely outweight empirically trialing BiPAP. Primary team to discuss with neurology regarding lowering Neuropathic medications to reduce sedation    Otherwise Vital Signs are stable; RRT Ended

## 2025-06-20 NOTE — PROGRESS NOTE ADULT - PROBLEM SELECTOR PLAN 1
- Etiology: unclear. Pt states she has NO prior h/o cardiomyopathy. She has +WMA on TTE, abnormal EKG and risk fcators for CAD. She also had a recent CT chest with mediastinal lymphadenopathy concerning for sarcoid.   - TTE outside hospital show LVEF 20% but TTE 6/14 here with LVEF 30-35%.  - Please dc heparin gtt, no e/o LV thrombus on repeat TTE     - Will obtain records from [facility]     - Will need eventual LHC/RHC when more euvolemic & stable renal function. Can try for today vs Mon     - Amyloid w/u initiated (free light chains & immunofixation)     - Would order cardiac MRI to eval for infiltraive processes  - GDMT:     - ARNI: Incr Entresto 49-51 mg BID.     - BB: Can start Toprol 25 daily on Sun if stable  - Afterload reduction: stop Hydral  - Diuretics: stop Bumex      - Strict I/Os and daily weights     - Appreciate pulm recs, s/p thora 6/19  - K > 4 & Mg > 2 - Etiology: unclear. Pt states she has NO prior h/o cardiomyopathy. She has +WMA on TTE, abnormal EKG and risk fcators for CAD. She also had a recent CT chest with mediastinal lymphadenopathy concerning for sarcoid.   - TTE outside hospital show LVEF 20% but TTE 6/14 here with LVEF 30-35%.  - Please dc heparin gtt, no e/o LV thrombus on repeat TTE     - Will obtain records from [facility]     - Will need eventual LHC/RHC when more euvolemic & stable renal function. Can try for today vs Mon     - Amyloid w/u initiated (free light chains & immunofixation) - elevated lambda/clair, normal ratio, no monoclonal bands.      - Would order cardiac MRI to eval for infiltraive processes  - GDMT:     - ARNI: Incr Entresto 49-51 mg BID.     - BB: Can start Toprol 25 daily on Sun if stable     - Plan to add MRA on Mondya and SGLT2i once all procedures done  - Afterload reduction: stop Hydral  - Diuretics: stop Bumex      - Strict I/Os and daily weights     - Appreciate pulm recs, s/p thora 6/19  - K > 4 & Mg > 2

## 2025-06-20 NOTE — PROGRESS NOTE ADULT - SUBJECTIVE AND OBJECTIVE BOX
[facility] Division of Hospital Medicine  Otilia Finn MD  MS Teams PREFERRED        SUBJECTIVE / OVERNIGHT EVENTS: Seen at bedside today. RRT again for lethargy. Pt woke up and declined ABG.    MEDICATIONS  (STANDING):  aspirin  chewable 81 milliGRAM(s) Oral daily  atorvastatin 80 milliGRAM(s) Oral at bedtime  divalproex  milliGRAM(s) Oral two times a day  gabapentin 300 milliGRAM(s) Oral three times a day  insulin lispro (ADMELOG) corrective regimen sliding scale   SubCutaneous at bedtime  insulin lispro (ADMELOG) corrective regimen sliding scale   SubCutaneous three times a day before meals  OLANZapine 2.5 milliGRAM(s) Oral once  pantoprazole    Tablet 40 milliGRAM(s) Oral before breakfast  polyethylene glycol 3350 17 Gram(s) Oral at bedtime  sacubitril 49 mG/valsartan 51 mG 1 Tablet(s) Oral two times a day  senna 2 Tablet(s) Oral at bedtime    MEDICATIONS  (PRN):  acetaminophen     Tablet .. 650 milliGRAM(s) Oral every 6 hours PRN Temp greater or equal to 38C (100.4F), Mild Pain (1 - 3)  aluminum hydroxide/magnesium hydroxide/simethicone Suspension 30 milliLiter(s) Oral every 4 hours PRN Dyspepsia  melatonin 3 milliGRAM(s) Oral at bedtime PRN Insomnia  ondansetron Injectable 4 milliGRAM(s) IV Push every 8 hours PRN Nausea and/or Vomiting      I&O's Summary      PHYSICAL EXAM:  Vital Signs Last 24 Hrs  T(C): 37 (20 Jun 2025 10:54), Max: 37 (20 Jun 2025 10:54)  T(F): 98.6 (20 Jun 2025 10:54), Max: 98.6 (20 Jun 2025 10:54)  HR: 97 (20 Jun 2025 11:26) (97 - 105)  BP: 100/52 (20 Jun 2025 10:54) (100/52 - 109/61)  BP(mean): --  RR: 18 (20 Jun 2025 10:54) (17 - 18)  SpO2: 100% (20 Jun 2025 11:26) (95% - 100%)    Parameters below as of 20 Jun 2025 10:54  Patient On (Oxygen Delivery Method): nasal cannula  O2 Flow (L/min): 2  PHYSICAL EXAM:  CONSTITUTIONAL: unwell appearing, breathing comfortably on room air  EYES: anicteric   HENT: oropharynx clear and moist, normocephalic  RESPIRATORY: normal respiratory effort; lungs are clear to auscultation bilaterally (no wheezes/crackles)  CARDIOVASCULAR: regular rate and rhythm, normal S1 and S2, no murmur/rub/gallop  ABDOMEN: positive bowel sounds, non-tender, non-distended, no organomegaly   : no Espinal catheter in place, no flank tenderness   MUSCULOSKELETAL:  moving all four extremities to pain   EXTREMITIES: 1+ pitting edema of bilateral lower extremities   NEUROLOGY: awake, alert, oriented to self, place, date, and situation obeys commands       LABS:                        9.3    10.82 )-----------( 283      ( 20 Jun 2025 06:30 )             29.9     06-20    134[L]  |  94[L]  |  76[H]  ----------------------------<  100[H]  4.8   |  26  |  1.54[H]    Ca    9.9      20 Jun 2025 06:30  Mg     1.9     06-19            Urinalysis Basic - ( 20 Jun 2025 06:30 )    Color: x / Appearance: x / SG: x / pH: x  Gluc: 100 mg/dL / Ketone: x  / Bili: x / Urobili: x   Blood: x / Protein: x / Nitrite: x   Leuk Esterase: x / RBC: x / WBC x   Sq Epi: x / Non Sq Epi: x / Bacteria: x        Culture - Fungal, Body Fluid (collected 19 Jun 2025 14:32)  Source: Pleural Fl Pleural Fluid  Preliminary Report (20 Jun 2025 10:59):    Testing in progress    Culture - Body Fluid with Gram Stain (collected 19 Jun 2025 14:32)  Source: Pleural Fl Pleural Fluid  Gram Stain (19 Jun 2025 22:39):    polymorphonuclear leukocytes seen by cytocentrifuge    No organisms seen by cytocentrifuge

## 2025-06-20 NOTE — PROGRESS NOTE ADULT - ATTENDING COMMENTS
68 yo F with PMHx DM, HTN, VERONIKA, sciatica, peripheral neuropathy, seizures initially presenting to [facility] on 6/10/2025 complaining of progressively worsening shortness of breath for 1 month, admitted for acute decompensated heart failure exacerbation. She was receiving lasix IV for diuresis & Rocephin for PNA. Transferred to CCU at outside hospital, and subsequently transferred to [facility] CICU. Patient was on Bumex drip now on Bumex IV BID. Pulmonary consulted due to hypoxemia with pleural effusions. As per HF team, this is HF of unclear cause with EF of 30-35% w/ dyskinetic apex and with no prior TTE on chart. EKG with LBBB. Unaware of any ischemic evaluation thus far, alternative ddx under consideration is amyloidosis.  Cr rising.  Remains on 1-2L NC. Waxing/waning mental status of unclear etiology.    s/p LEFT thoracentesis 6/19 with removal of 600cc    #HF, etiology unknown  #Bilateral pleural effusions  #Volume overload  #Hypercapneic/Hypoxic resp failure  #MIKAELA  #Encephalopathy    Recommend:   - pleural fluid c/w exudate and lymph predominant, await cytology  - chest ct for further evaluation  - ordered for brain and cardiac MRI to evaluate for AMS and amyloidosis, respectively, however unable to perform d/t anxiety  - continue BIPAP overnight and when napping given persistent hypercapnea  - Neurology following for AMS  - incentive spirometry  - PT eval, OOB to chair as tolerated

## 2025-06-20 NOTE — PROGRESS NOTE ADULT - ASSESSMENT
69F with PMH of HTN, DM2, VERONIKA, sciatica, peripheral neuropathy, hx of seizure initially presented to [facility] on 6/10/25 with progressively worsening SOB, admitted for ADHF and possible pneumonia. Received IV diuretics (lasix) and antibiotics for pneumonia and was transferred to CCU. Ultimately transferred to [facility] CICU for further management. On arrival, she was on BIPAP & started on IV diuresis for respiratory distress & significant pulmonary edema. She has since been downgraded to telemetry & is on 2L NC.    Cardiac Studies:  - TTE 6/14/25: LVEF 30-35%, nml RV, TAPSE 2.0cm, nml LA/RA, no AR, no MR, trace TR, IVC nml 1.4cm  - TTE OSH: Reported LVEF 20% - need official records 69F with PMH of HTN, DM2, VERONIKA, sciatica, peripheral neuropathy, hx of seizure initially presented to [facility] on 6/10/25 with progressively worsening SOB, admitted for ADHF and possible pneumonia. Received IV diuretics (lasix) and antibiotics for pneumonia and was transferred to CCU. Ultimately transferred to [facility] CICU for further management. On arrival, she was on BIPAP & started on IV diuresis for respiratory distress & significant pulmonary edema. She has since been downgraded to telemetry & is on 2L NC. She has responded well to diuresis and GDMT. S/p thoracentesis 6/19. Her IVC today is small and collapsible. She is pending L/RHC for newly dx'ed CMP and cMRI to rule out infiltrative disease (OSH chest CT with significant mediastinal LAD-?sarcoid).   Medicine and neurology to work up transient episodes of AMS.     Cardiac Studies:  - TTE 6/14/25: LVEF 30-35%, nml RV, TAPSE 2.0cm, nml LA/RA, no AR, no MR, trace TR, IVC nml 1.4cm  - TTE OSH: Reported LVEF 20% - need official records

## 2025-06-20 NOTE — PROGRESS NOTE ADULT - ATTENDING COMMENTS
During my evaluation, patient's nurse and neice  were present and I appreciate their involvement and support.  I agree with above exam, assessment and plan unless noted below,    Ms. Waters is a 69 year old right handed woman with PMH of vascular risk factors, VERONIKA on BiPAP, neuropathy, hx of seizures on Depakote, who was admitted for acute exacerbation of HF and p/w an episode of lethargy and decreased responsiveness which has now gradually resolved after a 30-40 minutes after BIPAP use.  Etiology of AMS is uncertain but perhaps due to the metabolic encephalopathy. During my evaluation, patient is back to baseline and talking to family via phone. But she has recurrent episode of decreased responsiveness and RRT. Therefore, will get VEEG to capture the events. Although clinically low suspicious for seizure.     Routine EEG did not show any seizure or epileptiform potentials.   MRI brain to my eye did not show any acute pathology.     VEEG due to the recurrent episode of decreased responsiveness and RRT.   Continue Depakote  Please check Vit-D level   Continue Vit D and calcium supplements.   Continue medical management, neuro- check and fall precaution.  GI and DVT prophylaxis.  I discussed the diagnosis, treatment plan and prognosis with the patient.  All questions and concerns were addressed. The patient demonstrated good understanding of the treatment plan.  My cumulative time spent on the care of this patient was 55 minutes.  If you have any further questions, please do not hesitate to contact our team.  Thank you for allowing us to participate in this patient care.

## 2025-06-20 NOTE — PROGRESS NOTE ADULT - ASSESSMENT
New cardiomyopath y, CHF , LBBB, mediastinal lyymphadenopathy, , lethargy seizure     advise    1 bumex on hoold asper CHF service  2. cardiac MI: Heart failure with reduced ejection fraction.   ·3 .increase entresto as per CHF service  .4 , lethargy ? decrease valproic acid?>  5 R and L heart cath  6. eventual CRTD  if remains in CHF   -

## 2025-06-20 NOTE — PROGRESS NOTE ADULT - SUBJECTIVE AND OBJECTIVE BOX
Interval Events:      REVIEW OF SYSTEMS:  Negative except as documented above.      OBJECTIVE:  ICU Vital Signs Last 24 Hrs  T(C): 36.6 (20 Jun 2025 04:32), Max: 36.6 (19 Jun 2025 16:51)  T(F): 97.9 (20 Jun 2025 04:32), Max: 97.9 (19 Jun 2025 16:51)  HR: 97 (20 Jun 2025 05:55) (97 - 105)  BP: 109/61 (20 Jun 2025 04:32) (96/60 - 109/61)  BP(mean): --  ABP: --  ABP(mean): --  RR: 18 (20 Jun 2025 04:32) (17 - 18)  SpO2: 96% (20 Jun 2025 05:55) (95% - 100%)    O2 Parameters below as of 20 Jun 2025 04:32  Patient On (Oxygen Delivery Method): BiPAP/CPAP              CAPILLARY BLOOD GLUCOSE      POCT Blood Glucose.: 110 mg/dL (20 Jun 2025 07:29)      PHYSICAL EXAM:  General: NAD  HEENT:  EOMI, sclera anicteric, moist mucus membranes  Neck: supple  Cardiovascular: RRR  Respiratory: CTAB, no wheezes, crackles, or rhonci  Abdomen: soft, nontender  Extremities: warm and well perfused, no edema, no clubbing  Skin: no rashes  Neurological: no focal deficits    HOSPITAL MEDICATIONS:  MEDICATIONS  (STANDING):  aspirin  chewable 81 milliGRAM(s) Oral daily  atorvastatin 80 milliGRAM(s) Oral at bedtime  bumetanide Injectable 2 milliGRAM(s) IV Push daily  divalproex  milliGRAM(s) Oral two times a day  gabapentin 300 milliGRAM(s) Oral three times a day  hydrALAZINE 50 milliGRAM(s) Oral three times a day  insulin lispro (ADMELOG) corrective regimen sliding scale   SubCutaneous at bedtime  insulin lispro (ADMELOG) corrective regimen sliding scale   SubCutaneous three times a day before meals  OLANZapine 2.5 milliGRAM(s) Oral once  pantoprazole    Tablet 40 milliGRAM(s) Oral before breakfast  polyethylene glycol 3350 17 Gram(s) Oral at bedtime  sacubitril 24 mG/valsartan 26 mG 1 Tablet(s) Oral two times a day  senna 2 Tablet(s) Oral at bedtime    MEDICATIONS  (PRN):  acetaminophen     Tablet .. 650 milliGRAM(s) Oral every 6 hours PRN Temp greater or equal to 38C (100.4F), Mild Pain (1 - 3)  aluminum hydroxide/magnesium hydroxide/simethicone Suspension 30 milliLiter(s) Oral every 4 hours PRN Dyspepsia  melatonin 3 milliGRAM(s) Oral at bedtime PRN Insomnia  ondansetron Injectable 4 milliGRAM(s) IV Push every 8 hours PRN Nausea and/or Vomiting      LABS:                        9.3    10.82 )-----------( 283      ( 20 Jun 2025 06:30 )             29.9     Hgb Trend: 9.3<--, 9.4<--, 9.1<--, 9.1<--, 9.5<--  06-20    134[L]  |  94[L]  |  76[H]  ----------------------------<  100[H]  4.8   |  26  |  1.54[H]    Ca    9.9      20 Jun 2025 06:30  Mg     1.9     06-19      Creatinine Trend: 1.54<--, 1.48<--, 1.48<--, 1.70<--, 1.78<--, 1.63<--    Urinalysis Basic - ( 20 Jun 2025 06:30 )    Color: x / Appearance: x / SG: x / pH: x  Gluc: 100 mg/dL / Ketone: x  / Bili: x / Urobili: x   Blood: x / Protein: x / Nitrite: x   Leuk Esterase: x / RBC: x / WBC x   Sq Epi: x / Non Sq Epi: x / Bacteria: x      Arterial Blood Gas:  06-19 @ 18:35  7.44/41/125/28/99.2/3.3  ABG lactate: --        MICROBIOLOGY:     Culture - Body Fluid with Gram Stain (collected 19 Jun 2025 14:32)  Source: Pleural Fl Pleural Fluid  Gram Stain (19 Jun 2025 22:39):    polymorphonuclear leukocytes seen by cytocentrifuge    No organisms seen by cytocentrifuge        RADIOLOGY:  [x] Reviewed and interpreted by me

## 2025-06-20 NOTE — PROGRESS NOTE ADULT - ASSESSMENT
69F with PMHx DM, HTN, VERONIKA, sciatica, peripheral neuropathy, seizures initially presenting to [facility] on 6/10/2025 complaining of progressively worsening shortness of breath for 1 month, admitted for acute decompensated heart failure exacerbation. She was receiving lasix IV for diuresis & Rocephin for PNA. Transferred to CCU at outside hospital, and subsequently transferred to [facility] CICU. Patient was on Bumex drip now discontinued with output of 2L since admitted.     Pulmonary consulted due to hypoxemia with pleural effusions. As per HF team, this is HF of unclear cause with Ef of 30-35% with no prior TTE on chart.     #HF on unclear cause  #Bilateral pleural effusions: L s/p thora 6/19 with exudate lymphocytic as per serum-pleural albumin and protein gradients (used on pts on diuresis to which Albumin gradient was 0.7 and Protein 2.4 (cut offs of more than 1.2 and 2.5 respectively for Albumin and Protein)   #VERONIKA with CPAP at home  - Patient with recurrent events of somnolence likely due to hypercarbia that improve with NIV   - s/p Thoracentesis L : follow up pleural cytology and flow   - CXR with L retrocardiac round opacity? please obtain CT Chest   - Strict I/Os with goal of net negative with dosing as per primary team    - Ischemic work up as per primary team, thoracentesis is not necessarily part of the algorithm for amyloidosis although if significant respiratory distress without improvement with diuresis could offer procedure - at this time on 2L NC   -  No evidence of LV thrombus to require heparin

## 2025-06-20 NOTE — PROGRESS NOTE ADULT - SUBJECTIVE AND OBJECTIVE BOX
Patient is a 69y old  Female who presents with a chief complaint of Acute decompensated heart failure (20 Jun 2025 12:09)    HPI:  69F with PMHx DM, HTN, VERONIKA, sciatica, peripheral neuropathy, seizures initially presenting to [facility] on 6/10/2025 complaining of progressively worsening shortness of breath x1 month, admitted for acute decompensated heart failure exacerbation and possible pneumonia. She was receiving lasix IV for diuresis & Rocephin for PNA. Transferred to CCU at outside hospital, and subsequently transferred to [facility] CICU.     Upon arrival to [facility] CICU, patient on bipap, appears comfortable, hemodynamically stable.  (14 Jun 2025 01:27)      Interval history: Evaluated at bedside. Patient awake and alert but with speech latency, on NC oxygen. RRT today for fluctuating mentation        Vital Signs Last 24 Hrs  T(C): 37 (20 Jun 2025 10:54), Max: 37 (20 Jun 2025 10:54)  T(F): 98.6 (20 Jun 2025 10:54), Max: 98.6 (20 Jun 2025 10:54)  HR: 98 (20 Jun 2025 14:25) (97 - 105)  BP: 100/52 (20 Jun 2025 10:54) (100/52 - 109/61)  BP(mean): --  RR: 18 (20 Jun 2025 10:54) (17 - 18)  SpO2: 94% (20 Jun 2025 14:25) (94% - 100%)    Parameters below as of 20 Jun 2025 10:54  Patient On (Oxygen Delivery Method): nasal cannula  O2 Flow (L/min): 2    Physical Exam:  Constitutional: NAD, lying in bed  Neuro  General - NAD, pleasant, cooperative   Neurologic Exam:  Mental status - Awake, Alert, Oriented to person, place, and time ( everything but date and day of week). Speech fluent but bradyphonic, repetition and naming intact. Follows simple commands. Fund of knowledge- said Kulwant was president    Cranial nerves:  CN II: Visual fields are full to confrontation.  Pupils are equal and reactive to light.  CN III, IV, VI: EOMI, no nystagmus, no ptosis  CN V: Facial sensation is intact to touch in all 3 divisions bilaterally.  CN VII: Face is symmetric with normal eye closure and smile.  CN VII: Hearing is normal to rubbing fingers  CN IX, X: Phonation is normal.  CN XI: Head turning and shoulder shrug are intact  CN XII: Tongue is midline with normal movements and no atrophy.    Motor - Normal bulk and tone throughout. No pronator drift of out-stretched arms.  Strength testing limited by R. shoulder pain and effort-limited exam.            Deltoid(C5)  Biceps(C6)    Triceps(C7)     Wrist Extension    Wrist Flexion (C8)       R           4-                 4                        4-                    4                              4                    5  L            5                 5                        5                     5                              5                     5    Raised LE b/l against gravity (lifted L>R)    Sensation - Light touch intact throughout    DTR's -             Biceps      Triceps     Brachioradialis      Patellar    Ankle    Toes/plantar response  R             2+             2+                  2+                      Deferred d/t pt comfort  L              2+             2+                 2+                       Deferred d/t pt comfort                    Coordination -  Fine finger movements are intact. There is mild dysmetria on finger-to-nose although slight hand tremor.    LABS:                        9.3    10.82 )-----------( 283      ( 20 Jun 2025 06:30 )             29.9     06-20    134[L]  |  94[L]  |  76[H]  ----------------------------<  100[H]  4.8   |  26  |  1.54[H]    Ca    9.9      20 Jun 2025 06:30  Mg     1.9     06-19        Urinalysis Basic - ( 20 Jun 2025 06:30 )    Color: x / Appearance: x / SG: x / pH: x  Gluc: 100 mg/dL / Ketone: x  / Bili: x / Urobili: x   Blood: x / Protein: x / Nitrite: x   Leuk Esterase: x / RBC: x / WBC x   Sq Epi: x / Non Sq Epi: x / Bacteria: x        CULTURES:  Culture Results:   Testing in progress (06-19 @ 14:32)        I&O:       Medications:    RADIOLOGY & ADDITIONAL STUDIES:    EEG CLASSIFICATION:    Abnormal EEG in the awake, and drowsy states.  1.	    ------------------------------------------------------------------  CLINICAL IMPRESSION:    Mild nonspecific diffuse or multifocal cerebral dysfunction.   No epileptiform pattern or seizure recorded. Patient is a 69y old  Female who presents with a chief complaint of Acute decompensated heart failure (20 Jun 2025 12:09)    HPI:  69F with PMHx DM, HTN, VERONIKA, sciatica, peripheral neuropathy, seizures initially presenting to [facility] on 6/10/2025 complaining of progressively worsening shortness of breath x1 month, admitted for acute decompensated heart failure exacerbation and possible pneumonia. She was receiving lasix IV for diuresis & Rocephin for PNA. Transferred to CCU at outside hospital, and subsequently transferred to [facility] CICU.     Upon arrival to [facility] CICU, patient on bipap, appears comfortable, hemodynamically stable.  (14 Jun 2025 01:27)      Interval history: Evaluated at bedside. Patient awake and alert but with speech latency, on NC oxygen. RRT today for fluctuating mentation        Vital Signs Last 24 Hrs  T(C): 37 (20 Jun 2025 10:54), Max: 37 (20 Jun 2025 10:54)  T(F): 98.6 (20 Jun 2025 10:54), Max: 98.6 (20 Jun 2025 10:54)  HR: 98 (20 Jun 2025 14:25) (97 - 105)  BP: 100/52 (20 Jun 2025 10:54) (100/52 - 109/61)  BP(mean): --  RR: 18 (20 Jun 2025 10:54) (17 - 18)  SpO2: 94% (20 Jun 2025 14:25) (94% - 100%)    Parameters below as of 20 Jun 2025 10:54  Patient On (Oxygen Delivery Method): nasal cannula  O2 Flow (L/min): 2    Physical Exam:  Constitutional: Comfortable, lying in bed, on NC oxygen  Neuro  General - NAD, pleasant, cooperative   Neurologic Exam:  Mental status - Awake, Alert, Oriented to person, place, and time ( everything but date and day of week). Speech fluent but bradyphonic, repetition and naming intact. Follows simple commands. Fund of knowledge- tony Kulwant was president    Cranial nerves:  CN II: Visual fields are full to confrontation.  Pupils are equal and reactive to light.  CN III, IV, VI: EOMI, no nystagmus, no ptosis  CN V: Facial sensation is intact to touch in all 3 divisions bilaterally.  CN VII: Face is symmetric with normal eye closure and smile.  CN VII: Hearing is normal to rubbing fingers  CN IX, X: Phonation is normal.  CN XI: Head turning and shoulder shrug are intact  CN XII: Tongue is midline with normal movements and no atrophy.    Motor - Normal bulk and tone throughout. No pronator drift of out-stretched arms.  Strength testing limited by R. shoulder pain and effort-limited exam.            Deltoid(C5)  Biceps(C6)    Triceps(C7)     Wrist Extension    Wrist Flexion (C8)       R           4-                 4                        4-                    4                              4                    5  L            5                 5                        5                     5                              5                     5    Raised LE b/l against gravity (lifted L>R)    Sensation - Light touch intact throughout    DTR's -             Biceps      Triceps     Brachioradialis      Patellar    Ankle    Toes/plantar response  R             2+             2+                  2+                      Deferred d/t pt comfort  L              2+             2+                 2+                       Deferred d/t pt comfort                    Coordination -  Fine finger movements are intact. There is mild dysmetria on finger-to-nose although slight hand tremor.    LABS:                        9.3    10.82 )-----------( 283      ( 20 Jun 2025 06:30 )             29.9     06-20    134[L]  |  94[L]  |  76[H]  ----------------------------<  100[H]  4.8   |  26  |  1.54[H]    Ca    9.9      20 Jun 2025 06:30  Mg     1.9     06-19        Urinalysis Basic - ( 20 Jun 2025 06:30 )    Color: x / Appearance: x / SG: x / pH: x  Gluc: 100 mg/dL / Ketone: x  / Bili: x / Urobili: x   Blood: x / Protein: x / Nitrite: x   Leuk Esterase: x / RBC: x / WBC x   Sq Epi: x / Non Sq Epi: x / Bacteria: x        CULTURES:  Culture Results:   Testing in progress (06-19 @ 14:32)        I&O:       Medications:    RADIOLOGY & ADDITIONAL STUDIES:    EEG CLASSIFICATION:    Abnormal EEG in the awake, and drowsy states.  1.	    ------------------------------------------------------------------  CLINICAL IMPRESSION:    Mild nonspecific diffuse or multifocal cerebral dysfunction.   No epileptiform pattern or seizure recorded.

## 2025-06-20 NOTE — PROGRESS NOTE ADULT - SUBJECTIVE AND OBJECTIVE BOX
CARDIOLOGY CONSULT PROGRESS NOTE  SHREE WYATT  MRN-[id number]    INTERVAL EVENTS:  - Thoracentesis yesterday  - NAEO. RRT called for AMS this AM, pt appeared at her baseline on our eval though drowsy    ROS:  All other review of systems is negative unless indicated above    MEDICATIONS  (STANDING):  aspirin  chewable 81 milliGRAM(s) Oral daily  atorvastatin 80 milliGRAM(s) Oral at bedtime  divalproex  milliGRAM(s) Oral two times a day  gabapentin 300 milliGRAM(s) Oral three times a day  insulin lispro (ADMELOG) corrective regimen sliding scale   SubCutaneous three times a day before meals  insulin lispro (ADMELOG) corrective regimen sliding scale   SubCutaneous at bedtime  OLANZapine 2.5 milliGRAM(s) Oral once  pantoprazole    Tablet 40 milliGRAM(s) Oral before breakfast  polyethylene glycol 3350 17 Gram(s) Oral at bedtime  sacubitril 49 mG/valsartan 51 mG 1 Tablet(s) Oral two times a day  senna 2 Tablet(s) Oral at bedtime    MEDICATIONS  (PRN):  acetaminophen     Tablet .. 650 milliGRAM(s) Oral every 6 hours PRN Temp greater or equal to 38C (100.4F), Mild Pain (1 - 3)  aluminum hydroxide/magnesium hydroxide/simethicone Suspension 30 milliLiter(s) Oral every 4 hours PRN Dyspepsia  melatonin 3 milliGRAM(s) Oral at bedtime PRN Insomnia  ondansetron Injectable 4 milliGRAM(s) IV Push every 8 hours PRN Nausea and/or Vomiting    Allergies    shellfish (Other; Short breath)  No Known Drug Allergies    Intolerances    lactose (Unknown)    P/E:  Vital Signs Last 24 Hrs  T(C): 37 (20 Jun 2025 10:54), Max: 37 (20 Jun 2025 10:54)  T(F): 98.6 (20 Jun 2025 10:54), Max: 98.6 (20 Jun 2025 10:54)  HR: 97 (20 Jun 2025 11:26) (97 - 105)  BP: 100/52 (20 Jun 2025 10:54) (100/52 - 109/61)  BP(mean): --  RR: 18 (20 Jun 2025 10:54) (17 - 18)  SpO2: 100% (20 Jun 2025 11:26) (95% - 100%)    Parameters below as of 20 Jun 2025 10:54  Patient On (Oxygen Delivery Method): nasal cannula  O2 Flow (L/min): 2    Daily     Daily   I&O's Detail    I&O's Summary    - gen: laying in hospital bed, NAD  - HEENT: MMM, NCAT  - neck: no JVD, supple  - heart: RRR, nml S1/S2, no RMG  - lungs: CTABL, nml WOB  - abd: soft, NTND, NABS  - ext: WWP, PPP, no ART  - POCUS: IVC small and collapsible     RELEVANT RECENT LABS/IMAGING/STUDIES:                        9.3    10.82 )-----------( 283      ( 20 Jun 2025 06:30 )             29.9     06-20    134[L]  |  94[L]  |  76[H]  ----------------------------<  100[H]  4.8   |  26  |  1.54[H]    Ca    9.9      20 Jun 2025 06:30  Mg     1.9     06-19          --------------------------------------  ABG - ( 19 Jun 2025 18:35 )  pH, Arterial: 7.44  pH, Blood: x     /  pCO2: 41    /  pO2: 125   / HCO3: 28    / Base Excess: 3.3   /  SaO2: 99.2                  --------------------------------------    Culture - Fungal, Body Fluid (collected 19 Jun 2025 14:32)  Source: Pleural Fl Pleural Fluid  Preliminary Report (20 Jun 2025 10:59):    Testing in progress    Culture - Body Fluid with Gram Stain (collected 19 Jun 2025 14:32)  Source: Pleural Fl Pleural Fluid  Gram Stain (19 Jun 2025 22:39):    polymorphonuclear leukocytes seen by cytocentrifuge    No organisms seen by cytocentrifuge

## 2025-06-20 NOTE — PROGRESS NOTE ADULT - SUBJECTIVE AND OBJECTIVE BOX
INTERVAL HPI/OVERNIGHT EVENTS:    remains in NSR, bumex on hold as per CHF manoj reyes for eitology of cardiomyopathy underway    MEDICATIONS  (STANDING):  aspirin  chewable 81 milliGRAM(s) Oral daily  atorvastatin 80 milliGRAM(s) Oral at bedtime  divalproex  milliGRAM(s) Oral two times a day  gabapentin 200 milliGRAM(s) Oral three times a day  insulin lispro (ADMELOG) corrective regimen sliding scale   SubCutaneous three times a day before meals  insulin lispro (ADMELOG) corrective regimen sliding scale   SubCutaneous at bedtime  OLANZapine 2.5 milliGRAM(s) Oral once  pantoprazole    Tablet 40 milliGRAM(s) Oral before breakfast  polyethylene glycol 3350 17 Gram(s) Oral at bedtime  sacubitril 49 mG/valsartan 51 mG 1 Tablet(s) Oral two times a day  senna 2 Tablet(s) Oral at bedtime    MEDICATIONS  (PRN):  acetaminophen     Tablet .. 650 milliGRAM(s) Oral every 6 hours PRN Temp greater or equal to 38C (100.4F), Mild Pain (1 - 3)  aluminum hydroxide/magnesium hydroxide/simethicone Suspension 30 milliLiter(s) Oral every 4 hours PRN Dyspepsia  melatonin 3 milliGRAM(s) Oral at bedtime PRN Insomnia  ondansetron Injectable 4 milliGRAM(s) IV Push every 8 hours PRN Nausea and/or Vomiting      Allergies    shellfish (Other; Short breath)  No Known Drug Allergies    Intolerances    lactose (Unknown)    ROS:  General: Pt denies recent weight loss/fever/chills    Neurological: denies numbness or  sensation loss    Cardiovascular: denies chest pain/palpitations/leg edema    Respiratory and Thorax: denies SOB/cough/wheezing    Gastrointestinal: denies abdominal pain/diarrhea/constipation/bloody stool    Genitourinary: denies urinary frequency/urgency/ dysuria    Musculoskeletal: denies joint pain or swelling, denies restricted motion    Hematologic: denies abnormal bleeding  	    	  	    		        	    	            Vital Signs Last 24 Hrs  T(C): 36.7 (20 Jun 2025 17:06), Max: 37 (20 Jun 2025 10:54)  T(F): 98.1 (20 Jun 2025 17:06), Max: 98.6 (20 Jun 2025 10:54)  HR: 91 (20 Jun 2025 18:21) (91 - 99)  BP: 95/61 (20 Jun 2025 18:21) (93/57 - 109/61)  BP(mean): --  RR: 18 (20 Jun 2025 17:06) (18 - 18)  SpO2: 98% (20 Jun 2025 17:06) (94% - 100%)    Parameters below as of 20 Jun 2025 17:06  Patient On (Oxygen Delivery Method): nasal cannula  O2 Flow (L/min): 2    Daily     Daily     06-20 @ 07:01  -  06-20 @ 18:49  --------------------------------------------------------  IN: 100 mL / OUT: 400 mL / NET: -300 mL      Physical Exam:    lethargic  ax0x3  cor RRR  lung clear    abd soft   ext no edema      LABS:                        9.3    10.82 )-----------( 283      ( 20 Jun 2025 06:30 )             29.9     06-20    134[L]  |  94[L]  |  76[H]  ----------------------------<  100[H]  4.8   |  26  |  1.54[H]    Ca    9.9      20 Jun 2025 06:30  Mg     1.9     06-19        Urinalysis Basic - ( 20 Jun 2025 06:30 )    Color: x / Appearance: x / SG: x / pH: x  Gluc: 100 mg/dL / Ketone: x  / Bili: x / Urobili: x   Blood: x / Protein: x / Nitrite: x   Leuk Esterase: x / RBC: x / WBC x   Sq Epi: x / Non Sq Epi: x / Bacteria: x        RADIOLOGY & ADDITIONAL TESTS:    TELE:    EKG:

## 2025-06-20 NOTE — CHART NOTE - NSCHARTNOTEFT_GEN_A_CORE
HPI:  69F with PMHx DM, HTN, VERONIKA, sciatica, peripheral neuropathy, seizures initially presenting to [facility] on 6/10/2025 complaining of progressively worsening shortness of breath x1 month, admitted for acute decompensated heart failure exacerbation and possible pneumonia. She was receiving lasix IV for diuresis & Rocephin for PNA. Transferred to CCU at outside hospital, and subsequently transferred to [facility] CICU.     Upon arrival to [facility] CICU, patient on bipap, appears comfortable, hemodynamically stable.  (14 Jun 2025 01:27)    Patient w/ waxing/waning AMS this morning. RRT subsequently called. Patient placed on bipap briefly and returned back to baseline mental status. Discussed w/ neurology regarding AMS, ABG and valproic acid ordered. Neuro reccomended to continue depakote 500 mg bid and decrease gabapentin to 200 mg tid. 24-hr vEEG ordered as well. Discussed w/ medicine attending Dr. Finn.    T(C): 36.7 (06-20-25 @ 17:06), Max: 37 (06-20-25 @ 10:54)  HR: 91 (06-20-25 @ 18:21) (91 - 99)  BP: 95/61 (06-20-25 @ 18:21) (93/57 - 109/61)  RR: 18 (06-20-25 @ 17:06) (18 - 18)  SpO2: 98% (06-20-25 @ 17:06) (94% - 100%)    CONSTITUTIONAL: Well groomed, no apparent distress  EYES: PERRLA and symmetric, EOMI, No conjunctival or scleral injection, non-icteric  ENMT: Oral mucosa with moist membranes. Normal dentition; no pharyngeal injection or exudates             NECK: Supple, symmetric and without tracheal deviation   RESP: No respiratory distress, no use of accessory muscles; CTA b/l, no WRR  CV: RRR, +S1S2, no MRG; no JVD; no peripheral edema  NEURO: CN II-XII intact; normal reflexes in upper and lower extremities, sensation intact in upper and lower extremities b/l to light touch   PSYCH: Appropriate insight/judgment; A+O x 2-3, mood and affect appropriate, recent/remote memory intact

## 2025-06-20 NOTE — PROGRESS NOTE ADULT - ASSESSMENT
ASSESSMENT   Pt is a 69y Woman with PMH of HTN. DM, VERONIKA on BiPAP, neuropathy, hx of seizures on depakote, admitted for acute exacerbation of HF and initally seen for episode of lethargy and decreased responsiveness which has now gradually resolved after a 30-40 minutes after BIPAP use. NIHSS-4 ( non focal), MRI Brain without acute findings, routine EEG with multifocal cerebral dysfunction but no epileptiform activity and seizures. RRT for decreased responsiveness in setting of hypercapnia. Mild WBC elevation- 10.82 with BUN- 76 and cr-1.54. vit [id number], valproic acid in serum 53.    IMPRESSION   Brief episodes of AMS and decreased responsiveness possibly related to toxic metabolic encephalopathy given abnormal labs- overall pt neurologically improved since episode this morning. No focal deficits on exam, unlikely stroke gi Low suspicion for seizure activity ( stable on current ASMs for many years) and no epileptiform activity on routine EEG.    RECOMMENDATION   -cvEEG for 24 hours can be considered for further evaluation  -Lower Gabapentin to 200mg TID ( baseline normal creatinine, can trial lower dose due to renal function derangement)  -C/w home ASM depakote 500 BID  -Continue baby aspirin and atorvastatin 80mg  -Continue vitamin D and calcium supplementation  -Rfm3s-3.2, lipid profile- LDL-82  -VTE prophylaxis- chemical if no C/I, IPC   -PT/OT evaluation     Seen and discussed with Dr. Akins, neurology attending

## 2025-06-20 NOTE — PROGRESS NOTE ADULT - PROBLEM SELECTOR PLAN 2
- Acute hypoxia likely i/s/o of pulmonary edema  - Was on bipap - now on 2LNC  - C/w DuoNebs & diuresis as above  - Appreciate pulm recs as above

## 2025-06-20 NOTE — PROGRESS NOTE ADULT - ATTENDING COMMENTS
Pt is more awake today.  She denies SOB.  She appears intravascularly euvolemic. Bedside POCUS shows small and collapsable IVC (RAP ~3mmHg).  Labs and BP are stable  Plan  OK to stop bumex  Stop hydral and increase ARNI.   Pending LHC/RHC and cMRI.   Pulmonary to comment on prior chest CT results - congloemrate of lymphadenopaties  PLan as above.

## 2025-06-21 DIAGNOSIS — R59.0 LOCALIZED ENLARGED LYMPH NODES: ICD-10-CM

## 2025-06-21 LAB
ANION GAP SERPL CALC-SCNC: 13 MMOL/L — SIGNIFICANT CHANGE UP (ref 5–17)
BUN SERPL-MCNC: 84 MG/DL — HIGH (ref 7–23)
CALCIUM SERPL-MCNC: 9.8 MG/DL — SIGNIFICANT CHANGE UP (ref 8.4–10.5)
CHLORIDE SERPL-SCNC: 96 MMOL/L — SIGNIFICANT CHANGE UP (ref 96–108)
CO2 SERPL-SCNC: 25 MMOL/L — SIGNIFICANT CHANGE UP (ref 22–31)
CREAT SERPL-MCNC: 1.7 MG/DL — HIGH (ref 0.5–1.3)
EGFR: 32 ML/MIN/1.73M2 — LOW
EGFR: 32 ML/MIN/1.73M2 — LOW
GLUCOSE BLDC GLUCOMTR-MCNC: 155 MG/DL — HIGH (ref 70–99)
GLUCOSE BLDC GLUCOMTR-MCNC: 170 MG/DL — HIGH (ref 70–99)
GLUCOSE BLDC GLUCOMTR-MCNC: 178 MG/DL — HIGH (ref 70–99)
GLUCOSE BLDC GLUCOMTR-MCNC: 193 MG/DL — HIGH (ref 70–99)
GLUCOSE SERPL-MCNC: 101 MG/DL — HIGH (ref 70–99)
HCT VFR BLD CALC: 29.1 % — LOW (ref 34.5–45)
HGB BLD-MCNC: 8.7 G/DL — LOW (ref 11.5–15.5)
MCHC RBC-ENTMCNC: 27.4 PG — SIGNIFICANT CHANGE UP (ref 27–34)
MCHC RBC-ENTMCNC: 29.9 G/DL — LOW (ref 32–36)
MCV RBC AUTO: 91.8 FL — SIGNIFICANT CHANGE UP (ref 80–100)
NRBC # BLD AUTO: 0 K/UL — SIGNIFICANT CHANGE UP (ref 0–0)
NRBC # FLD: 0 K/UL — SIGNIFICANT CHANGE UP (ref 0–0)
NRBC BLD AUTO-RTO: 0 /100 WBCS — SIGNIFICANT CHANGE UP (ref 0–0)
PLATELET # BLD AUTO: 276 K/UL — SIGNIFICANT CHANGE UP (ref 150–400)
PMV BLD: 11.8 FL — SIGNIFICANT CHANGE UP (ref 7–13)
POTASSIUM SERPL-MCNC: 4.9 MMOL/L — SIGNIFICANT CHANGE UP (ref 3.5–5.3)
POTASSIUM SERPL-SCNC: 4.9 MMOL/L — SIGNIFICANT CHANGE UP (ref 3.5–5.3)
RBC # BLD: 3.17 M/UL — LOW (ref 3.8–5.2)
RBC # FLD: 16.4 % — HIGH (ref 10.3–14.5)
SODIUM SERPL-SCNC: 134 MMOL/L — LOW (ref 135–145)
WBC # BLD: 9.68 K/UL — SIGNIFICANT CHANGE UP (ref 3.8–10.5)
WBC # FLD AUTO: 9.68 K/UL — SIGNIFICANT CHANGE UP (ref 3.8–10.5)

## 2025-06-21 PROCEDURE — 99233 SBSQ HOSP IP/OBS HIGH 50: CPT

## 2025-06-21 PROCEDURE — 95720 EEG PHY/QHP EA INCR W/VEEG: CPT

## 2025-06-21 PROCEDURE — 71250 CT THORAX DX C-: CPT | Mod: 26

## 2025-06-21 RX ORDER — HEPARIN SODIUM 1000 [USP'U]/ML
5000 INJECTION INTRAVENOUS; SUBCUTANEOUS EVERY 8 HOURS
Refills: 0 | Status: DISCONTINUED | OUTPATIENT
Start: 2025-06-21 | End: 2025-06-22

## 2025-06-21 RX ORDER — ACETAMINOPHEN 500 MG/5ML
1000 LIQUID (ML) ORAL ONCE
Refills: 0 | Status: COMPLETED | OUTPATIENT
Start: 2025-06-21 | End: 2025-06-21

## 2025-06-21 RX ADMIN — GABAPENTIN 200 MILLIGRAM(S): 400 CAPSULE ORAL at 21:37

## 2025-06-21 RX ADMIN — Medication 400 MILLIGRAM(S): at 09:31

## 2025-06-21 RX ADMIN — ATORVASTATIN CALCIUM 80 MILLIGRAM(S): 80 TABLET, FILM COATED ORAL at 21:37

## 2025-06-21 RX ADMIN — Medication 650 MILLIGRAM(S): at 18:10

## 2025-06-21 RX ADMIN — Medication 40 MILLIGRAM(S): at 06:49

## 2025-06-21 RX ADMIN — INSULIN LISPRO 1: 100 INJECTION, SOLUTION INTRAVENOUS; SUBCUTANEOUS at 17:39

## 2025-06-21 RX ADMIN — Medication 1000 MILLIGRAM(S): at 10:01

## 2025-06-21 RX ADMIN — Medication 81 MILLIGRAM(S): at 13:04

## 2025-06-21 RX ADMIN — HEPARIN SODIUM 5000 UNIT(S): 1000 INJECTION INTRAVENOUS; SUBCUTANEOUS at 21:37

## 2025-06-21 RX ADMIN — GABAPENTIN 200 MILLIGRAM(S): 400 CAPSULE ORAL at 13:04

## 2025-06-21 RX ADMIN — Medication 500 MILLIGRAM(S): at 17:39

## 2025-06-21 RX ADMIN — INSULIN LISPRO 1: 100 INJECTION, SOLUTION INTRAVENOUS; SUBCUTANEOUS at 07:50

## 2025-06-21 RX ADMIN — INSULIN LISPRO 1: 100 INJECTION, SOLUTION INTRAVENOUS; SUBCUTANEOUS at 12:28

## 2025-06-21 RX ADMIN — Medication 650 MILLIGRAM(S): at 17:40

## 2025-06-21 RX ADMIN — SACUBITRIL AND VALSARTAN 1 TABLET(S): 6; 6 PELLET ORAL at 06:09

## 2025-06-21 RX ADMIN — HEPARIN SODIUM 5000 UNIT(S): 1000 INJECTION INTRAVENOUS; SUBCUTANEOUS at 13:04

## 2025-06-21 RX ADMIN — GABAPENTIN 200 MILLIGRAM(S): 400 CAPSULE ORAL at 06:09

## 2025-06-21 RX ADMIN — SACUBITRIL AND VALSARTAN 1 TABLET(S): 6; 6 PELLET ORAL at 17:40

## 2025-06-21 RX ADMIN — Medication 500 MILLIGRAM(S): at 06:09

## 2025-06-21 NOTE — PROGRESS NOTE ADULT - PROBLEM SELECTOR PLAN 1
Patient presents with new onset oxygen needs. Likely due to acute decompensated heart failure. S/p thoracentesis.  - Treat heart failure below   - Wean oxygen as tolerated, goal SpO2>92%    Altered mental status due to Acute Metabolic Encephalopathy from Acute Hypoxic Hypercarbic Respiratory Failure  RRT 6/17 -CT head negative  EEG with nonspecific slowing but no sign of seizure  Neurology saw - c/w depakote but decreased gabapentin  AMS improved with BIPAP, continue BIPAP qhs and PRN for naps

## 2025-06-21 NOTE — EEG REPORT - NS EEG TEXT BOX
REPORT OF CONTINUOUS VIDEO EEG    [facility]: [address], [place] [address], Phone: [phone]  [facility]: [phone] [place] [address], Phone: [phone]  [facility]: [address] [place] [address], Phone: [phone]    Patient Name: Brandy Waters   Age: 69 year, : 1955  Noe: -6  W  Referring Physician: -    Study Start: 2025	23:55      Study End:  2025  08:00    Study Duration: 8 hr     -------------------------------------------------------------------------------------------------------  EEG Recording Technique:  The patient underwent continuous Video-EEG monitoring, using Telemetry System hardware on the XLTek Digital System. EEG and video data were stored on a computer hard drive with important events saved in digital archive files. The material was reviewed by a physician (electroencephalographer / epileptologist) on a daily basis. Hayden and seizure detection algorithms were utilized and reviewed. An EEG Technician attended to the patient, and was available throughout daytime work hours.  The epilepsy center neurologist was available in person or on call 24-hours per day.    EEG Placement and Labeling of Electrodes:  The EEG was performed utilizing 20 channel referential EEG connections (coronal over temporal over parasagittal montage) using all standard 10-20 electrode placements with EKG, with additional electrodes placed in the inferior temporal region using the modified 10-10 montage electrode placements for elective admissions, or if deemed necessary. Recording was at a sampling rate of 256 samples per second per channel. Time synchronized digital video recording was done simultaneously with EEG recording. A low light infrared camera was used for low light recording.     -------------------------------------------------------------------------------------------------------  History: -  Patient is a 69y old  Female who presents with a chief complaint of Acute decompensated heart failure (2025 12:09)    Medication  TYLENOL      -------------------------------------------------------------------------------------------------------  Interpretation:    [[[Abbreviation Key:  PDR=alpha rhythm/posterior dominant rhythm. A-P=anterior posterior.  Amplitude: ‘very low’:<20; ‘low’:20-49; ‘medium’:; ‘high’:>150uV.  Persistence for periodic/rhythmic patterns (% of epoch) ‘rare’:<1%; ‘occasional’:1-10%; ‘frequent’:10-50%; ‘abundant’:50-90%; ‘continuous’:>90%.  Persistence for sporadic discharges: ‘rare’:<1/hr; ‘occasional’:1/min-1/hr; ‘frequent’:>1/min; ‘abundant’:>1/10 sec.  RPP=rhythmic and periodic patterns; GRDA=generalized rhythmic delta activity; FIRDA=frontal intermittent GRDA; LRDA=lateralized rhythmic delta activity; TIRDA=temporal intermittent rhythmic delta activity;  LPD=PLED=lateralized periodic discharges; GPD=generalized periodic discharges; BIPDs =bilateral independent periodic discharges; Mf=multifocal; SIRPDs=stimulus induced rhythmic, periodic, or ictal appearing discharges; BIRDs=brief potentially ictal rhythmic discharges >4 Hz, lasting .5-10s; PFA (paroxysmal bursts >13 Hz or =8 Hz <10s).  Modifiers: +F=with fast component; +S=with spike component; +R=with rhythmic component.  S-B=burst suppression pattern.  Max=maximal. N1-drowsy; N2-stage II sleep; N3-slow wave sleep. SSS/BETS=small sharp spikes/benign epileptiform transients of sleep. HV=hyperventilation; PS=photic stimulation]]]    FINDINGS:      Background:  SYMMETRY: symmetric  CONTINUOUS: continuous  PDR: symmetric, poorly-modulated 6-7 Hz activity, with amplitude to 40 uV, that attenuated to eye opening.  Low amplitude frontal beta noted in wakefulness.  REACTIVITY: present  VOLTAGE: normal, [defined typically between 20-150uV]  AP GRADIENT: absent  BREACH: absent  OTHER: none    GENERALIZED SLOWING: present. Background is diffusely slow consisting of polymorphic delta theta activity up to 6-7 Hz    FOCAL SLOWING: none was present.    State Changes:   Drowsiness was characterized by fragmentation, attenuation, and slowing of the background activity.    N2 sleep transients with symmetric spindles and poorly formed K-complexes.      Sporadic Epileptiform Discharges:   None    Rhythmic and Periodic Patterns (RPPs):  None     Electrographic and Electroclinical seizures:  None    Other Clinical Events:  None    Activation Procedures:   Hyperventilation was not performed.    Photic stimulation was not performed.      Artifacts:  Intermittent myogenic and movement artifacts were noted.    ECG:  The heart rate on single channel ECG was predominantly between 70-80 BPM.  -------------------------------------------------------------------------------------------------------  EEG Classification:    Abnormal EEG in awake, drowsy and asleep states  1. Mild to moderate diffuse background slowing      -------------------------------------------------------------------------------------------------------  EEG Impression / Clinical Correlate:    Abnormal prolonged EEG study due to Mild to moderate diffuse cerebral dysfunction that is not specific in etiology    No epileptic discharges recorded.  No seizures recorded.      -------------------------------------------------------------------------------------------------------  ALEXA Houston  Attending Physician, [facility]    ------------------------------------  EEG Reading Room: [phone]  On Call Service After Hours: [phone]

## 2025-06-21 NOTE — PROGRESS NOTE ADULT - SUBJECTIVE AND OBJECTIVE BOX
[facility] Division of [facility] Medicine  Betzaida Mijares MD  Available via MS Teams    SUBJECTIVE / OVERNIGHT EVENTS:  No acute events overnight. C/o pain over head due to EEG leads.     MEDICATIONS  (STANDING):  aspirin  chewable 81 milliGRAM(s) Oral daily  atorvastatin 80 milliGRAM(s) Oral at bedtime  divalproex  milliGRAM(s) Oral two times a day  gabapentin 200 milliGRAM(s) Oral three times a day  heparin   Injectable 5000 Unit(s) SubCutaneous every 8 hours  insulin lispro (ADMELOG) corrective regimen sliding scale   SubCutaneous three times a day before meals  insulin lispro (ADMELOG) corrective regimen sliding scale   SubCutaneous at bedtime  OLANZapine 2.5 milliGRAM(s) Oral once  pantoprazole    Tablet 40 milliGRAM(s) Oral before breakfast  polyethylene glycol 3350 17 Gram(s) Oral at bedtime  sacubitril 49 mG/valsartan 51 mG 1 Tablet(s) Oral two times a day  senna 2 Tablet(s) Oral at bedtime    MEDICATIONS  (PRN):  acetaminophen     Tablet .. 650 milliGRAM(s) Oral every 6 hours PRN Temp greater or equal to 38C (100.4F), Mild Pain (1 - 3)  aluminum hydroxide/magnesium hydroxide/simethicone Suspension 30 milliLiter(s) Oral every 4 hours PRN Dyspepsia  melatonin 3 milliGRAM(s) Oral at bedtime PRN Insomnia  ondansetron Injectable 4 milliGRAM(s) IV Push every 8 hours PRN Nausea and/or Vomiting      I&O's Summary    20 Jun 2025 07:01  -  21 Jun 2025 07:00  --------------------------------------------------------  IN: 100 mL / OUT: 1000 mL / NET: -900 mL        PHYSICAL EXAM:  Vital Signs Last 24 Hrs  T(C): 36.7 (21 Jun 2025 11:46), Max: 36.7 (20 Jun 2025 17:06)  T(F): 98.1 (21 Jun 2025 11:46), Max: 98.1 (20 Jun 2025 17:06)  HR: 90 (21 Jun 2025 11:46) (85 - 98)  BP: 98/60 (21 Jun 2025 11:46) (93/57 - 102/61)  BP(mean): --  RR: 18 (21 Jun 2025 11:46) (17 - 18)  SpO2: 92% (21 Jun 2025 11:46) (92% - 99%)    Parameters below as of 21 Jun 2025 11:46  Patient On (Oxygen Delivery Method): nasal cannula  O2 Flow (L/min): 2    CONSTITUTIONAL: NAD  EYES: EOMI; conjunctiva and sclera clear  ENMT: Moist oral mucosa  RESPIRATORY: Normal respiratory effort; lungs are clear to auscultation bilaterally  CARDIOVASCULAR: Regular rate and rhythm, no murmur, 1+ b/l LE edema  ABDOMEN: Nontender to palpation,no rebound/guarding  PSYCH: A+O to person, place, and time  NEUROLOGY: no FND     LABS:                        8.7    9.68  )-----------( 276      ( 21 Jun 2025 06:44 )             29.1     06-21    134[L]  |  96  |  84[H]  ----------------------------<  101[H]  4.9   |  25  |  1.70[H]    Ca    9.8      21 Jun 2025 06:45  Mg     1.9     06-19            Urinalysis Basic - ( 21 Jun 2025 06:45 )    Color: x / Appearance: x / SG: x / pH: x  Gluc: 101 mg/dL / Ketone: x  / Bili: x / Urobili: x   Blood: x / Protein: x / Nitrite: x   Leuk Esterase: x / RBC: x / WBC x   Sq Epi: x / Non Sq Epi: x / Bacteria: x        Culture - Fungal, Body Fluid (collected 19 Jun 2025 14:32)  Source: Pleural Fl Pleural Fluid  Preliminary Report (21 Jun 2025 08:05):    No growth    Culture - Body Fluid with Gram Stain (collected 19 Jun 2025 14:32)  Source: Pleural Fl Pleural Fluid  Gram Stain (19 Jun 2025 22:39):    polymorphonuclear leukocytes seen by cytocentrifuge    No organisms seen by cytocentrifuge  Preliminary Report (20 Jun 2025 16:50):    No growth          RADIOLOGY & ADDITIONAL TESTS:  New Imaging Personally Reviewed Today:  New Electrocardiogram Personally Reviewed Today:  Other Results Reviewed Today:   Prior or Outpatient Records Reviewed Today with Summary:    COORDINATION OF CARE:  Consultant Communication and Details of Discussion (where applicable):

## 2025-06-21 NOTE — PROGRESS NOTE ADULT - TIME BILLING
Time spent on review of vitals, physical exam, documentation, and discussion of plan of care with patient, patient family, consultants and multidisciplinary team. Does not include time spent teaching and other separately reported services.

## 2025-06-21 NOTE — PROGRESS NOTE ADULT - PROBLEM SELECTOR PLAN 3
MIKAELA on CKD3b  Likely 2/2 diuresis  - diuretics on hold  - still uptrending, but entresto also uptitrated 6/20. CTM.

## 2025-06-21 NOTE — PROGRESS NOTE ADULT - PROBLEM SELECTOR PROBLEM 3
Patient called and left voicemail to reschedule Friday appt as he has a family emergency and he will be leaving town from Thursday- Tuesday. Writer rescheduled appt on 4/15. Patient requests a refill for Suboxone as he will require a refill when he is out of town. Patient uses [facility] [address] in [place]    PATIENT REFILL REQUEST    Name of Medication(s): Suboxone   Dosage of Medication: 8-2 mg SL film (place 1 strip under the tongue daily)  Date last dispensed: 3/8/19  Date of last office visit: 3/8/19  Date of next office visit: 4/12/19  Date of last UDS: 3/18/19   Additional comments: Patient will be out of town as of Thursday. Early refill date placed on RX for 4/3/19 pending approval.     PDMP reviewed; no aberrant behavior identified.    Prescription routed to Dr. Rivas for approval.    Acute kidney injury superimposed on CKD

## 2025-06-21 NOTE — PROGRESS NOTE ADULT - PROBLEM SELECTOR PLAN 4
CT chest with L supraclavicular, mediastinal and hilar lymphadenopathy  - will d/w pt and family. IR c/s for L supraclavicular bx if within GOC. CT chest with L supraclavicular, mediastinal and hilar lymphadenopathy  - d/w pt and family re concern for cancer, wish to proceed with further eval. IR c/s for L supraclavicular bx.

## 2025-06-21 NOTE — PROGRESS NOTE ADULT - PROBLEM SELECTOR PLAN 2
On 6/14/25, TTE with EF 30-35% with wall motion abnormalities. Diuresed with Bumex drip earlier in hospitalization.   - Heparin drip discontinued - no LV thrombus on repeat TTE  - c/w entresto  - diuretics on hold given MIKAELA  - f/u HF recs  - pending cMRI, will need Zyprexa before  - Will eventually need right and LHC

## 2025-06-21 NOTE — CONSULT NOTE ADULT - ASSESSMENT
Interventional Radiology    Evaluate for Procedure:     HPI: 69 year old female presenting with acute hypoxemic respiratory failure secondary to acute decompensated heart failure. Pt found to have enlarged lymph nodes on CT chest. IR c/s for biopsy.    Allergies: No Known Drug Allergies    Medications (Abx/Cardiac/Anticoagulation/Blood Products)    aspirin  chewable: 81 milliGRAM(s) Oral ( @ 13:04)  bumetanide Injectable: 2 milliGRAM(s) IV Push ( @ 09:29)  heparin   Injectable: 5000 Unit(s) SubCutaneous ( @ 13:04)  hydrALAZINE: 50 milliGRAM(s) Oral ( @ 09:29)  sacubitril 24 mG/valsartan 26 m Tablet(s) Oral ( @ 09:29)  sacubitril 49 mG/valsartan 51 m Tablet(s) Oral ( @ 06:09)    Data:    T(C): 36.7  HR: 93  BP: 98/60  RR: 18  SpO2: 98%    -WBC 9.68 / HgB 8.7 / Hct 29.1 / Plt 276  -Na 134 / Cl 96 / BUN 84 / Glucose 101  -K 4.9 / CO2 25 / Cr 1.70  -ALT -- / Alk Phos -- / T.Bili --  -INR 1.07 / .6      Radiology:     Assessment/Plan:   69 year old female presenting with acute hypoxemic respiratory failure secondary to acute decompensated heart failure. Pt found to have enlarged lymph nodes on CT chest. IR c/s for biopsy.    -- IR will plan to perform biopsy of L supraclavicular nodes under local  vs  pending availability.  -- Patient does not need to be NPO for procedure.  -- Patient on therapeutic lovenox. Please plan to hold 24 hours prior and 6 hours post.  -- AM CBC, BMP, Coags   -- Please place IR procedure order under Dr. Jamie Cole M.D.  PGY5/R4, Interventional Radiology Senior Resident    -Available on [employer] TEAMS for all non-urgent questions  -Emergent issues: [facility]-p.[phone]; [facility]-p.[phone] ([phone])  -Non-emergent consults: Please place a [place] order "Consult-Interventional Radiology" with an appropriate callback number  -Scheduling questions: [facility]: [phone]; [facility]: [phone]  -Clinic/Outpatient booking: [facility]: [phone]; [facility]: [phone]

## 2025-06-22 LAB
ALBUMIN SERPL ELPH-MCNC: 2.8 G/DL — LOW (ref 3.3–5)
ALP SERPL-CCNC: 53 U/L — SIGNIFICANT CHANGE UP (ref 40–120)
ALT FLD-CCNC: 13 U/L — SIGNIFICANT CHANGE UP (ref 10–45)
ANION GAP SERPL CALC-SCNC: 12 MMOL/L — SIGNIFICANT CHANGE UP (ref 5–17)
AST SERPL-CCNC: 19 U/L — SIGNIFICANT CHANGE UP (ref 10–40)
BASE EXCESS BLDV CALC-SCNC: 4.9 MMOL/L — HIGH (ref -2–3)
BILIRUB SERPL-MCNC: 0.2 MG/DL — SIGNIFICANT CHANGE UP (ref 0.2–1.2)
BUN SERPL-MCNC: 91 MG/DL — HIGH (ref 7–23)
CALCIUM SERPL-MCNC: 9.2 MG/DL — SIGNIFICANT CHANGE UP (ref 8.4–10.5)
CHLORIDE SERPL-SCNC: 96 MMOL/L — SIGNIFICANT CHANGE UP (ref 96–108)
CO2 BLDV-SCNC: 35 MMOL/L — HIGH (ref 22–26)
CO2 SERPL-SCNC: 26 MMOL/L — SIGNIFICANT CHANGE UP (ref 22–31)
CREAT SERPL-MCNC: 1.74 MG/DL — HIGH (ref 0.5–1.3)
EGFR: 31 ML/MIN/1.73M2 — LOW
EGFR: 31 ML/MIN/1.73M2 — LOW
GAS PNL BLDV: SIGNIFICANT CHANGE UP
GLUCOSE BLDC GLUCOMTR-MCNC: 110 MG/DL — HIGH (ref 70–99)
GLUCOSE BLDC GLUCOMTR-MCNC: 113 MG/DL — HIGH (ref 70–99)
GLUCOSE BLDC GLUCOMTR-MCNC: 132 MG/DL — HIGH (ref 70–99)
GLUCOSE BLDC GLUCOMTR-MCNC: 243 MG/DL — HIGH (ref 70–99)
GLUCOSE SERPL-MCNC: 114 MG/DL — HIGH (ref 70–99)
HCO3 BLDV-SCNC: 33 MMOL/L — HIGH (ref 22–29)
HCT VFR BLD CALC: 29.1 % — LOW (ref 34.5–45)
HGB BLD-MCNC: 8.7 G/DL — LOW (ref 11.5–15.5)
MCHC RBC-ENTMCNC: 27.4 PG — SIGNIFICANT CHANGE UP (ref 27–34)
MCHC RBC-ENTMCNC: 29.9 G/DL — LOW (ref 32–36)
MCV RBC AUTO: 91.5 FL — SIGNIFICANT CHANGE UP (ref 80–100)
NRBC # BLD AUTO: 0 K/UL — SIGNIFICANT CHANGE UP (ref 0–0)
NRBC # FLD: 0 K/UL — SIGNIFICANT CHANGE UP (ref 0–0)
NRBC BLD AUTO-RTO: 0 /100 WBCS — SIGNIFICANT CHANGE UP (ref 0–0)
PCO2 BLDV: 64 MMHG — HIGH (ref 39–42)
PH BLDV: 7.32 — SIGNIFICANT CHANGE UP (ref 7.32–7.43)
PLATELET # BLD AUTO: 287 K/UL — SIGNIFICANT CHANGE UP (ref 150–400)
PMV BLD: 11.4 FL — SIGNIFICANT CHANGE UP (ref 7–13)
PO2 BLDV: 33 MMHG — SIGNIFICANT CHANGE UP (ref 25–45)
POTASSIUM SERPL-MCNC: 4.8 MMOL/L — SIGNIFICANT CHANGE UP (ref 3.5–5.3)
POTASSIUM SERPL-SCNC: 4.8 MMOL/L — SIGNIFICANT CHANGE UP (ref 3.5–5.3)
PROT SERPL-MCNC: 6.7 G/DL — SIGNIFICANT CHANGE UP (ref 6–8.3)
RBC # BLD: 3.18 M/UL — LOW (ref 3.8–5.2)
RBC # FLD: 16 % — HIGH (ref 10.3–14.5)
SAO2 % BLDV: 53.4 % — LOW (ref 67–88)
SODIUM SERPL-SCNC: 134 MMOL/L — LOW (ref 135–145)
WBC # BLD: 8.11 K/UL — SIGNIFICANT CHANGE UP (ref 3.8–10.5)
WBC # FLD AUTO: 8.11 K/UL — SIGNIFICANT CHANGE UP (ref 3.8–10.5)

## 2025-06-22 PROCEDURE — 95720 EEG PHY/QHP EA INCR W/VEEG: CPT

## 2025-06-22 PROCEDURE — 99233 SBSQ HOSP IP/OBS HIGH 50: CPT | Mod: GC

## 2025-06-22 RX ADMIN — Medication 650 MILLIGRAM(S): at 06:45

## 2025-06-22 RX ADMIN — Medication 40 MILLIGRAM(S): at 06:15

## 2025-06-22 RX ADMIN — Medication 81 MILLIGRAM(S): at 13:17

## 2025-06-22 RX ADMIN — POLYETHYLENE GLYCOL 3350 17 GRAM(S): 17 POWDER, FOR SOLUTION ORAL at 22:29

## 2025-06-22 RX ADMIN — SACUBITRIL AND VALSARTAN 1 TABLET(S): 6; 6 PELLET ORAL at 17:49

## 2025-06-22 RX ADMIN — Medication 650 MILLIGRAM(S): at 06:15

## 2025-06-22 RX ADMIN — GABAPENTIN 200 MILLIGRAM(S): 400 CAPSULE ORAL at 13:17

## 2025-06-22 RX ADMIN — GABAPENTIN 200 MILLIGRAM(S): 400 CAPSULE ORAL at 06:16

## 2025-06-22 RX ADMIN — Medication 2 TABLET(S): at 22:29

## 2025-06-22 RX ADMIN — Medication 500 MILLIGRAM(S): at 06:15

## 2025-06-22 RX ADMIN — Medication 500 MILLIGRAM(S): at 18:34

## 2025-06-22 RX ADMIN — SACUBITRIL AND VALSARTAN 1 TABLET(S): 6; 6 PELLET ORAL at 11:45

## 2025-06-22 RX ADMIN — GABAPENTIN 200 MILLIGRAM(S): 400 CAPSULE ORAL at 22:30

## 2025-06-22 RX ADMIN — INSULIN LISPRO 2: 100 INJECTION, SOLUTION INTRAVENOUS; SUBCUTANEOUS at 17:48

## 2025-06-22 RX ADMIN — ATORVASTATIN CALCIUM 80 MILLIGRAM(S): 80 TABLET, FILM COATED ORAL at 22:30

## 2025-06-22 NOTE — PROGRESS NOTE ADULT - ASSESSMENT
69F with PMH of HTN, DM2, VERONIKA, sciatica, peripheral neuropathy, hx of seizure initially presented to [facility] on 6/10/25 with progressively worsening SOB, admitted for ADHF and possible pneumonia. EKG LBBB  Received IV diuretics (lasix) and antibiotics for pneumonia and was transferred to CCU. Ultimately transferred to [facility] CICU for further management. On arrival, she was on BIPAP & started on IV diuresis for respiratory distress & significant pulmonary edema. She has since been downgraded to telemetry & is on 2L NC. She has responded well to diuresis and GDMT. S/p thoracentesis 6/19. Her IVC today is small and collapsible. She is pending L/RHC for newly dx'ed CMP and cMRI to rule out infiltrative disease (OSH chest CT with significant mediastinal LAD-?sarcoid).     Cardiac Studies:  - TTE 6/14/25: LVEF 30-35%, nml RV, TAPSE 2.0cm, nml LA/RA, no AR, no MR, trace TR, IVC nml 1.4cm  - TTE OSH: Reported LVEF 20% - need official recordsNew cardiomyopath y, CHF , LBBB, mediastinal lyymphadenopathy, , lethargy seizure     advise    1 bumex on hold asper CHF service  2.  cardiac MRI to assess eitiology of cardiomyopathy  3. consider  pulmonary assessment in lgiht of lung noodules  4. neurology assessment underway , consider decreaase valproic acid and gabapentin with lethargy  5. left and right heart cath   6. possible CRT D ICD in future

## 2025-06-22 NOTE — PROGRESS NOTE ADULT - SUBJECTIVE AND OBJECTIVE BOX
Andre Reyes, M.D.  Office: [phone]  Available thru Microsoft Teams     Patient is a 69y old  Female who presents with a chief complaint of Acute decompensated heart failure (21 Jun 2025 15:43)          SUBJECTIVE / OVERNIGHT EVENTS:    No acute overnight events.        MEDICATIONS  (STANDING):  aspirin  chewable 81 milliGRAM(s) Oral daily  atorvastatin 80 milliGRAM(s) Oral at bedtime  divalproex  milliGRAM(s) Oral two times a day  gabapentin 200 milliGRAM(s) Oral three times a day  insulin lispro (ADMELOG) corrective regimen sliding scale   SubCutaneous three times a day before meals  insulin lispro (ADMELOG) corrective regimen sliding scale   SubCutaneous at bedtime  OLANZapine 2.5 milliGRAM(s) Oral once  pantoprazole    Tablet 40 milliGRAM(s) Oral before breakfast  polyethylene glycol 3350 17 Gram(s) Oral at bedtime  sacubitril 49 mG/valsartan 51 mG 1 Tablet(s) Oral two times a day  senna 2 Tablet(s) Oral at bedtime    MEDICATIONS  (PRN):  acetaminophen     Tablet .. 650 milliGRAM(s) Oral every 6 hours PRN Temp greater or equal to 38C (100.4F), Mild Pain (1 - 3)  aluminum hydroxide/magnesium hydroxide/simethicone Suspension 30 milliLiter(s) Oral every 4 hours PRN Dyspepsia  melatonin 3 milliGRAM(s) Oral at bedtime PRN Insomnia  ondansetron Injectable 4 milliGRAM(s) IV Push every 8 hours PRN Nausea and/or Vomiting          T(C): 36.9 (06-22 @ 04:00), Max: 37.3 (06-21 @ 20:22)   HR: 89   BP: 91/56   RR: 17   SpO2: 96%    PHYSICAL EXAM:    CONSTITUTIONAL: NAD, well-developed, well-groomed  EYES: PERRLA; conjunctiva and sclera clear  ENMT: Moist oral mucosa, no pharyngeal injection or exudates; normal dentition  RESPIRATORY: Normal respiratory effort; lungs are clear to auscultation bilaterally  CARDIOVASCULAR: Regular rate and rhythm, normal S1 and S2, no murmur/rub/gallop; No lower extremity edema; Peripheral pulses are 2+ bilaterally  ABDOMEN: Nontender to palpation, normoactive bowel sounds, no rebound/guarding; No hepatosplenomegaly  MUSCULOSKELETAL:  no clubbing or cyanosis of digits; no joint swelling or tenderness to palpation  PSYCH: A+O to person, place, and time; affect appropriate  NEUROLOGY: CN 2-12 are intact and symmetric; no gross sensory deficits       LABS:                        8.7    8.11  )-----------( 287      ( 22 Jun 2025 05:21 )             29.1      06-22    134[L]  |  96  |  91[H]  ----------------------------<  114[H]  4.8   |  26  |  1.74[H]    Ca    9.2      22 Jun 2025 05:21    TPro  6.7  /  Alb  2.8[L]  /  TBili  0.2  /  DBili  x   /  AST  19  /  ALT  13  /  AlkPhos  53  06-22       CAPILLARY BLOOD GLUCOSE      POCT Blood Glucose.: 113 mg/dL (22 Jun 2025 07:47)  POCT Blood Glucose.: 170 mg/dL (21 Jun 2025 21:10)  POCT Blood Glucose.: 178 mg/dL (21 Jun 2025 17:06)  POCT Blood Glucose.: 193 mg/dL (21 Jun 2025 11:36)      RADIOLOGY & ADDITIONAL TESTS:    Imaging Personally Reviewed:  Consultant(s) Notes Reviewed:    Care Discussed with Consultants/Other Providers:   Andre Reyes, M.D.  Office: [phone]  Available thru Microsoft Teams     Patient is a 69y old  Female who presents with a chief complaint of Acute decompensated heart failure (21 Jun 2025 15:43)          SUBJECTIVE / OVERNIGHT EVENTS:    No acute overnight events.  no complaints,  feeling better today        MEDICATIONS  (STANDING):  aspirin  chewable 81 milliGRAM(s) Oral daily  atorvastatin 80 milliGRAM(s) Oral at bedtime  divalproex  milliGRAM(s) Oral two times a day  gabapentin 200 milliGRAM(s) Oral three times a day  insulin lispro (ADMELOG) corrective regimen sliding scale   SubCutaneous three times a day before meals  insulin lispro (ADMELOG) corrective regimen sliding scale   SubCutaneous at bedtime  OLANZapine 2.5 milliGRAM(s) Oral once  pantoprazole    Tablet 40 milliGRAM(s) Oral before breakfast  polyethylene glycol 3350 17 Gram(s) Oral at bedtime  sacubitril 49 mG/valsartan 51 mG 1 Tablet(s) Oral two times a day  senna 2 Tablet(s) Oral at bedtime    MEDICATIONS  (PRN):  acetaminophen     Tablet .. 650 milliGRAM(s) Oral every 6 hours PRN Temp greater or equal to 38C (100.4F), Mild Pain (1 - 3)  aluminum hydroxide/magnesium hydroxide/simethicone Suspension 30 milliLiter(s) Oral every 4 hours PRN Dyspepsia  melatonin 3 milliGRAM(s) Oral at bedtime PRN Insomnia  ondansetron Injectable 4 milliGRAM(s) IV Push every 8 hours PRN Nausea and/or Vomiting          T(C): 36.9 (06-22 @ 04:00), Max: 37.3 (06-21 @ 20:22)   HR: 89   BP: 91/56   RR: 17   SpO2: 96%    PHYSICAL EXAM:    CONSTITUTIONAL: NAD, well-developed, well-groomed  EYES: PERRLA; conjunctiva and sclera clear  ENMT: Moist oral mucosa, no pharyngeal injection or exudates; normal dentition  RESPIRATORY: Normal respiratory effort; lungs are clear to auscultation bilaterally  CARDIOVASCULAR: Regular rate and rhythm, normal S1 and S2, no murmur/rub/gallop; No lower extremity edema; Peripheral pulses are 2+ bilaterally  ABDOMEN: Nontender to palpation, normoactive bowel sounds, no rebound/guarding; No hepatosplenomegaly  MUSCULOSKELETAL:  no clubbing or cyanosis of digits; no joint swelling or tenderness to palpation  PSYCH: A+O to person, place, and time; affect appropriate  NEUROLOGY: CN 2-12 are intact and symmetric; no gross sensory deficits       LABS:                        8.7    8.11  )-----------( 287      ( 22 Jun 2025 05:21 )             29.1      06-22    134[L]  |  96  |  91[H]  ----------------------------<  114[H]  4.8   |  26  |  1.74[H]    Ca    9.2      22 Jun 2025 05:21    TPro  6.7  /  Alb  2.8[L]  /  TBili  0.2  /  DBili  x   /  AST  19  /  ALT  13  /  AlkPhos  53  06-22       CAPILLARY BLOOD GLUCOSE      POCT Blood Glucose.: 113 mg/dL (22 Jun 2025 07:47)  POCT Blood Glucose.: 170 mg/dL (21 Jun 2025 21:10)  POCT Blood Glucose.: 178 mg/dL (21 Jun 2025 17:06)  POCT Blood Glucose.: 193 mg/dL (21 Jun 2025 11:36)      RADIOLOGY & ADDITIONAL TESTS:    Imaging Personally Reviewed:  Consultant(s) Notes Reviewed:    Care Discussed with Consultants/Other Providers:

## 2025-06-22 NOTE — PROGRESS NOTE ADULT - PROBLEM SELECTOR PLAN 4
CT chest with L supraclavicular, mediastinal and hilar lymphadenopathy  - d/w pt and family re concern for cancer, wish to proceed with further eval. IR c/s for L supraclavicular bx. --> planned for tomorrow or tuesday

## 2025-06-22 NOTE — EEG REPORT - NS EEG TEXT BOX
REPORT OF CONTINUOUS VIDEO EEG    [facility]: [address], [place] [address], Phone: [phone]  [facility]: [address] [place] [address], Phone: [phone]  [facility]: [address] [place] [address], Phone: [phone]    Patient Name: Brandy Waters   Age: 69 year, : 1955  Noe: -6  W  Referring Physician: -    Study Start: 2025	08:00     Study End:  2025  08:00    Study Duration: 24 hr     -------------------------------------------------------------------------------------------------------  EEG Recording Technique:  The patient underwent continuous Video-EEG monitoring, using Telemetry System hardware on the XLTek Digital System. EEG and video data were stored on a computer hard drive with important events saved in digital archive files. The material was reviewed by a physician (electroencephalographer / epileptologist) on a daily basis. Hayden and seizure detection algorithms were utilized and reviewed. An EEG Technician attended to the patient, and was available throughout daytime work hours.  The epilepsy center neurologist was available in person or on call 24-hours per day.    EEG Placement and Labeling of Electrodes:  The EEG was performed utilizing 20 channel referential EEG connections (coronal over temporal over parasagittal montage) using all standard 10-20 electrode placements with EKG, with additional electrodes placed in the inferior temporal region using the modified 10-10 montage electrode placements for elective admissions, or if deemed necessary. Recording was at a sampling rate of 256 samples per second per channel. Time synchronized digital video recording was done simultaneously with EEG recording. A low light infrared camera was used for low light recording.     -------------------------------------------------------------------------------------------------------  History: -  Patient is a 69y old  Female who presents with a chief complaint of Acute decompensated heart failure (2025 12:09)    Medication  TYLENOL      -------------------------------------------------------------------------------------------------------  Interpretation:    [[[Abbreviation Key:  PDR=alpha rhythm/posterior dominant rhythm. A-P=anterior posterior.  Amplitude: ‘very low’:<20; ‘low’:20-49; ‘medium’:; ‘high’:>150uV.  Persistence for periodic/rhythmic patterns (% of epoch) ‘rare’:<1%; ‘occasional’:1-10%; ‘frequent’:10-50%; ‘abundant’:50-90%; ‘continuous’:>90%.  Persistence for sporadic discharges: ‘rare’:<1/hr; ‘occasional’:1/min-1/hr; ‘frequent’:>1/min; ‘abundant’:>1/10 sec.  RPP=rhythmic and periodic patterns; GRDA=generalized rhythmic delta activity; FIRDA=frontal intermittent GRDA; LRDA=lateralized rhythmic delta activity; TIRDA=temporal intermittent rhythmic delta activity;  LPD=PLED=lateralized periodic discharges; GPD=generalized periodic discharges; BIPDs =bilateral independent periodic discharges; Mf=multifocal; SIRPDs=stimulus induced rhythmic, periodic, or ictal appearing discharges; BIRDs=brief potentially ictal rhythmic discharges >4 Hz, lasting .5-10s; PFA (paroxysmal bursts >13 Hz or =8 Hz <10s).  Modifiers: +F=with fast component; +S=with spike component; +R=with rhythmic component.  S-B=burst suppression pattern.  Max=maximal. N1-drowsy; N2-stage II sleep; N3-slow wave sleep. SSS/BETS=small sharp spikes/benign epileptiform transients of sleep. HV=hyperventilation; PS=photic stimulation]]]    FINDINGS:      Background:  SYMMETRY: symmetric  CONTINUOUS: continuous  PDR: symmetric, poorly-modulated 7-8 Hz activity, with amplitude to 40 uV, that attenuated to eye opening.  Low amplitude frontal beta noted in wakefulness.  REACTIVITY: present  VOLTAGE: normal, [defined typically between 20-150uV]  AP GRADIENT: present  BREACH: absent  OTHER: none    GENERALIZED SLOWING: present. Background is diffusely slow consisting of polymorphic delta theta activity up to 7-8  Hz    FOCAL SLOWING: none was present.    State Changes:   Drowsiness was characterized by fragmentation, attenuation, and slowing of the background activity.    N2 sleep transients with symmetric spindles and poorly formed K-complexes.      Sporadic Epileptiform Discharges:   None    Rhythmic and Periodic Patterns (RPPs):  None     Electrographic and Electroclinical seizures:  None    Other Clinical Events:  None    Activation Procedures:   Hyperventilation was not performed.    Photic stimulation was not performed.      Artifacts:  Intermittent myogenic and movement artifacts were noted.    ECG:  The heart rate on single channel ECG was predominantly between 70-80 BPM.  -------------------------------------------------------------------------------------------------------  EEG Classification:    Abnormal EEG in awake, drowsy and asleep states  1. Mild diffuse background slowing      -------------------------------------------------------------------------------------------------------  EEG Impression / Clinical Correlate:    Abnormal prolonged EEG study due to Mild diffuse cerebral dysfunction that is not specific in etiology    No epileptic discharges recorded.  No seizures recorded.      -------------------------------------------------------------------------------------------------------  ALEXA Houston  Attending Physician, [facility]    ------------------------------------  EEG Reading Room: [phone]  On Call Service After Hours: [phone]

## 2025-06-22 NOTE — PROVIDER CONTACT NOTE (OTHER) - ACTION/TREATMENT ORDERED:
ROLANDO Stern notified and aware. continue to monitor off BiPAP. care ongoing.
ACP Monika Montanez notified. IV tylenol ordered. Prior CTH with no signs of bleeding. Plan of care ongoing.
provider notified. RRT called, refer to rapid sheet. Care ongoing.
NP Nasima Marin notified and aware, patient placed back on bipap and medications rescheduled to be given later while patient is more awake. Plan of care ongoing
Provider at bedside. Entresto rescheduled as per provider. Care ongoing.

## 2025-06-22 NOTE — PROVIDER CONTACT NOTE (OTHER) - SITUATION
Pt more lethargic and confused this morning
pt took off BiPAP and is requesting to keep it off
Patient BP 85/52, C/o dizziness and headache.
Pt c/o 10/10 headache
Patient lethargic, and having AMS.

## 2025-06-22 NOTE — PROVIDER CONTACT NOTE (OTHER) - ASSESSMENT
Pt A&Ox3-4, able to make needs known. VSS. Patient confused about place. Patient more difficult to arouse.
Pt A&Ox3-4, took BiPAP off, asked to keep it off. Offered to call respiratory to readjust mask for comfort and pt requested to stay on nasal cannula for tonight. Pt saturating 94% on 2LNC
Pt A&Ox3-4, able to make needs known. No s/s of bleeding. Pt denies cp, denies SOB, denies pain.
Pt A&Ox4, able to make needs known. Pt asymptomatic. VSS, 2LNC. No s/s of bleeding. Pt denies cp, denies SOB. Pain began last night when EEG placed
Pt A&Ox2, disoriented to place and situation, remained on bipap at night. When trying to give morning meds patient more lethargic and difficult to arouse as well as confused

## 2025-06-22 NOTE — PROVIDER CONTACT NOTE (OTHER) - BACKGROUND
Dx:    -Heart failure:  s/p bumex gtt, s/p IV bumex: low dose entresto started per HF;following; s/p BIPAP, now on 2L
Pt admitted for ADHF exacerbation, RRT called 6/17 band 6/18 for altered mental status and mild hypercarbia, placed on bipap each time with improvement
Dx:    -Heart failure:  s/p bumex gtt, s/p IV bumex: s/p BIPAP, now on 2L; Entresto put on hold given MIKAELA 6/21
Dx:    -Heart failure:  s/p bumex gtt, s/p IV bumex: low dose entresto started per HF;following; s/p BIPAP, now on 2L
Pt admitted for ADHF, s/p RRT 6/17 for AMS and mild hypercarbia, improved with BiPAP during day

## 2025-06-22 NOTE — PROGRESS NOTE ADULT - SUBJECTIVE AND OBJECTIVE BOX
INTERVAL HPI/OVERNIGHT EVENTS: remains lethargic    diuretic on hold as per CHF servcie         MEDICATIONS  (STANDING):  aspirin  chewable 81 milliGRAM(s) Oral daily  atorvastatin 80 milliGRAM(s) Oral at bedtime  divalproex  milliGRAM(s) Oral two times a day  gabapentin 200 milliGRAM(s) Oral three times a day  insulin lispro (ADMELOG) corrective regimen sliding scale   SubCutaneous three times a day before meals  insulin lispro (ADMELOG) corrective regimen sliding scale   SubCutaneous at bedtime  OLANZapine 2.5 milliGRAM(s) Oral once  pantoprazole    Tablet 40 milliGRAM(s) Oral before breakfast  polyethylene glycol 3350 17 Gram(s) Oral at bedtime  sacubitril 49 mG/valsartan 51 mG 1 Tablet(s) Oral two times a day  senna 2 Tablet(s) Oral at bedtime    MEDICATIONS  (PRN):  acetaminophen     Tablet .. 650 milliGRAM(s) Oral every 6 hours PRN Temp greater or equal to 38C (100.4F), Mild Pain (1 - 3)  aluminum hydroxide/magnesium hydroxide/simethicone Suspension 30 milliLiter(s) Oral every 4 hours PRN Dyspepsia  melatonin 3 milliGRAM(s) Oral at bedtime PRN Insomnia  ondansetron Injectable 4 milliGRAM(s) IV Push every 8 hours PRN Nausea and/or Vomiting      Allergies    shellfish (Other; Short breath)  No Known Drug Allergies    Intolerances    lactose (Unknown)    ROS:  General: Pt denies recent weight loss/fever/chills    Neurological: denies numbness or  sensation loss    Cardiovascular: denies chest pain/palpitations/leg edema    Respiratory and Thorax: denies SOB/cough/wheezing    Gastrointestinal: denies abdominal pain/diarrhea/constipation/bloody stool    Genitourinary: denies urinary frequency/urgency/ dysuria    Musculoskeletal: denies joint pain or swelling, denies restricted motion    Hematologic: denies abnormal bleeding  	    	  	    		        	    	            Vital Signs Last 24 Hrs  T(C): 36.7 (2025 11:12), Max: 37.3 (2025 20:22)  T(F): 98 (2025 11:12), Max: 99.2 (2025 20:22)  HR: 92 (2025 11:12) (84 - 93)  BP: 109/69 (2025 11:12) (85/52 - 109/69)  BP(mean): --  RR: 18 (2025 11:12) (17 - 18)  SpO2: 98% (2025 11:12) (96% - 98%)    Parameters below as of 2025 11:12  Patient On (Oxygen Delivery Method): nasal cannula  O2 Flow (L/min): 2    Daily     Daily Weight in k.3 (2025 07:52)    06-21 @ 07:01  -  06 @ 07:00  --------------------------------------------------------  IN: 0 mL / OUT: 1450 mL / NET: -1450 mL      Physical Exam:    wwn female   no JVD  cor RRR  lung celar   ext no gunjan  neuro appears excessively lethargic ,fatigued      LABS:                        8.7    8.11  )-----------( 287      ( 2025 05:21 )             29.1     06-22    134[L]  |  96  |  91[H]  ----------------------------<  114[H]  4.8   |  26  |  1.74[H]    Ca    9.2      2025 05:21    TPro  6.7  /  Alb  2.8[L]  /  TBili  0.2  /  DBili  x   /  AST  19  /  ALT  13  /  AlkPhos  53  06-22      Urinalysis Basic - ( 2025 05:21 )    Color: x / Appearance: x / SG: x / pH: x  Gluc: 114 mg/dL / Ketone: x  / Bili: x / Urobili: x   Blood: x / Protein: x / Nitrite: x   Leuk Esterase: x / RBC: x / WBC x   Sq Epi: x / Non Sq Epi: x / Bacteria: x        RADIOLOGY & ADDITIONAL TESTS:    TELE:    EKG:

## 2025-06-22 NOTE — PROVIDER CONTACT NOTE (OTHER) - REASON
Pt more lethargic and confused this morning
Patient BP 85/52, C/o dizziness and headache.
Pt c/o 10/10 headache
Patient lethargic, and having AMS.
pt took off BiPAP and is requesting to keep it off

## 2025-06-23 DIAGNOSIS — R59.0 LOCALIZED ENLARGED LYMPH NODES: ICD-10-CM

## 2025-06-23 LAB
ANION GAP SERPL CALC-SCNC: 11 MMOL/L — SIGNIFICANT CHANGE UP (ref 5–17)
BUN SERPL-MCNC: 88 MG/DL — HIGH (ref 7–23)
CALCIUM SERPL-MCNC: 9.6 MG/DL — SIGNIFICANT CHANGE UP (ref 8.4–10.5)
CHLORIDE SERPL-SCNC: 96 MMOL/L — SIGNIFICANT CHANGE UP (ref 96–108)
CO2 SERPL-SCNC: 27 MMOL/L — SIGNIFICANT CHANGE UP (ref 22–31)
CREAT SERPL-MCNC: 1.36 MG/DL — HIGH (ref 0.5–1.3)
EGFR: 42 ML/MIN/1.73M2 — LOW
EGFR: 42 ML/MIN/1.73M2 — LOW
GLUCOSE BLDC GLUCOMTR-MCNC: 108 MG/DL — HIGH (ref 70–99)
GLUCOSE BLDC GLUCOMTR-MCNC: 148 MG/DL — HIGH (ref 70–99)
GLUCOSE BLDC GLUCOMTR-MCNC: 191 MG/DL — HIGH (ref 70–99)
GLUCOSE BLDC GLUCOMTR-MCNC: 248 MG/DL — HIGH (ref 70–99)
GLUCOSE BLDC GLUCOMTR-MCNC: 264 MG/DL — HIGH (ref 70–99)
GLUCOSE BLDC GLUCOMTR-MCNC: 291 MG/DL — HIGH (ref 70–99)
GLUCOSE SERPL-MCNC: 142 MG/DL — HIGH (ref 70–99)
HCT VFR BLD CALC: 30.8 % — LOW (ref 34.5–45)
HGB BLD-MCNC: 9 G/DL — LOW (ref 11.5–15.5)
INR BLD: 1.04 RATIO — SIGNIFICANT CHANGE UP (ref 0.85–1.16)
MCHC RBC-ENTMCNC: 26.9 PG — LOW (ref 27–34)
MCHC RBC-ENTMCNC: 29.2 G/DL — LOW (ref 32–36)
MCV RBC AUTO: 92.2 FL — SIGNIFICANT CHANGE UP (ref 80–100)
NRBC # BLD AUTO: 0 K/UL — SIGNIFICANT CHANGE UP (ref 0–0)
NRBC # FLD: 0 K/UL — SIGNIFICANT CHANGE UP (ref 0–0)
NRBC BLD AUTO-RTO: 0 /100 WBCS — SIGNIFICANT CHANGE UP (ref 0–0)
PLATELET # BLD AUTO: 320 K/UL — SIGNIFICANT CHANGE UP (ref 150–400)
PMV BLD: 10.9 FL — SIGNIFICANT CHANGE UP (ref 7–13)
POTASSIUM SERPL-MCNC: 5.1 MMOL/L — SIGNIFICANT CHANGE UP (ref 3.5–5.3)
POTASSIUM SERPL-SCNC: 5.1 MMOL/L — SIGNIFICANT CHANGE UP (ref 3.5–5.3)
PROTHROM AB SERPL-ACNC: 12 SEC — SIGNIFICANT CHANGE UP (ref 9.9–13.4)
RBC # BLD: 3.34 M/UL — LOW (ref 3.8–5.2)
RBC # FLD: 16.2 % — HIGH (ref 10.3–14.5)
SODIUM SERPL-SCNC: 134 MMOL/L — LOW (ref 135–145)
WBC # BLD: 6.72 K/UL — SIGNIFICANT CHANGE UP (ref 3.8–10.5)
WBC # FLD AUTO: 6.72 K/UL — SIGNIFICANT CHANGE UP (ref 3.8–10.5)

## 2025-06-23 PROCEDURE — 99233 SBSQ HOSP IP/OBS HIGH 50: CPT

## 2025-06-23 PROCEDURE — 99232 SBSQ HOSP IP/OBS MODERATE 35: CPT | Mod: GC

## 2025-06-23 PROCEDURE — 95718 EEG PHYS/QHP 2-12 HR W/VEEG: CPT

## 2025-06-23 PROCEDURE — 99233 SBSQ HOSP IP/OBS HIGH 50: CPT | Mod: GC

## 2025-06-23 PROCEDURE — 78830 RP LOCLZJ TUM SPECT W/CT 1: CPT | Mod: 26

## 2025-06-23 RX ORDER — METOPROLOL SUCCINATE 50 MG/1
25 TABLET, EXTENDED RELEASE ORAL DAILY
Refills: 0 | Status: DISCONTINUED | OUTPATIENT
Start: 2025-06-23 | End: 2025-07-02

## 2025-06-23 RX ORDER — ALPRAZOLAM 0.5 MG
0.25 TABLET, EXTENDED RELEASE 24 HR ORAL ONCE
Refills: 0 | Status: DISCONTINUED | OUTPATIENT
Start: 2025-06-23 | End: 2025-06-24

## 2025-06-23 RX ORDER — SACUBITRIL AND VALSARTAN 6; 6 MG/1; MG/1
1 PELLET ORAL
Refills: 0 | Status: DISCONTINUED | OUTPATIENT
Start: 2025-06-23 | End: 2025-07-07

## 2025-06-23 RX ORDER — HEPARIN SODIUM 1000 [USP'U]/ML
5000 INJECTION INTRAVENOUS; SUBCUTANEOUS EVERY 8 HOURS
Refills: 0 | Status: DISCONTINUED | OUTPATIENT
Start: 2025-06-23 | End: 2025-06-23

## 2025-06-23 RX ADMIN — SACUBITRIL AND VALSARTAN 1 TABLET(S): 6; 6 PELLET ORAL at 18:31

## 2025-06-23 RX ADMIN — ATORVASTATIN CALCIUM 80 MILLIGRAM(S): 80 TABLET, FILM COATED ORAL at 21:29

## 2025-06-23 RX ADMIN — GABAPENTIN 200 MILLIGRAM(S): 400 CAPSULE ORAL at 21:29

## 2025-06-23 RX ADMIN — HEPARIN SODIUM 5000 UNIT(S): 1000 INJECTION INTRAVENOUS; SUBCUTANEOUS at 14:11

## 2025-06-23 RX ADMIN — Medication 500 MILLIGRAM(S): at 06:12

## 2025-06-23 RX ADMIN — Medication 500 MILLIGRAM(S): at 18:31

## 2025-06-23 RX ADMIN — GABAPENTIN 200 MILLIGRAM(S): 400 CAPSULE ORAL at 06:12

## 2025-06-23 RX ADMIN — POLYETHYLENE GLYCOL 3350 17 GRAM(S): 17 POWDER, FOR SOLUTION ORAL at 21:29

## 2025-06-23 RX ADMIN — HEPARIN SODIUM 5000 UNIT(S): 1000 INJECTION INTRAVENOUS; SUBCUTANEOUS at 21:29

## 2025-06-23 RX ADMIN — Medication 81 MILLIGRAM(S): at 14:11

## 2025-06-23 RX ADMIN — SACUBITRIL AND VALSARTAN 1 TABLET(S): 6; 6 PELLET ORAL at 06:13

## 2025-06-23 RX ADMIN — INSULIN LISPRO 2: 100 INJECTION, SOLUTION INTRAVENOUS; SUBCUTANEOUS at 18:40

## 2025-06-23 RX ADMIN — GABAPENTIN 200 MILLIGRAM(S): 400 CAPSULE ORAL at 14:10

## 2025-06-23 RX ADMIN — Medication 40 MILLIGRAM(S): at 06:13

## 2025-06-23 RX ADMIN — METOPROLOL SUCCINATE 25 MILLIGRAM(S): 50 TABLET, EXTENDED RELEASE ORAL at 14:10

## 2025-06-23 RX ADMIN — Medication 2 TABLET(S): at 21:28

## 2025-06-23 NOTE — PROGRESS NOTE ADULT - ASSESSMENT
69F with PMHx DM, HTN, VERONIKA, sciatica, peripheral neuropathy, seizures initially presenting to [facility] on 6/10/2025 complaining of progressively worsening shortness of breath for 1 month, admitted for acute decompensated heart failure exacerbation. She was receiving lasix IV for diuresis & Rocephin for PNA. Transferred to CCU at outside hospital, and subsequently transferred to [facility] CICU. Patient was on Bumex drip now discontinued with output of 2L since admitted.     Pulmonary consulted due to hypoxemia with pleural effusions. As per HF team, this is HF of unclear cause with Ef of 30-35% with no prior TTE on chart.     #HF on unclear cause  #Bilateral pleural effusions: L s/p thora 6/19 with exudate lymphocytic as per serum-pleural albumin and protein gradients (used on pts on diuresis to which Albumin gradient was 0.7 and Protein 2.4 (cut offs of more than 1.2 and 2.5 respectively for Albumin and Protein)   #VERONIKA with CPAP at home  - Patient with recurrent events of somnolence likely due to hypercarbia that improve with NIV   - s/p Thoracentesis L : follow up pleural cytology and flow   - CT chest with supraclav and mediastinal LN  - Pending LN biopsy  - Please assess tolerance of patient to lay flat for upcoming procedures  - Repeat VBG to better titrate current BIPAP   - Please obtain bedside PFTs + pulse night oxymetry with Respiratory Therapy   - Aspiration precautions, can start Albuterol nebs every 6h for airway clearance with Acapella  - Strict I/Os with goal of net negative with dosing as per primary team    - Ischemic work up as per Cards

## 2025-06-23 NOTE — PROGRESS NOTE ADULT - ATTENDING COMMENTS
68 yo F with PMHx DM, HTN, VERONIKA, sciatica, peripheral neuropathy, seizures initially presenting to [facility] on 6/10/2025 complaining of progressively worsening shortness of breath for 1 month, admitted for acute decompensated heart failure exacerbation.  Pulmonary consulted due to hypoxemia with pleural effusions. As per HF team, this is HF of unclear cause with EF of 30-35% w/ dyskinetic apex and with no prior TTE on chart.  Remains on 1-2L NC. Waxing/waning mental status of unclear etiology.   s/p LEFT thoracentesis 6/19 with removal of 600cc    #HF, etiology unknown  #Bilateral pleural effusions  #Volume overload  #Hypercapneic/Hypoxic resp failure  #MIKAELA  #Encephalopathy  #VERONIKA    Recommend:   - pleural fluid c/w exudate and lymph predominant, await cytology  - ordered for brain and cardiac MRI to evaluate for AMS and amyloidosis, respectively, however unable to perform d/t anxiety  - continue BIPAP overnight and when napping given persistent hypercapnea. Pls obtain VBG   - Neurology following for AMS  - incentive spirometry  - PT eval, OOB to chair as tolerated .  - pendign lymph node biopsy

## 2025-06-23 NOTE — PROGRESS NOTE ADULT - SUBJECTIVE AND OBJECTIVE BOX
Interval Events:      REVIEW OF SYSTEMS:  Negative except as documented above.      OBJECTIVE:  ICU Vital Signs Last 24 Hrs  T(C): 37 (22 Jun 2025 20:13), Max: 37 (22 Jun 2025 20:13)  T(F): 98.6 (22 Jun 2025 20:13), Max: 98.6 (22 Jun 2025 20:13)  HR: 86 (23 Jun 2025 06:35) (86 - 97)  BP: 92/54 (23 Jun 2025 04:00) (92/54 - 109/69)  BP(mean): --  ABP: --  ABP(mean): --  RR: 17 (23 Jun 2025 04:00) (17 - 18)  SpO2: 99% (23 Jun 2025 06:35) (91% - 100%)    O2 Parameters below as of 23 Jun 2025 04:00  Patient On (Oxygen Delivery Method): nasal cannula              06-22 @ 07:01  -  06-23 @ 07:00  --------------------------------------------------------  IN: 880 mL / OUT: 1100 mL / NET: -220 mL      CAPILLARY BLOOD GLUCOSE      POCT Blood Glucose.: 108 mg/dL (23 Jun 2025 07:26)      PHYSICAL EXAM:  General: NAD  HEENT:  EOMI, sclera anicteric, moist mucus membranes  Neck: supple  Cardiovascular: RRR  Respiratory: CTAB, no wheezes, crackles, or rhonci  Abdomen: soft, nontender  Extremities: warm and well perfused, no edema, no clubbing  Skin: no rashes  Neurological: no focal deficits    HOSPITAL MEDICATIONS:  MEDICATIONS  (STANDING):  aspirin  chewable 81 milliGRAM(s) Oral daily  atorvastatin 80 milliGRAM(s) Oral at bedtime  divalproex  milliGRAM(s) Oral two times a day  gabapentin 200 milliGRAM(s) Oral three times a day  heparin   Injectable 5000 Unit(s) SubCutaneous every 8 hours  insulin lispro (ADMELOG) corrective regimen sliding scale   SubCutaneous three times a day before meals  insulin lispro (ADMELOG) corrective regimen sliding scale   SubCutaneous at bedtime  OLANZapine 2.5 milliGRAM(s) Oral once  pantoprazole    Tablet 40 milliGRAM(s) Oral before breakfast  polyethylene glycol 3350 17 Gram(s) Oral at bedtime  sacubitril 49 mG/valsartan 51 mG 1 Tablet(s) Oral two times a day  senna 2 Tablet(s) Oral at bedtime    MEDICATIONS  (PRN):  acetaminophen     Tablet .. 650 milliGRAM(s) Oral every 6 hours PRN Temp greater or equal to 38C (100.4F), Mild Pain (1 - 3)  aluminum hydroxide/magnesium hydroxide/simethicone Suspension 30 milliLiter(s) Oral every 4 hours PRN Dyspepsia  melatonin 3 milliGRAM(s) Oral at bedtime PRN Insomnia  ondansetron Injectable 4 milliGRAM(s) IV Push every 8 hours PRN Nausea and/or Vomiting      LABS:                        8.7    8.11  )-----------( 287      ( 22 Jun 2025 05:21 )             29.1     Hgb Trend: 8.7<--, 8.7<--, 9.3<--, 9.4<--, 9.1<--  06-22    134[L]  |  96  |  91[H]  ----------------------------<  114[H]  4.8   |  26  |  1.74[H]    Ca    9.2      22 Jun 2025 05:21    TPro  6.7  /  Alb  2.8[L]  /  TBili  0.2  /  DBili  x   /  AST  19  /  ALT  13  /  AlkPhos  53  06-22    Creatinine Trend: 1.74<--, 1.70<--, 1.54<--, 1.48<--, 1.48<--, 1.70<--    Urinalysis Basic - ( 22 Jun 2025 05:21 )    Color: x / Appearance: x / SG: x / pH: x  Gluc: 114 mg/dL / Ketone: x  / Bili: x / Urobili: x   Blood: x / Protein: x / Nitrite: x   Leuk Esterase: x / RBC: x / WBC x   Sq Epi: x / Non Sq Epi: x / Bacteria: x        Venous Blood Gas:  06-22 @ 05:29  7.32/64/33/33/53.4  VBG Lactate: --      MICROBIOLOGY:       RADIOLOGY:  [x] Reviewed and interpreted by me

## 2025-06-23 NOTE — PROGRESS NOTE ADULT - SUBJECTIVE AND OBJECTIVE BOX
Andre Reyes, M.D.  Office: [phone]  Available thru [employer] Teams     Patient is a 69y old  Female who presents with a chief complaint of Acute decompensated heart failure (23 Jun 2025 08:51)          SUBJECTIVE / OVERNIGHT EVENTS:    No acute overnight events.        MEDICATIONS  (STANDING):  aspirin  chewable 81 milliGRAM(s) Oral daily  atorvastatin 80 milliGRAM(s) Oral at bedtime  divalproex  milliGRAM(s) Oral two times a day  gabapentin 200 milliGRAM(s) Oral three times a day  heparin   Injectable 5000 Unit(s) SubCutaneous every 8 hours  insulin lispro (ADMELOG) corrective regimen sliding scale   SubCutaneous three times a day before meals  insulin lispro (ADMELOG) corrective regimen sliding scale   SubCutaneous at bedtime  OLANZapine 2.5 milliGRAM(s) Oral once  pantoprazole    Tablet 40 milliGRAM(s) Oral before breakfast  polyethylene glycol 3350 17 Gram(s) Oral at bedtime  sacubitril 49 mG/valsartan 51 mG 1 Tablet(s) Oral two times a day  senna 2 Tablet(s) Oral at bedtime    MEDICATIONS  (PRN):  acetaminophen     Tablet .. 650 milliGRAM(s) Oral every 6 hours PRN Temp greater or equal to 38C (100.4F), Mild Pain (1 - 3)  aluminum hydroxide/magnesium hydroxide/simethicone Suspension 30 milliLiter(s) Oral every 4 hours PRN Dyspepsia  melatonin 3 milliGRAM(s) Oral at bedtime PRN Insomnia  ondansetron Injectable 4 milliGRAM(s) IV Push every 8 hours PRN Nausea and/or Vomiting          T(C): 37 (06-22 @ 20:13), Max: 37 (06-22 @ 20:13)   HR: 94   BP: 92/54   RR: 17   SpO2: 93%    PHYSICAL EXAM:    CONSTITUTIONAL: NAD, well-developed, well-groomed  EYES: PERRLA; conjunctiva and sclera clear  ENMT: Moist oral mucosa, no pharyngeal injection or exudates; normal dentition  RESPIRATORY: Normal respiratory effort; lungs are clear to auscultation bilaterally  CARDIOVASCULAR: Regular rate and rhythm, normal S1 and S2, no murmur/rub/gallop; No lower extremity edema; Peripheral pulses are 2+ bilaterally  ABDOMEN: Nontender to palpation, normoactive bowel sounds, no rebound/guarding; No hepatosplenomegaly  MUSCULOSKELETAL:  no clubbing or cyanosis of digits; no joint swelling or tenderness to palpation  PSYCH: A+O to person, place, and time; affect appropriate  NEUROLOGY: CN 2-12 are intact and symmetric; no gross sensory deficits       LABS:                        9.0    6.72  )-----------( 320      ( 23 Jun 2025 10:29 )             30.8      06-22    134[L]  |  96  |  91[H]  ----------------------------<  114[H]  4.8   |  26  |  1.74[H]    Ca    9.2      22 Jun 2025 05:21    TPro  6.7  /  Alb  2.8[L]  /  TBili  0.2  /  DBili  x   /  AST  19  /  ALT  13  /  AlkPhos  53  06-22       CAPILLARY BLOOD GLUCOSE      POCT Blood Glucose.: 108 mg/dL (23 Jun 2025 07:26)  POCT Blood Glucose.: 132 mg/dL (22 Jun 2025 21:15)  POCT Blood Glucose.: 243 mg/dL (22 Jun 2025 17:12)  POCT Blood Glucose.: 110 mg/dL (22 Jun 2025 11:31)      RADIOLOGY & ADDITIONAL TESTS:    Imaging Personally Reviewed:  Consultant(s) Notes Reviewed:    Care Discussed with Consultants/Other Providers:   Andre Reyes, M.D.  Office: [phone]  Available thru [employer] Teams     Patient is a 69y old  Female who presents with a chief complaint of Acute decompensated heart failure (23 Jun 2025 08:51)          SUBJECTIVE / OVERNIGHT EVENTS:    No acute overnight events.  No new complaints today.        MEDICATIONS  (STANDING):  aspirin  chewable 81 milliGRAM(s) Oral daily  atorvastatin 80 milliGRAM(s) Oral at bedtime  divalproex  milliGRAM(s) Oral two times a day  gabapentin 200 milliGRAM(s) Oral three times a day  heparin   Injectable 5000 Unit(s) SubCutaneous every 8 hours  insulin lispro (ADMELOG) corrective regimen sliding scale   SubCutaneous three times a day before meals  insulin lispro (ADMELOG) corrective regimen sliding scale   SubCutaneous at bedtime  OLANZapine 2.5 milliGRAM(s) Oral once  pantoprazole    Tablet 40 milliGRAM(s) Oral before breakfast  polyethylene glycol 3350 17 Gram(s) Oral at bedtime  sacubitril 49 mG/valsartan 51 mG 1 Tablet(s) Oral two times a day  senna 2 Tablet(s) Oral at bedtime    MEDICATIONS  (PRN):  acetaminophen     Tablet .. 650 milliGRAM(s) Oral every 6 hours PRN Temp greater or equal to 38C (100.4F), Mild Pain (1 - 3)  aluminum hydroxide/magnesium hydroxide/simethicone Suspension 30 milliLiter(s) Oral every 4 hours PRN Dyspepsia  melatonin 3 milliGRAM(s) Oral at bedtime PRN Insomnia  ondansetron Injectable 4 milliGRAM(s) IV Push every 8 hours PRN Nausea and/or Vomiting          T(C): 37 (06-22 @ 20:13), Max: 37 (06-22 @ 20:13)   HR: 94   BP: 92/54   RR: 17   SpO2: 93%    PHYSICAL EXAM:    CONSTITUTIONAL: NAD, well-developed, well-groomed  EYES: PERRLA; conjunctiva and sclera clear  ENMT: Moist oral mucosa, no pharyngeal injection or exudates; normal dentition  RESPIRATORY: Normal respiratory effort; lungs are clear to auscultation bilaterally  CARDIOVASCULAR: Regular rate and rhythm, normal S1 and S2, no murmur/rub/gallop; 1+ lower extremity edema; Peripheral pulses are 2+ bilaterally  ABDOMEN: Nontender to palpation, normoactive bowel sounds, no rebound/guarding; No hepatosplenomegaly  MUSCULOSKELETAL:  no clubbing or cyanosis of digits; no joint swelling or tenderness to palpation  PSYCH: A+O to person, place, and time; affect appropriate  NEUROLOGY: CN 2-12 are intact and symmetric; no gross sensory deficits       LABS:                        9.0    6.72  )-----------( 320      ( 23 Jun 2025 10:29 )             30.8      06-22    134[L]  |  96  |  91[H]  ----------------------------<  114[H]  4.8   |  26  |  1.74[H]    Ca    9.2      22 Jun 2025 05:21    TPro  6.7  /  Alb  2.8[L]  /  TBili  0.2  /  DBili  x   /  AST  19  /  ALT  13  /  AlkPhos  53  06-22       CAPILLARY BLOOD GLUCOSE      POCT Blood Glucose.: 108 mg/dL (23 Jun 2025 07:26)  POCT Blood Glucose.: 132 mg/dL (22 Jun 2025 21:15)  POCT Blood Glucose.: 243 mg/dL (22 Jun 2025 17:12)  POCT Blood Glucose.: 110 mg/dL (22 Jun 2025 11:31)      RADIOLOGY & ADDITIONAL TESTS:    Imaging Personally Reviewed:  Consultant(s) Notes Reviewed:    Care Discussed with Consultants/Other Providers:

## 2025-06-23 NOTE — PROGRESS NOTE ADULT - PROBLEM SELECTOR PLAN 4
CT chest with L supraclavicular, mediastinal and hilar lymphadenopathy  - d/w pt and family re concern for cancer, wish to proceed with further eval.   - IR c/s for L supraclavicular bx. --> planned for tomorrow

## 2025-06-23 NOTE — PROGRESS NOTE ADULT - PROBLEM SELECTOR PLAN 1
- Etiology: unclear. Pt states she has NO prior h/o cardiomyopathy. She has +WMA on TTE, abnormal EKG and risk fcators for CAD. She also had a recent CT chest with mediastinal lymphadenopathy concerning for sarcoid.   - TTE outside hospital show LVEF 20% but TTE 6/14 here with LVEF 30-35%.  - Please dc heparin gtt, no e/o LV thrombus on repeat TTE     - Will obtain records from [facility]     - Will need eventual LHC/RHC when more euvolemic & stable renal function. Can try for today vs Mon     - Amyloid w/u initiated (free light chains & immunofixation) - elevated lambda/clair, normal ratio, no monoclonal bands.      - Would order PYP and cardiac MRI to eval for infiltraive processes  - GDMT:     - ARNI: Decr Entresto 24/26 mg BID.     - BB: start Toprol 25 daily      - Plan to add MRA on Mondya and SGLT2i once all procedures done  - Diuretics: stop Bumex      - Strict I/Os and daily weights     - Appreciate pulm recs, s/p thora 6/19  - K > 4 & Mg > 2

## 2025-06-23 NOTE — PROGRESS NOTE ADULT - PROBLEM SELECTOR PLAN 2
Is This A New Presentation, Or A Follow-Up?: Cysts On 6/14/25, TTE with EF 30-35% with wall motion abnormalities. Diuresed with Bumex drip earlier in hospitalization.   - Heparin drip discontinued - no LV thrombus on repeat TTE  - c/w entresto  - diuretics on hold given MIKAELA  - f/u HF recs  - pending cMRI, (planned for tomorrow at 1pm) will need xanax before  - Will eventually need right and LHC

## 2025-06-23 NOTE — PROGRESS NOTE ADULT - PROBLEM SELECTOR PLAN 3
MIKAELA on CKD3b  Renal function much improved  - diuretics on hold  - Creatinine Trend: 1.36<--, 1.74<--, 1.70<--, 1.54<--, 1.48<--, 1.48<--

## 2025-06-23 NOTE — PROGRESS NOTE ADULT - ATTENDING COMMENTS
68 YO F with a history of HTN, bilateral CTS spinal stenosis s/p laminectomy, peripheral neuropathy who presented to [facility] with signs of heart failure and found to have LV dysfunction with LVEF 30-35%. She has had multiple episodes of altered mental status of unclear etiology.    She appears euvolemic and awaiting workup of her cardiomyopathy. There is high suspicion for cardiac amyloid.    # Acute systolic heart failure   -Etiology: high suspicion for amyloid. K/L ratio normal. Obtain NM Amyloid scan and cMRI. Possible eventual R/LH  -GDMT: reduce entresto to 24-26 mg BID and start metoprolol succinate 25 mg daily. eventual MRA?SGLT2i  -Diuretics: euvolemic off diuretics  -Device: premature

## 2025-06-23 NOTE — PROGRESS NOTE ADULT - PROBLEM SELECTOR PLAN 6
- Divalproex 500 mg BID    EEG did not show any epileptic form activity. it did show mild diffuse slowing

## 2025-06-23 NOTE — PROGRESS NOTE ADULT - ASSESSMENT
69F with PMH of HTN, DM2, VERONIKA, sciatica, peripheral neuropathy, hx of seizure initially presented to [facility] on 6/10/25 with progressively worsening SOB, admitted for ADHF and possible pneumonia. Received IV diuretics (lasix) and antibiotics for pneumonia and was transferred to CCU. Ultimately transferred to [facility] CICU for further management. On arrival, she was on BIPAP & started on IV diuresis for respiratory distress & significant pulmonary edema. She has since been downgraded to telemetry & is on 2L NC. She has responded well to diuresis and GDMT. S/p thoracentesis 6/19. Her IVC today is small and collapsible. She is pending L/RHC for newly dx'ed CMP and cMRI to rule out infiltrative disease (chest CT with significant mediastinal LAD-?sarcoid vs malignancy).   Medicine and neurology to work up transient episodes of AMS.     Cardiac Studies:  - TTE 6/14/25: LVEF 30-35%, nml RV, TAPSE 2.0cm, nml LA/RA, no AR, no MR, trace TR, IVC nml 1.4cm  - TTE OSH: Reported LVEF 20% - need official records

## 2025-06-23 NOTE — PROGRESS NOTE ADULT - SUBJECTIVE AND OBJECTIVE BOX
NEUROLOGY FOLLOW-UP CONSULT NOTE    RFC: Episode of AMS    Interval history: No acute neurologic events overnight. MRI brain completed, without evidence of acute intracranial pathology. Patient connected to vEEG at bedside today. EEG with mild diffuse background slowing and mild nonspecific cerebral dysfunction, however no seizures or epileptic discharges recorded. Today, patient evaluated by neurology at bedside, seen sitting comfortably in chair without NC, reports she feels well. Endorses that she feels closer to her neurological baseline without more known fluctuations in mental status. No focal deficits on exam.     Meds:  MEDICATIONS  (STANDING):  aspirin  chewable 81 milliGRAM(s) Oral daily  atorvastatin 80 milliGRAM(s) Oral at bedtime  divalproex  milliGRAM(s) Oral two times a day  gabapentin 200 milliGRAM(s) Oral three times a day  heparin   Injectable 5000 Unit(s) SubCutaneous every 8 hours  insulin lispro (ADMELOG) corrective regimen sliding scale   SubCutaneous three times a day before meals  insulin lispro (ADMELOG) corrective regimen sliding scale   SubCutaneous at bedtime  pantoprazole    Tablet 40 milliGRAM(s) Oral before breakfast  polyethylene glycol 3350 17 Gram(s) Oral at bedtime  sacubitril 49 mG/valsartan 51 mG 1 Tablet(s) Oral two times a day  senna 2 Tablet(s) Oral at bedtime    MEDICATIONS  (PRN):  acetaminophen     Tablet .. 650 milliGRAM(s) Oral every 6 hours PRN Temp greater or equal to 38C (100.4F), Mild Pain (1 - 3)  ALPRAZolam 0.25 milliGRAM(s) Oral once PRN 20 minute before MRI  aluminum hydroxide/magnesium hydroxide/simethicone Suspension 30 milliLiter(s) Oral every 4 hours PRN Dyspepsia  melatonin 3 milliGRAM(s) Oral at bedtime PRN Insomnia  ondansetron Injectable 4 milliGRAM(s) IV Push every 8 hours PRN Nausea and/or Vomiting      Allergies:  lactose (Unknown)  shellfish (Other; Short breath)  No Known Drug Allergies      ROS: All systems negative except as documented in Interval history    O:  T(C): 36.6 (06-23-25 @ 11:21), Max: 37 (06-22-25 @ 20:13)  HR: 67 (06-23-25 @ 11:21) (67 - 97)  BP: 133/78 (06-23-25 @ 11:21) (92/54 - 133/78)  RR: 18 (06-23-25 @ 11:21) (17 - 18)  SpO2: 96% (06-23-25 @ 11:21) (91% - 100%)    Focused neurologic exam:  MS Qian DOVER x2.5 (reports date as June 2025 only), speech with increased latency and bradyphrenia but otherwise fluent, rep/naming intact, follows commands  CN - Pupils pinpoint and equal b/l, reactive to light b/l, EOMI, VFF, face sens/str/hearing WNL b/l, tongue/palate midline, trap 5/5 b/l  Motor - Normal bulk/tone, strength BUE's 4+/5, raises BLE's antigravity   Sens - LT intact all  DTR's - Deferred due to patient comfort  Coord - FtN intact b/l. BUE intention tremor  Gait and station - Due to fall risk/safety concerns did not assess    Pertinent labs/studies:  .  LABS:                         9.0    6.72  )-----------( 320      ( 23 Jun 2025 10:29 )             30.8     06-23    134[L]  |  96  |  88[H]  ----------------------------<  142[H]  5.1   |  27  |  1.36[H]    Ca    9.6      23 Jun 2025 10:29    TPro  6.7  /  Alb  2.8[L]  /  TBili  0.2  /  DBili  x   /  AST  19  /  ALT  13  /  AlkPhos  53  06-22    PT/INR - ( 23 Jun 2025 10:29 )   PT: 12.0 sec;   INR: 1.04 ratio           Urinalysis Basic - ( 23 Jun 2025 10:29 )    Color: x / Appearance: x / SG: x / pH: x  Gluc: 142 mg/dL / Ketone: x  / Bili: x / Urobili: x   Blood: x / Protein: x / Nitrite: x   Leuk Esterase: x / RBC: x / WBC x   Sq Epi: x / Non Sq Epi: x / Bacteria: x        RADIOLOGY, EKG & ADDITIONAL TESTS: Reviewed.     vEEG Report 6/22 - 6/23  Study Duration: 23 hr 46 min  -------------------------------------------------------------------------------------------------------  EEG Classification:    Abnormal EEG in awake, drowsy and asleep states  1. Mild diffuse background slowing  -------------------------------------------------------------------------------------------------------  EEG Impression / Clinical Correlate:    Abnormal prolonged EEG study due to Mild diffuse cerebral dysfunction that is not specific in etiology    No epileptic discharges recorded.  No seizures recorded.      < from: MR Head No Cont (06.18.25 @ 18:57) >    IMPRESSION:  No acute infarct, acute intracranial hemorrhage, or mass effect.    --- End of Report ---      < end of copied text >

## 2025-06-23 NOTE — EEG REPORT - NS EEG TEXT BOX
REPORT OF CONTINUOUS VIDEO EEG    [facility]: [address], [place] [address], Phone: [phone]  [facility]: [address] [place] [address], Phone: [phone]  [facility]: [address] [place] [address], Phone: [phone]    Patient Name: Brandy Waters   Age: 69 year, : 1955  Noe: -6  W  Referring Physician: -    Study Start: 2025	08:00     Study End:  2025  08:00    Study Duration: 23 hr 46 min    -------------------------------------------------------------------------------------------------------  EEG Recording Technique:  The patient underwent continuous Video-EEG monitoring, using Telemetry System hardware on the XLTek Digital System. EEG and video data were stored on a computer hard drive with important events saved in digital archive files. The material was reviewed by a physician (electroencephalographer / epileptologist) on a daily basis. Hayden and seizure detection algorithms were utilized and reviewed. An EEG Technician attended to the patient, and was available throughout daytime work hours.  The epilepsy center neurologist was available in person or on call 24-hours per day.    EEG Placement and Labeling of Electrodes:  The EEG was performed utilizing 20 channel referential EEG connections (coronal over temporal over parasagittal montage) using all standard 10-20 electrode placements with EKG, with additional electrodes placed in the inferior temporal region using the modified 10-10 montage electrode placements for elective admissions, or if deemed necessary. Recording was at a sampling rate of 256 samples per second per channel. Time synchronized digital video recording was done simultaneously with EEG recording. A low light infrared camera was used for low light recording.     -------------------------------------------------------------------------------------------------------  History: -  Patient is a 69y old  Female who presents with a chief complaint of Acute decompensated heart failure (2025 12:09)    Medication  TYLENOL      -------------------------------------------------------------------------------------------------------  Interpretation:    [[[Abbreviation Key:  PDR=alpha rhythm/posterior dominant rhythm. A-P=anterior posterior.  Amplitude: ‘very low’:<20; ‘low’:20-49; ‘medium’:; ‘high’:>150uV.  Persistence for periodic/rhythmic patterns (% of epoch) ‘rare’:<1%; ‘occasional’:1-10%; ‘frequent’:10-50%; ‘abundant’:50-90%; ‘continuous’:>90%.  Persistence for sporadic discharges: ‘rare’:<1/hr; ‘occasional’:1/min-1/hr; ‘frequent’:>1/min; ‘abundant’:>1/10 sec.  RPP=rhythmic and periodic patterns; GRDA=generalized rhythmic delta activity; FIRDA=frontal intermittent GRDA; LRDA=lateralized rhythmic delta activity; TIRDA=temporal intermittent rhythmic delta activity;  LPD=PLED=lateralized periodic discharges; GPD=generalized periodic discharges; BIPDs =bilateral independent periodic discharges; Mf=multifocal; SIRPDs=stimulus induced rhythmic, periodic, or ictal appearing discharges; BIRDs=brief potentially ictal rhythmic discharges >4 Hz, lasting .5-10s; PFA (paroxysmal bursts >13 Hz or =8 Hz <10s).  Modifiers: +F=with fast component; +S=with spike component; +R=with rhythmic component.  S-B=burst suppression pattern.  Max=maximal. N1-drowsy; N2-stage II sleep; N3-slow wave sleep. SSS/BETS=small sharp spikes/benign epileptiform transients of sleep. HV=hyperventilation; PS=photic stimulation]]]    FINDINGS:      Background:  SYMMETRY: symmetric  CONTINUOUS: continuous  PDR: symmetric, poorly-modulated 7-8 Hz activity, with amplitude to 40 uV, that attenuated to eye opening.  Low amplitude frontal beta noted in wakefulness.  REACTIVITY: present  VOLTAGE: normal, [defined typically between 20-150uV]  AP GRADIENT: present  BREACH: absent  OTHER: none    GENERALIZED SLOWING: present. Background is diffusely slow consisting of polymorphic delta theta activity up to 7-8  Hz    FOCAL SLOWING: none was present.    State Changes:   Drowsiness was characterized by fragmentation, attenuation, and slowing of the background activity.    N2 sleep transients with symmetric spindles and poorly formed K-complexes.      Sporadic Epileptiform Discharges:   None    Rhythmic and Periodic Patterns (RPPs):  None     Electrographic and Electroclinical seizures:  None    Other Clinical Events:  None    Activation Procedures:   Hyperventilation was not performed.    Photic stimulation was not performed.      Artifacts:  Intermittent myogenic and movement artifacts were noted.    ECG:  The heart rate on single channel ECG was predominantly between 70-80 BPM.  -------------------------------------------------------------------------------------------------------  EEG Classification:    Abnormal EEG in awake, drowsy and asleep states  1. Mild diffuse background slowing      -------------------------------------------------------------------------------------------------------  EEG Impression / Clinical Correlate:    Abnormal prolonged EEG study due to Mild diffuse cerebral dysfunction that is not specific in etiology    No epileptic discharges recorded.  No seizures recorded.      -------------------------------------------------------------------------------------------------------  ALEXA Houston  Attending Physician, [facility]    ------------------------------------  EEG Reading Room: [phone]  On Call Service After Hours: [phone]               REPORT OF CONTINUOUS VIDEO EEG    [facility]: [address], [place] [address], Phone: [phone]  [facility]: [address] [place] [address], Phone: [phone]  [facility]: [address] [place] [address], Phone: [phone]    Patient Name: Brandy Waters   Age: 69 year, : 1955  Noe: -6  W  Referring Physician: -    Study Start: 2025	08:00     Study End:  2025  12:42   Study Duration: 28 hr 22 min    -------------------------------------------------------------------------------------------------------  EEG Recording Technique:  The patient underwent continuous Video-EEG monitoring, using Telemetry System hardware on the XLTek Digital System. EEG and video data were stored on a computer hard drive with important events saved in digital archive files. The material was reviewed by a physician (electroencephalographer / epileptologist) on a daily basis. Hayden and seizure detection algorithms were utilized and reviewed. An EEG Technician attended to the patient, and was available throughout daytime work hours.  The epilepsy center neurologist was available in person or on call 24-hours per day.    EEG Placement and Labeling of Electrodes:  The EEG was performed utilizing 20 channel referential EEG connections (coronal over temporal over parasagittal montage) using all standard 10-20 electrode placements with EKG, with additional electrodes placed in the inferior temporal region using the modified 10-10 montage electrode placements for elective admissions, or if deemed necessary. Recording was at a sampling rate of 256 samples per second per channel. Time synchronized digital video recording was done simultaneously with EEG recording. A low light infrared camera was used for low light recording.     -------------------------------------------------------------------------------------------------------  History: -  Patient is a 69y old  Female who presents with a chief complaint of Acute decompensated heart failure (2025 12:09)    Medication  TYLENOL      -------------------------------------------------------------------------------------------------------  Interpretation:    [[[Abbreviation Key:  PDR=alpha rhythm/posterior dominant rhythm. A-P=anterior posterior.  Amplitude: ‘very low’:<20; ‘low’:20-49; ‘medium’:; ‘high’:>150uV.  Persistence for periodic/rhythmic patterns (% of epoch) ‘rare’:<1%; ‘occasional’:1-10%; ‘frequent’:10-50%; ‘abundant’:50-90%; ‘continuous’:>90%.  Persistence for sporadic discharges: ‘rare’:<1/hr; ‘occasional’:1/min-1/hr; ‘frequent’:>1/min; ‘abundant’:>1/10 sec.  RPP=rhythmic and periodic patterns; GRDA=generalized rhythmic delta activity; FIRDA=frontal intermittent GRDA; LRDA=lateralized rhythmic delta activity; TIRDA=temporal intermittent rhythmic delta activity;  LPD=PLED=lateralized periodic discharges; GPD=generalized periodic discharges; BIPDs =bilateral independent periodic discharges; Mf=multifocal; SIRPDs=stimulus induced rhythmic, periodic, or ictal appearing discharges; BIRDs=brief potentially ictal rhythmic discharges >4 Hz, lasting .5-10s; PFA (paroxysmal bursts >13 Hz or =8 Hz <10s).  Modifiers: +F=with fast component; +S=with spike component; +R=with rhythmic component.  S-B=burst suppression pattern.  Max=maximal. N1-drowsy; N2-stage II sleep; N3-slow wave sleep. SSS/BETS=small sharp spikes/benign epileptiform transients of sleep. HV=hyperventilation; PS=photic stimulation]]]    FINDINGS:      Background:  SYMMETRY: symmetric  CONTINUOUS: continuous  PDR: symmetric, poorly-modulated 7-8 Hz activity, with amplitude to 40 uV, that attenuated to eye opening.  Low amplitude frontal beta noted in wakefulness.  REACTIVITY: present  VOLTAGE: normal, [defined typically between 20-150uV]  AP GRADIENT: present  BREACH: absent  OTHER: none    GENERALIZED SLOWING: present. Background is diffusely slow consisting of polymorphic delta theta activity up to 7-8  Hz    FOCAL SLOWING: none was present.    State Changes:   Drowsiness was characterized by fragmentation, attenuation, and slowing of the background activity.    N2 sleep transients with symmetric spindles and poorly formed K-complexes.      Sporadic Epileptiform Discharges:   None    Rhythmic and Periodic Patterns (RPPs):  None     Electrographic and Electroclinical seizures:  None    Other Clinical Events:  None    Activation Procedures:   Hyperventilation was not performed.    Photic stimulation was not performed.      Artifacts:  Intermittent myogenic and movement artifacts were noted.    ECG:  The heart rate on single channel ECG was predominantly between 70-80 BPM.  -------------------------------------------------------------------------------------------------------  EEG Classification:    Abnormal EEG in awake, drowsy and asleep states  1. Mild diffuse background slowing      -------------------------------------------------------------------------------------------------------  EEG Impression / Clinical Correlate:    Abnormal prolonged EEG study due to Mild diffuse cerebral dysfunction that is not specific in etiology    No epileptic discharges recorded.  No seizures recorded.      -------------------------------------------------------------------------------------------------------  ALEXA Houston  Attending Physician, [facility]    ------------------------------------  EEG Reading Room: [phone]  On Call Service After Hours: [phone]

## 2025-06-23 NOTE — CHART NOTE - NSCHARTNOTEFT_GEN_A_CORE
Interventional Radiology    69 year old female presenting with acute hypoxemic respiratory failure secondary to acute decompensated heart failure. Pt found to have enlarged lymph nodes on CT chest. IR c/s for biopsy.    -Will plan for lymph node bx on 6/24/25  -Pt does not need to be NPO  -per primary, pt only requires PROPHYLACTIC subQ heparin. Okay to give today's AM dose however please hold after and may resume 24 hours post procedure unless indicated in procedure note.  -STAT labs in the am  -Maintain active T&S x2  -IR procedure has been placed under Dr. Ronen Ayala  -d/w primary team      Please contact IR with any questions/ concerns regarding above plan at [phone].   Also available on teams.

## 2025-06-23 NOTE — PROGRESS NOTE ADULT - NUTRITIONAL ASSESSMENT
MEDICATIONS  (STANDING):  albuterol/ipratropium for Nebulization 3 milliLiter(s) Nebulizer every 6 hours  aspirin  chewable 81 milliGRAM(s) Oral daily  atorvastatin 80 milliGRAM(s) Oral at bedtime  divalproex  milliGRAM(s) Oral two times a day  gabapentin 300 milliGRAM(s) Oral three times a day  heparin  Infusion 1100 Unit(s)/Hr (16 mL/Hr) IV Continuous <Continuous>  hydrALAZINE 50 milliGRAM(s) Oral three times a day  insulin lispro (ADMELOG) corrective regimen sliding scale   SubCutaneous three times a day before meals  insulin lispro (ADMELOG) corrective regimen sliding scale   SubCutaneous at bedtime  isosorbide   dinitrate Tablet (ISORDIL) 10 milliGRAM(s) Oral three times a day  pantoprazole    Tablet 40 milliGRAM(s) Oral before breakfast  polyethylene glycol 3350 17 Gram(s) Oral at bedtime  senna 2 Tablet(s) Oral at bedtime    MEDICATIONS  (PRN):  acetaminophen     Tablet .. 650 milliGRAM(s) Oral every 6 hours PRN Temp greater or equal to 38C (100.4F), Mild Pain (1 - 3)  aluminum hydroxide/magnesium hydroxide/simethicone Suspension 30 milliLiter(s) Oral every 4 hours PRN Dyspepsia  melatonin 3 milliGRAM(s) Oral at bedtime PRN Insomnia  ondansetron Injectable 4 milliGRAM(s) IV Push every 8 hours PRN Nausea and/or Vomiting

## 2025-06-23 NOTE — PROGRESS NOTE ADULT - ASSESSMENT
ASSESSMENT: 70 y/o F w/ PMHx of HTN. DM, VERONIKA on BiPAP, neuropathy, hx of seizures on depakote, admitted for acute exacerbation of HF and initally seen for episode of lethargy and decreased responsiveness which has now gradually resolved after a 30-40 minutes after BIPAP use. NIHSS-4 ( non focal), MRI Brain without acute findings, routine EEG with multifocal cerebral dysfunction but no epileptiform activity and seizures. RRT for decreased responsiveness in setting of hypercapnia. Mild WBC elevation- 10.82 with BUN- 76 and cr-1.54. vit [id number], valproic acid in serum 53. 6/20 - Evaluated at bedside. Patient awake and alert but with speech latency, on NC oxygen. RRT today for fluctuating mentation. 6/23 -  MRI brain completed, without evidence of acute intracranial pathology. Patient connected to vEEG at bedside today. EEG with mild diffuse background slowing and mild nonspecific cerebral dysfunction, however no seizures or epileptic discharges recorded. Today, patient evaluated by neurology at bedside, seen sitting comfortably in chair without NC, reports she feels well. Endorses that she feels closer to her neurological baseline without more known fluctuations in mental status. No focal deficits on exam.     IMPRESSION: Brief episodes of AMS and decreased responsiveness possibly related to toxic metabolic encephalopathy given abnormal labs- overall pt neurologically improved since episode this morning. No focal deficits on exam, unlikely stroke given negative MRI brain, low suspicion for seizure activity (stable on current ASMs for many years) and no epileptiform activity on vEEG.    RECOMMENDATIONS:  -vEEG negative for seizures, please STOP  -Continue with home ASM Depakote 500mg BID  -Continue baby aspirin and atorvastatin 80mg  -Continue to address above medical issues, as you are doing  -Patient can follow up with epilepsy neurology at [address] [place] [address] 1-2 weeks after discharge. Please instruct the patient to call [phone] to schedule this appointment.  -No further inpatient neurologic workup  -Rest of care per primary team    Neurology will sign off at this time.  Thank you [phone]

## 2025-06-23 NOTE — PROGRESS NOTE ADULT - SUBJECTIVE AND OBJECTIVE BOX
CARDIOLOGY CONSULT PROGRESS NOTE  SHREE WYATT  MRN-[id number]    INTERVAL EVENTS:  - NAEO. Mental status improved on eval today  - Pending supraclavicular LN biopsy today    ROS:  All other review of systems is negative unless indicated above    MEDICATIONS  (STANDING):  aspirin  chewable 81 milliGRAM(s) Oral daily  atorvastatin 80 milliGRAM(s) Oral at bedtime  divalproex  milliGRAM(s) Oral two times a day  gabapentin 200 milliGRAM(s) Oral three times a day  heparin   Injectable 5000 Unit(s) SubCutaneous every 8 hours  insulin lispro (ADMELOG) corrective regimen sliding scale   SubCutaneous three times a day before meals  insulin lispro (ADMELOG) corrective regimen sliding scale   SubCutaneous at bedtime  metoprolol succinate ER 25 milliGRAM(s) Oral daily  pantoprazole    Tablet 40 milliGRAM(s) Oral before breakfast  polyethylene glycol 3350 17 Gram(s) Oral at bedtime  sacubitril 24 mG/valsartan 26 mG 1 Tablet(s) Oral two times a day  senna 2 Tablet(s) Oral at bedtime    MEDICATIONS  (PRN):  acetaminophen     Tablet .. 650 milliGRAM(s) Oral every 6 hours PRN Temp greater or equal to 38C (100.4F), Mild Pain (1 - 3)  ALPRAZolam 0.25 milliGRAM(s) Oral once PRN 20 minute before MRI  aluminum hydroxide/magnesium hydroxide/simethicone Suspension 30 milliLiter(s) Oral every 4 hours PRN Dyspepsia  melatonin 3 milliGRAM(s) Oral at bedtime PRN Insomnia  ondansetron Injectable 4 milliGRAM(s) IV Push every 8 hours PRN Nausea and/or Vomiting    Allergies    shellfish (Other; Short breath)  No Known Drug Allergies    Intolerances    lactose (Unknown)    P/E:  Vital Signs Last 24 Hrs  T(C): 36.6 (2025 11:21), Max: 37 (2025 20:13)  T(F): 97.9 (2025 11:21), Max: 98.6 (2025 20:13)  HR: 67 (2025 11:21) (67 - 97)  BP: 133/78 (2025 11:21) (92/54 - 133/78)  BP(mean): --  RR: 18 (2025 11:21) (17 - 18)  SpO2: 96% (2025 11:21) (91% - 100%)    Parameters below as of 2025 11:21  Patient On (Oxygen Delivery Method): nasal cannula  O2 Flow (L/min): 2    Daily     Daily Weight in k.4 (2025 07:45)  I&O's Detail    2025 07:01  -  2025 07:00  --------------------------------------------------------  IN:    Oral Fluid: 880 mL  Total IN: 880 mL    OUT:    Voided (mL): 1100 mL  Total OUT: 1100 mL    Total NET: -220 mL      2025 07:  -  2025 13:50  --------------------------------------------------------  IN:    Oral Fluid: 480 mL  Total IN: 480 mL    OUT:  Total OUT: 0 mL    Total NET: 480 mL        I&O's Summary    2025 07:01  -  2025 07:00  --------------------------------------------------------  IN: 880 mL / OUT: 1100 mL / NET: -220 mL    2025 07:01  -  2025 13:50  --------------------------------------------------------  IN: 480 mL / OUT: 0 mL / NET: 480 mL      - gen: laying in hospital bed, NAD  - HEENT: MMM, NCAT  - neck: no JVD, supple  - heart: RRR, nml S1/S2, no RMG  - lungs: CTABL, nml WOB  - abd: soft, NTND, NABS  - ext: WWP, PPP, no ART    RELEVANT RECENT LABS/IMAGING/STUDIES:                        9.0    6.72  )-----------( 320      ( 2025 10:29 )             30.8     06-23    134[L]  |  96  |  88[H]  ----------------------------<  142[H]  5.1   |  27  |  1.36[H]    Ca    9.6      2025 10:29    TPro  6.7  /  Alb  2.8[L]  /  TBili  0.2  /  DBili  x   /  AST  19  /  ALT  13  /  AlkPhos  53  06-22    LIVER FUNCTIONS - ( 2025 05:21 )  Alb: 2.8 g/dL / Pro: 6.7 g/dL / ALK PHOS: 53 U/L / ALT: 13 U/L / AST: 19 U/L / GGT: x           PT/INR - ( 2025 10:29 )   PT: 12.0 sec;   INR: 1.04 ratio           --------------------------------------        --------------------------------------

## 2025-06-24 LAB
ANION GAP SERPL CALC-SCNC: 11 MMOL/L — SIGNIFICANT CHANGE UP (ref 5–17)
BLD GP AB SCN SERPL QL: NEGATIVE — SIGNIFICANT CHANGE UP
BUN SERPL-MCNC: 91 MG/DL — HIGH (ref 7–23)
CALCIUM SERPL-MCNC: 9.7 MG/DL — SIGNIFICANT CHANGE UP (ref 8.4–10.5)
CHLORIDE SERPL-SCNC: 96 MMOL/L — SIGNIFICANT CHANGE UP (ref 96–108)
CO2 SERPL-SCNC: 26 MMOL/L — SIGNIFICANT CHANGE UP (ref 22–31)
CREAT SERPL-MCNC: 1.48 MG/DL — HIGH (ref 0.5–1.3)
CULTURE RESULTS: SIGNIFICANT CHANGE UP
EGFR: 38 ML/MIN/1.73M2 — LOW
EGFR: 38 ML/MIN/1.73M2 — LOW
FLOW CYTOMETRY FINAL REPORT: SIGNIFICANT CHANGE UP
GLUCOSE BLDC GLUCOMTR-MCNC: 105 MG/DL — HIGH (ref 70–99)
GLUCOSE BLDC GLUCOMTR-MCNC: 141 MG/DL — HIGH (ref 70–99)
GLUCOSE BLDC GLUCOMTR-MCNC: 198 MG/DL — HIGH (ref 70–99)
GLUCOSE BLDC GLUCOMTR-MCNC: 239 MG/DL — HIGH (ref 70–99)
GLUCOSE SERPL-MCNC: 102 MG/DL — HIGH (ref 70–99)
HCT VFR BLD CALC: 29 % — LOW (ref 34.5–45)
HGB BLD-MCNC: 8.7 G/DL — LOW (ref 11.5–15.5)
MCHC RBC-ENTMCNC: 27.4 PG — SIGNIFICANT CHANGE UP (ref 27–34)
MCHC RBC-ENTMCNC: 30 G/DL — LOW (ref 32–36)
MCV RBC AUTO: 91.5 FL — SIGNIFICANT CHANGE UP (ref 80–100)
NRBC # BLD AUTO: 0 K/UL — SIGNIFICANT CHANGE UP (ref 0–0)
NRBC # FLD: 0 K/UL — SIGNIFICANT CHANGE UP (ref 0–0)
NRBC BLD AUTO-RTO: 0 /100 WBCS — SIGNIFICANT CHANGE UP (ref 0–0)
PLATELET # BLD AUTO: 312 K/UL — SIGNIFICANT CHANGE UP (ref 150–400)
PMV BLD: 11.1 FL — SIGNIFICANT CHANGE UP (ref 7–13)
POTASSIUM SERPL-MCNC: 5.1 MMOL/L — SIGNIFICANT CHANGE UP (ref 3.5–5.3)
POTASSIUM SERPL-SCNC: 5.1 MMOL/L — SIGNIFICANT CHANGE UP (ref 3.5–5.3)
RBC # BLD: 3.17 M/UL — LOW (ref 3.8–5.2)
RBC # FLD: 15.9 % — HIGH (ref 10.3–14.5)
RH IG SCN BLD-IMP: POSITIVE — SIGNIFICANT CHANGE UP
SODIUM SERPL-SCNC: 133 MMOL/L — LOW (ref 135–145)
SPECIMEN SOURCE: SIGNIFICANT CHANGE UP
WBC # BLD: 6.47 K/UL — SIGNIFICANT CHANGE UP (ref 3.8–10.5)
WBC # FLD AUTO: 6.47 K/UL — SIGNIFICANT CHANGE UP (ref 3.8–10.5)

## 2025-06-24 PROCEDURE — 82306 VITAMIN D 25 HYDROXY: CPT

## 2025-06-24 PROCEDURE — 82803 BLOOD GASES ANY COMBINATION: CPT

## 2025-06-24 PROCEDURE — 93306 TTE W/DOPPLER COMPLETE: CPT

## 2025-06-24 PROCEDURE — 71045 X-RAY EXAM CHEST 1 VIEW: CPT

## 2025-06-24 PROCEDURE — 38505 NEEDLE BIOPSY LYMPH NODES: CPT | Mod: LT

## 2025-06-24 PROCEDURE — 76942 ECHO GUIDE FOR BIOPSY: CPT | Mod: 26

## 2025-06-24 PROCEDURE — 86850 RBC ANTIBODY SCREEN: CPT

## 2025-06-24 PROCEDURE — 70450 CT HEAD/BRAIN W/O DYE: CPT

## 2025-06-24 PROCEDURE — 85018 HEMOGLOBIN: CPT

## 2025-06-24 PROCEDURE — 80164 ASSAY DIPROPYLACETIC ACD TOT: CPT

## 2025-06-24 PROCEDURE — 88184 FLOWCYTOMETRY/ TC 1 MARKER: CPT

## 2025-06-24 PROCEDURE — 83615 LACTATE (LD) (LDH) ENZYME: CPT

## 2025-06-24 PROCEDURE — 84443 ASSAY THYROID STIM HORMONE: CPT

## 2025-06-24 PROCEDURE — A9561: CPT

## 2025-06-24 PROCEDURE — A9585: CPT

## 2025-06-24 PROCEDURE — 75561 CARDIAC MRI FOR MORPH W/DYE: CPT | Mod: 26

## 2025-06-24 PROCEDURE — 0241U: CPT

## 2025-06-24 PROCEDURE — 78830 RP LOCLZJ TUM SPECT W/CT 1: CPT

## 2025-06-24 PROCEDURE — 75561 CARDIAC MRI FOR MORPH W/DYE: CPT

## 2025-06-24 PROCEDURE — 87070 CULTURE OTHR SPECIMN AEROBIC: CPT

## 2025-06-24 PROCEDURE — 84295 ASSAY OF SERUM SODIUM: CPT

## 2025-06-24 PROCEDURE — 85014 HEMATOCRIT: CPT

## 2025-06-24 PROCEDURE — 85025 COMPLETE CBC W/AUTO DIFF WBC: CPT

## 2025-06-24 PROCEDURE — 83735 ASSAY OF MAGNESIUM: CPT

## 2025-06-24 PROCEDURE — 84156 ASSAY OF PROTEIN URINE: CPT

## 2025-06-24 PROCEDURE — 97110 THERAPEUTIC EXERCISES: CPT

## 2025-06-24 PROCEDURE — 84132 ASSAY OF SERUM POTASSIUM: CPT

## 2025-06-24 PROCEDURE — 83880 ASSAY OF NATRIURETIC PEPTIDE: CPT

## 2025-06-24 PROCEDURE — 85520 HEPARIN ASSAY: CPT

## 2025-06-24 PROCEDURE — 80048 BASIC METABOLIC PNL TOTAL CA: CPT

## 2025-06-24 PROCEDURE — 95816 EEG AWAKE AND DROWSY: CPT

## 2025-06-24 PROCEDURE — 86334 IMMUNOFIX E-PHORESIS SERUM: CPT

## 2025-06-24 PROCEDURE — 70551 MRI BRAIN STEM W/O DYE: CPT

## 2025-06-24 PROCEDURE — 94660 CPAP INITIATION&MGMT: CPT

## 2025-06-24 PROCEDURE — 87015 SPECIMEN INFECT AGNT CONCNTJ: CPT

## 2025-06-24 PROCEDURE — 83521 IG LIGHT CHAINS FREE EACH: CPT

## 2025-06-24 PROCEDURE — 82042 OTHER SOURCE ALBUMIN QUAN EA: CPT

## 2025-06-24 PROCEDURE — 84157 ASSAY OF PROTEIN OTHER: CPT

## 2025-06-24 PROCEDURE — 88173 CYTOPATH EVAL FNA REPORT: CPT | Mod: 26

## 2025-06-24 PROCEDURE — 36600 WITHDRAWAL OF ARTERIAL BLOOD: CPT

## 2025-06-24 PROCEDURE — 88341 IMHCHEM/IMCYTCHM EA ADD ANTB: CPT | Mod: 26,59

## 2025-06-24 PROCEDURE — 93005 ELECTROCARDIOGRAM TRACING: CPT

## 2025-06-24 PROCEDURE — 80061 LIPID PANEL: CPT

## 2025-06-24 PROCEDURE — 84100 ASSAY OF PHOSPHORUS: CPT

## 2025-06-24 PROCEDURE — 82947 ASSAY GLUCOSE BLOOD QUANT: CPT

## 2025-06-24 PROCEDURE — 80053 COMPREHEN METABOLIC PANEL: CPT

## 2025-06-24 PROCEDURE — 85610 PROTHROMBIN TIME: CPT

## 2025-06-24 PROCEDURE — 99233 SBSQ HOSP IP/OBS HIGH 50: CPT | Mod: GC

## 2025-06-24 PROCEDURE — 99233 SBSQ HOSP IP/OBS HIGH 50: CPT

## 2025-06-24 PROCEDURE — 87102 FUNGUS ISOLATION CULTURE: CPT

## 2025-06-24 PROCEDURE — 83986 ASSAY PH BODY FLUID NOS: CPT

## 2025-06-24 PROCEDURE — A9538: CPT

## 2025-06-24 PROCEDURE — 87205 SMEAR GRAM STAIN: CPT

## 2025-06-24 PROCEDURE — 84166 PROTEIN E-PHORESIS/URINE/CSF: CPT

## 2025-06-24 PROCEDURE — 86901 BLOOD TYPING SEROLOGIC RH(D): CPT

## 2025-06-24 PROCEDURE — 97161 PT EVAL LOW COMPLEX 20 MIN: CPT

## 2025-06-24 PROCEDURE — 88342 IMHCHEM/IMCYTCHM 1ST ANTB: CPT | Mod: 26,59

## 2025-06-24 PROCEDURE — 88333 PATH CONSLTJ SURG CYTO XM 1: CPT | Mod: 26,59

## 2025-06-24 PROCEDURE — 82435 ASSAY OF BLOOD CHLORIDE: CPT

## 2025-06-24 PROCEDURE — 36415 COLL VENOUS BLD VENIPUNCTURE: CPT

## 2025-06-24 PROCEDURE — 88185 FLOWCYTOMETRY/TC ADD-ON: CPT

## 2025-06-24 PROCEDURE — 82330 ASSAY OF CALCIUM: CPT

## 2025-06-24 PROCEDURE — 84165 PROTEIN E-PHORESIS SERUM: CPT

## 2025-06-24 PROCEDURE — 71046 X-RAY EXAM CHEST 2 VIEWS: CPT

## 2025-06-24 PROCEDURE — 82945 GLUCOSE OTHER FLUID: CPT

## 2025-06-24 PROCEDURE — 86900 BLOOD TYPING SEROLOGIC ABO: CPT

## 2025-06-24 PROCEDURE — 88305 TISSUE EXAM BY PATHOLOGIST: CPT | Mod: 26

## 2025-06-24 PROCEDURE — 86335 IMMUNFIX E-PHORSIS/URINE/CSF: CPT

## 2025-06-24 PROCEDURE — C8924: CPT

## 2025-06-24 PROCEDURE — 85027 COMPLETE CBC AUTOMATED: CPT

## 2025-06-24 PROCEDURE — 84155 ASSAY OF PROTEIN SERUM: CPT

## 2025-06-24 PROCEDURE — 84145 PROCALCITONIN (PCT): CPT

## 2025-06-24 PROCEDURE — 94640 AIRWAY INHALATION TREATMENT: CPT

## 2025-06-24 PROCEDURE — 83605 ASSAY OF LACTIC ACID: CPT

## 2025-06-24 PROCEDURE — 85730 THROMBOPLASTIN TIME PARTIAL: CPT

## 2025-06-24 PROCEDURE — 71250 CT THORAX DX C-: CPT

## 2025-06-24 PROCEDURE — 82140 ASSAY OF AMMONIA: CPT

## 2025-06-24 PROCEDURE — 88360 TUMOR IMMUNOHISTOCHEM/MANUAL: CPT | Mod: 26

## 2025-06-24 PROCEDURE — 97530 THERAPEUTIC ACTIVITIES: CPT

## 2025-06-24 PROCEDURE — 87075 CULTR BACTERIA EXCEPT BLOOD: CPT

## 2025-06-24 PROCEDURE — 83036 HEMOGLOBIN GLYCOSYLATED A1C: CPT

## 2025-06-24 PROCEDURE — 82962 GLUCOSE BLOOD TEST: CPT

## 2025-06-24 PROCEDURE — 89051 BODY FLUID CELL COUNT: CPT

## 2025-06-24 PROCEDURE — 97112 NEUROMUSCULAR REEDUCATION: CPT

## 2025-06-24 RX ORDER — SPIRONOLACTONE 25 MG
25 TABLET ORAL DAILY
Refills: 0 | Status: DISCONTINUED | OUTPATIENT
Start: 2025-06-24 | End: 2025-06-26

## 2025-06-24 RX ADMIN — SACUBITRIL AND VALSARTAN 1 TABLET(S): 6; 6 PELLET ORAL at 17:22

## 2025-06-24 RX ADMIN — POLYETHYLENE GLYCOL 3350 17 GRAM(S): 17 POWDER, FOR SOLUTION ORAL at 21:46

## 2025-06-24 RX ADMIN — Medication 500 MILLIGRAM(S): at 05:15

## 2025-06-24 RX ADMIN — ATORVASTATIN CALCIUM 80 MILLIGRAM(S): 80 TABLET, FILM COATED ORAL at 21:46

## 2025-06-24 RX ADMIN — Medication 2 TABLET(S): at 21:46

## 2025-06-24 RX ADMIN — Medication 0.25 MILLIGRAM(S): at 11:51

## 2025-06-24 RX ADMIN — Medication 81 MILLIGRAM(S): at 11:51

## 2025-06-24 RX ADMIN — Medication 500 MILLIGRAM(S): at 17:22

## 2025-06-24 RX ADMIN — GABAPENTIN 200 MILLIGRAM(S): 400 CAPSULE ORAL at 21:45

## 2025-06-24 RX ADMIN — Medication 40 MILLIGRAM(S): at 05:15

## 2025-06-24 RX ADMIN — METOPROLOL SUCCINATE 25 MILLIGRAM(S): 50 TABLET, EXTENDED RELEASE ORAL at 05:14

## 2025-06-24 RX ADMIN — INSULIN LISPRO 1: 100 INJECTION, SOLUTION INTRAVENOUS; SUBCUTANEOUS at 11:51

## 2025-06-24 RX ADMIN — GABAPENTIN 200 MILLIGRAM(S): 400 CAPSULE ORAL at 05:15

## 2025-06-24 RX ADMIN — Medication 25 MILLIGRAM(S): at 17:23

## 2025-06-24 RX ADMIN — SACUBITRIL AND VALSARTAN 1 TABLET(S): 6; 6 PELLET ORAL at 05:15

## 2025-06-24 NOTE — PROGRESS NOTE ADULT - SUBJECTIVE AND OBJECTIVE BOX
Interval Events:      REVIEW OF SYSTEMS:  Negative except as documented above.      OBJECTIVE:  ICU Vital Signs Last 24 Hrs  T(C): 36.7 (24 Jun 2025 04:24), Max: 37.2 (23 Jun 2025 20:17)  T(F): 98.1 (24 Jun 2025 04:24), Max: 98.9 (23 Jun 2025 20:17)  HR: 89 (24 Jun 2025 06:00) (67 - 95)  BP: 121/71 (24 Jun 2025 04:24) (94/57 - 133/78)  BP(mean): --  ABP: --  ABP(mean): --  RR: 18 (24 Jun 2025 04:24) (18 - 18)  SpO2: 100% (24 Jun 2025 06:00) (93% - 100%)    O2 Parameters below as of 24 Jun 2025 04:24  Patient On (Oxygen Delivery Method): nasal cannula  O2 Flow (L/min): 2            06-23 @ 07:01  -  06-24 @ 07:00  --------------------------------------------------------  IN: 680 mL / OUT: 500 mL / NET: 180 mL      CAPILLARY BLOOD GLUCOSE      POCT Blood Glucose.: 105 mg/dL (24 Jun 2025 07:17)      PHYSICAL EXAM:  General: NAD  HEENT:  EOMI, sclera anicteric, moist mucus membranes  Neck: supple  Cardiovascular: RRR  Respiratory: CTAB, no wheezes, crackles, or rhonci  Abdomen: soft, nontender  Extremities: warm and well perfused, no edema, no clubbing  Skin: no rashes  Neurological: no focal deficits    HOSPITAL MEDICATIONS:  MEDICATIONS  (STANDING):  aspirin  chewable 81 milliGRAM(s) Oral daily  atorvastatin 80 milliGRAM(s) Oral at bedtime  divalproex  milliGRAM(s) Oral two times a day  gabapentin 200 milliGRAM(s) Oral three times a day  insulin lispro (ADMELOG) corrective regimen sliding scale   SubCutaneous three times a day before meals  insulin lispro (ADMELOG) corrective regimen sliding scale   SubCutaneous at bedtime  metoprolol succinate ER 25 milliGRAM(s) Oral daily  pantoprazole    Tablet 40 milliGRAM(s) Oral before breakfast  polyethylene glycol 3350 17 Gram(s) Oral at bedtime  sacubitril 24 mG/valsartan 26 mG 1 Tablet(s) Oral two times a day  senna 2 Tablet(s) Oral at bedtime    MEDICATIONS  (PRN):  acetaminophen     Tablet .. 650 milliGRAM(s) Oral every 6 hours PRN Temp greater or equal to 38C (100.4F), Mild Pain (1 - 3)  ALPRAZolam 0.25 milliGRAM(s) Oral once PRN 20 minute before MRI  aluminum hydroxide/magnesium hydroxide/simethicone Suspension 30 milliLiter(s) Oral every 4 hours PRN Dyspepsia  melatonin 3 milliGRAM(s) Oral at bedtime PRN Insomnia  ondansetron Injectable 4 milliGRAM(s) IV Push every 8 hours PRN Nausea and/or Vomiting      LABS:                        8.7    6.47  )-----------( 312      ( 24 Jun 2025 07:08 )             29.0     Hgb Trend: 8.7<--, 9.0<--, 8.7<--, 8.7<--, 9.3<--  06-24    133[L]  |  96  |  91[H]  ----------------------------<  102[H]  5.1   |  26  |  1.48[H]    Ca    9.7      24 Jun 2025 07:03      Creatinine Trend: 1.48<--, 1.36<--, 1.74<--, 1.70<--, 1.54<--, 1.48<--  PT/INR - ( 23 Jun 2025 10:29 )   PT: 12.0 sec;   INR: 1.04 ratio           Urinalysis Basic - ( 24 Jun 2025 07:03 )    Color: x / Appearance: x / SG: x / pH: x  Gluc: 102 mg/dL / Ketone: x  / Bili: x / Urobili: x   Blood: x / Protein: x / Nitrite: x   Leuk Esterase: x / RBC: x / WBC x   Sq Epi: x / Non Sq Epi: x / Bacteria: x            MICROBIOLOGY:       RADIOLOGY:  [x] Reviewed and interpreted by me

## 2025-06-24 NOTE — PRE-OP CHECKLIST - PATIENT'S PERSONAL PROPERTY GIVEN TO
LOV: 10/15/18    Last refill: 6/29/18    Next OV:  None    Pt takes as needed for muscle spasms.   Refill sent.   
on unit

## 2025-06-24 NOTE — PROGRESS NOTE ADULT - ASSESSMENT
69F with PMH of HTN, DM2, VERONIKA, sciatica, peripheral neuropathy, hx of seizure initially presented to [facility] on 6/10/25 with progressively worsening SOB, admitted for ADHF and possible pneumonia. EKG LBBB  Received IV diuretics (lasix) and antibiotics for pneumonia and was transferred to CCU. Ultimately transferred to [facility] CICU for further management. On arrival, she was on BIPAP & started on IV diuresis for respiratory distress & significant pulmonary edema. She has since been downgraded to telemetry & is on 2L NC. She has responded well to diuresis and GDMT. S/p thoracentesis 6/19. Her IVC today is small and collapsible. She is pending L/RHC for newly dx'ed CMP and cMRI to rule out infiltrative disease (OSH chest CT with significant mediastinal LAD-?sarcoid).   re     advise    1 bumex on hold asper CHF service  2.  Please obtain NM amyloid scan ( [facility])  and cardiac MRI to assess eitiology of cardiomyopathy  3. neurology assessment underway , consider decrease valproic acid and gabapentin with lethargy  5. left and right heart cath   6. possible CRT D ICD in future

## 2025-06-24 NOTE — PROGRESS NOTE ADULT - NS ATTEST RISK PROBLEM GEN_ALL_CORE FT
#HF, etiology unknown  #Bilateral pleural effusions  #Volume overload  #Hypercapneic/Hypoxic resp failure  #MIKAELA  #Encephalopathy  #VERONIKA
#HF, etiology unknown  #Bilateral pleural effusions  #Volume overload  #Hypercapneic/Hypoxic resp failure  #MIKAELA  #Encephalopathy  #VERONIKA
negative...

## 2025-06-24 NOTE — PROGRESS NOTE ADULT - PROBLEM SELECTOR PLAN 3
MIKAELA on CKD3b  Renal function much improved  - diuretics on hold  - Creatinine Trend: 1.48<--, 1.36<--, 1.74<--, 1.70<--, 1.54<--, 1.48<--

## 2025-06-24 NOTE — PROGRESS NOTE ADULT - SUBJECTIVE AND OBJECTIVE BOX
INTERVAL HPI/OVERNIGHT EVENTS: patietn remains stable but still intermittently lethargy Bumex stopped , less short of breath    MEDICATIONS  (STANDING):  aspirin  chewable 81 milliGRAM(s) Oral daily  atorvastatin 80 milliGRAM(s) Oral at bedtime  divalproex  milliGRAM(s) Oral two times a day  gabapentin 200 milliGRAM(s) Oral three times a day  insulin lispro (ADMELOG) corrective regimen sliding scale   SubCutaneous three times a day before meals  insulin lispro (ADMELOG) corrective regimen sliding scale   SubCutaneous at bedtime  metoprolol succinate ER 25 milliGRAM(s) Oral daily  pantoprazole    Tablet 40 milliGRAM(s) Oral before breakfast  polyethylene glycol 3350 17 Gram(s) Oral at bedtime  sacubitril 24 mG/valsartan 26 mG 1 Tablet(s) Oral two times a day  senna 2 Tablet(s) Oral at bedtime  spironolactone 25 milliGRAM(s) Oral daily    MEDICATIONS  (PRN):  acetaminophen     Tablet .. 650 milliGRAM(s) Oral every 6 hours PRN Temp greater or equal to 38C (100.4F), Mild Pain (1 - 3)  aluminum hydroxide/magnesium hydroxide/simethicone Suspension 30 milliLiter(s) Oral every 4 hours PRN Dyspepsia  melatonin 3 milliGRAM(s) Oral at bedtime PRN Insomnia  ondansetron Injectable 4 milliGRAM(s) IV Push every 8 hours PRN Nausea and/or Vomiting      Allergies    shellfish (Other; Short breath)  No Known Drug Allergies    Intolerances    lactose (Unknown)    ROS:  General: Pt denies recent weight loss/fever/chills    Neurological: denies numbness or  sensation loss    Cardiovascular: denies chest pain/palpitations/leg edema    Respiratory and Thorax: denies SOB/cough/wheezing    Gastrointestinal: denies abdominal pain/diarrhea/constipation/bloody stool    Genitourinary: denies urinary frequency/urgency/ dysuria    Musculoskeletal: denies joint pain or swelling, denies restricted motion    Hematologic: denies abnormal bleeding  	    	  	    		        	    	            Vital Signs Last 24 Hrs  T(C): 36.4 (2025 20:19), Max: 37 (2025 11:00)  T(F): 97.6 (2025 20:19), Max: 98.6 (2025 11:00)  HR: 88 (2025 20:19) (82 - 89)  BP: 112/71 (2025 20:19) (108/65 - 125/74)  BP(mean): --  RR: 18 (2025 20:19) (18 - 18)  SpO2: 100% (2025 20:19) (95% - 100%)    Parameters below as of 2025 20:19  Patient On (Oxygen Delivery Method): BiPAP/CPAP      Daily Height in cm: 162.6 (2025 11:47)    Daily Weight in k.9 (2025 07:53)     @ 07:  -   @ 07:00  --------------------------------------------------------  IN: 680 mL / OUT: 500 mL / NET: 180 mL     @ 07:  -   @ 21:46  --------------------------------------------------------  IN: 480 mL / OUT: 300 mL / NET: 180 mL      Physical Exam:  wwn female  lethargic  cor RRR  lung clear  abd soft   ext no edema      LABS:                        8.7    6.47  )-----------( 312      ( 2025 07:08 )             29.0     06-24    133[L]  |  96  |  91[H]  ----------------------------<  102[H]  5.1   |  26  |  1.48[H]    Ca    9.7      2025 07:03      PT/INR - ( 2025 10:29 )   PT: 12.0 sec;   INR: 1.04 ratio           Urinalysis Basic - ( 2025 07:03 )    Color: x / Appearance: x / SG: x / pH: x  Gluc: 102 mg/dL / Ketone: x  / Bili: x / Urobili: x   Blood: x / Protein: x / Nitrite: x   Leuk Esterase: x / RBC: x / WBC x   Sq Epi: x / Non Sq Epi: x / Bacteria: x        RADIOLOGY & ADDITIONAL TESTS:    TELE:    EKG:

## 2025-06-24 NOTE — PRE PROCEDURE NOTE - PRE PROCEDURE EVALUATION
Interventional Radiology    HPI: 69 year old female presenting with acute hypoxemic respiratory failure secondary to acute decompensated heart failure. Pt found to have enlarged lymph nodes on CT chest. Patient presents to IR for left supraclavicular lymph node biopsy.    Allergies: No Known Drug Allergies    Medications (Abx/Cardiac/Anticoagulation/Blood Products)  aspirin  chewable: 81 milliGRAM(s) Oral ( @ 11:51)  heparin   Injectable: 5000 Unit(s) SubCutaneous ( @ 21:29)  metoprolol succinate ER: 25 milliGRAM(s) Oral ( @ 05:14)  sacubitril 24 mG/valsartan 26 m Tablet(s) Oral ( @ 05:15)  sacubitril 49 mG/valsartan 51 m Tablet(s) Oral ( @ 06:13)    Data:  162.6  97.9  T(C): 37  HR: 87  BP: 125/74  RR: 18  SpO2: 96%    Exam  General: No acute distress  Chest: Non labored breathing  Abdomen: Non-distended  Extremities: No swelling, warm    -WBC 6.47 / HgB 8.7 / Hct 29.0 / Plt 312  -Na 133 / Cl 96 / BUN 91 / Glucose 102  -K 5.1 / CO2 26 / Cr 1.48  -ALT -- / Alk Phos -- / T.Bili --  -INR1.04    Imaging: Reviewed    Plan: 69y Female presents for left supraclavicular lymph node biopsy  -Risks/Benefits/alternatives explained with the patient and/or healthcare proxy and witnessed informed consent obtained.

## 2025-06-24 NOTE — PROGRESS NOTE ADULT - SUBJECTIVE AND OBJECTIVE BOX
Andre Reyes, M.D.  Office: [phone]  Available thru [employer] Teams     Patient is a 69y old  Female who presents with a chief complaint of Acute decompensated heart failure (24 Jun 2025 08:44)          SUBJECTIVE / OVERNIGHT EVENTS:    No acute overnight events.        MEDICATIONS  (STANDING):  aspirin  chewable 81 milliGRAM(s) Oral daily  atorvastatin 80 milliGRAM(s) Oral at bedtime  divalproex  milliGRAM(s) Oral two times a day  gabapentin 200 milliGRAM(s) Oral three times a day  insulin lispro (ADMELOG) corrective regimen sliding scale   SubCutaneous three times a day before meals  insulin lispro (ADMELOG) corrective regimen sliding scale   SubCutaneous at bedtime  metoprolol succinate ER 25 milliGRAM(s) Oral daily  pantoprazole    Tablet 40 milliGRAM(s) Oral before breakfast  polyethylene glycol 3350 17 Gram(s) Oral at bedtime  sacubitril 24 mG/valsartan 26 mG 1 Tablet(s) Oral two times a day  senna 2 Tablet(s) Oral at bedtime    MEDICATIONS  (PRN):  acetaminophen     Tablet .. 650 milliGRAM(s) Oral every 6 hours PRN Temp greater or equal to 38C (100.4F), Mild Pain (1 - 3)  ALPRAZolam 0.25 milliGRAM(s) Oral once PRN 20 minute before MRI  aluminum hydroxide/magnesium hydroxide/simethicone Suspension 30 milliLiter(s) Oral every 4 hours PRN Dyspepsia  melatonin 3 milliGRAM(s) Oral at bedtime PRN Insomnia  ondansetron Injectable 4 milliGRAM(s) IV Push every 8 hours PRN Nausea and/or Vomiting          T(C): 36.7 (06-24 @ 04:24), Max: 37.2 (06-23 @ 20:17)   HR: 89   BP: 121/71   RR: 18   SpO2: 100%    PHYSICAL EXAM:    CONSTITUTIONAL: NAD, well-developed, well-groomed  EYES: PERRLA; conjunctiva and sclera clear  ENMT: Moist oral mucosa, no pharyngeal injection or exudates; normal dentition  RESPIRATORY: Normal respiratory effort; lungs are clear to auscultation bilaterally  CARDIOVASCULAR: Regular rate and rhythm, normal S1 and S2, no murmur/rub/gallop; No lower extremity edema; Peripheral pulses are 2+ bilaterally  ABDOMEN: Nontender to palpation, normoactive bowel sounds, no rebound/guarding; No hepatosplenomegaly  MUSCULOSKELETAL:  no clubbing or cyanosis of digits; no joint swelling or tenderness to palpation  PSYCH: A+O to person, place, and time; affect appropriate  NEUROLOGY: CN 2-12 are intact and symmetric; no gross sensory deficits       LABS:                        8.7    6.47  )-----------( 312      ( 24 Jun 2025 07:08 )             29.0      06-24    133[L]  |  96  |  91[H]  ----------------------------<  102[H]  5.1   |  26  |  1.48[H]    Ca    9.7      24 Jun 2025 07:03         CAPILLARY BLOOD GLUCOSE      POCT Blood Glucose.: 105 mg/dL (24 Jun 2025 07:17)  POCT Blood Glucose.: 191 mg/dL (23 Jun 2025 21:16)  POCT Blood Glucose.: 248 mg/dL (23 Jun 2025 18:38)  POCT Blood Glucose.: 264 mg/dL (23 Jun 2025 18:28)  POCT Blood Glucose.: 291 mg/dL (23 Jun 2025 17:07)  POCT Blood Glucose.: 148 mg/dL (23 Jun 2025 11:38)      RADIOLOGY & ADDITIONAL TESTS:    Imaging Personally Reviewed:  Consultant(s) Notes Reviewed:    Care Discussed with Consultants/Other Providers:   Andre Reyes, M.D.  Office: [phone]  Available thru [employer] Teams     Patient is a 69y old  Female who presents with a chief complaint of Acute decompensated heart failure (24 Jun 2025 08:44)          SUBJECTIVE / OVERNIGHT EVENTS:    No acute overnight events.  No New complaints      MEDICATIONS  (STANDING):  aspirin  chewable 81 milliGRAM(s) Oral daily  atorvastatin 80 milliGRAM(s) Oral at bedtime  divalproex  milliGRAM(s) Oral two times a day  gabapentin 200 milliGRAM(s) Oral three times a day  insulin lispro (ADMELOG) corrective regimen sliding scale   SubCutaneous three times a day before meals  insulin lispro (ADMELOG) corrective regimen sliding scale   SubCutaneous at bedtime  metoprolol succinate ER 25 milliGRAM(s) Oral daily  pantoprazole    Tablet 40 milliGRAM(s) Oral before breakfast  polyethylene glycol 3350 17 Gram(s) Oral at bedtime  sacubitril 24 mG/valsartan 26 mG 1 Tablet(s) Oral two times a day  senna 2 Tablet(s) Oral at bedtime    MEDICATIONS  (PRN):  acetaminophen     Tablet .. 650 milliGRAM(s) Oral every 6 hours PRN Temp greater or equal to 38C (100.4F), Mild Pain (1 - 3)  ALPRAZolam 0.25 milliGRAM(s) Oral once PRN 20 minute before MRI  aluminum hydroxide/magnesium hydroxide/simethicone Suspension 30 milliLiter(s) Oral every 4 hours PRN Dyspepsia  melatonin 3 milliGRAM(s) Oral at bedtime PRN Insomnia  ondansetron Injectable 4 milliGRAM(s) IV Push every 8 hours PRN Nausea and/or Vomiting          T(C): 36.7 (06-24 @ 04:24), Max: 37.2 (06-23 @ 20:17)   HR: 89   BP: 121/71   RR: 18   SpO2: 100%    PHYSICAL EXAM:    CONSTITUTIONAL: NAD, well-developed, well-groomed  EYES: PERRLA; conjunctiva and sclera clear  ENMT: Moist oral mucosa, no pharyngeal injection or exudates; normal dentition  RESPIRATORY: Normal respiratory effort; lungs are clear to auscultation bilaterally  CARDIOVASCULAR: Regular rate and rhythm, normal S1 and S2, no murmur/rub/gallop; No lower extremity edema; Peripheral pulses are 2+ bilaterally  ABDOMEN: Nontender to palpation, normoactive bowel sounds, no rebound/guarding; No hepatosplenomegaly  MUSCULOSKELETAL:  no clubbing or cyanosis of digits; no joint swelling or tenderness to palpation  PSYCH: A+O to person, place, and time; affect appropriate  NEUROLOGY: CN 2-12 are intact and symmetric; no gross sensory deficits       LABS:                        8.7    6.47  )-----------( 312      ( 24 Jun 2025 07:08 )             29.0      06-24    133[L]  |  96  |  91[H]  ----------------------------<  102[H]  5.1   |  26  |  1.48[H]    Ca    9.7      24 Jun 2025 07:03         CAPILLARY BLOOD GLUCOSE      POCT Blood Glucose.: 105 mg/dL (24 Jun 2025 07:17)  POCT Blood Glucose.: 191 mg/dL (23 Jun 2025 21:16)  POCT Blood Glucose.: 248 mg/dL (23 Jun 2025 18:38)  POCT Blood Glucose.: 264 mg/dL (23 Jun 2025 18:28)  POCT Blood Glucose.: 291 mg/dL (23 Jun 2025 17:07)  POCT Blood Glucose.: 148 mg/dL (23 Jun 2025 11:38)      RADIOLOGY & ADDITIONAL TESTS:    Imaging Personally Reviewed:  Consultant(s) Notes Reviewed:    Care Discussed with Consultants/Other Providers:

## 2025-06-24 NOTE — PROGRESS NOTE ADULT - ASSESSMENT
69F with PMHx DM, HTN, VERONIKA, sciatica, peripheral neuropathy, seizures initially presenting to [facility] on 6/10/2025 complaining of progressively worsening shortness of breath for 1 month, admitted for acute decompensated heart failure exacerbation. She was receiving lasix IV for diuresis & Rocephin for PNA. Transferred to CCU at outside hospital, and subsequently transferred to [facility] CICU. Patient was on Bumex drip now discontinued with output of 2L since admitted.     Pulmonary consulted due to hypoxemia with pleural effusions. As per HF team, this is HF of unclear cause with Ef of 30-35% with no prior TTE on chart.     #HF on unclear cause  #Bilateral pleural effusions: L s/p thora 6/19 with exudate lymphocytic as per serum-pleural albumin and protein gradients (used on pts on diuresis to which Albumin gradient was 0.7 and Protein 2.4 (cut offs of more than 1.2 and 2.5 respectively for Albumin and Protein)   #VERONIKA with CPAP at home  - Patient with recurrent events of somnolence likely due to hypercarbia that improve with NIV   - s/p Thoracentesis L : follow up pleural cytology and flow   - CT chest with supraclav and mediastinal LN pending LN biopsy  - Please assess tolerance of patient to lay flat for upcoming procedures  - Repeat VBG to better titrate current BIPAP   - Please obtain bedside PFTs + pulse night oxymetry with Respiratory Therapy  - Please increase BIPAP to 12/5/40%   - Aspiration precautions, can start Albuterol nebs every 6h for airway clearance with Acapella  - Strict I/Os with goal of net negative with dosing as per primary team    - Ischemic work up as per Cards

## 2025-06-24 NOTE — PROGRESS NOTE ADULT - ATTENDING COMMENTS
70 yo F with PMHx DM, HTN, VERONIKA, sciatica, peripheral neuropathy, seizures initially presenting to [facility] on 6/10/2025 complaining of progressively worsening shortness of breath for 1 month, admitted for acute decompensated heart failure exacerbation.  Pulmonary consulted due to hypoxemia with pleural effusions. As per HF team, this is HF of unclear cause with EF of 30-35% w/ dyskinetic apex and with no prior TTE on chart.  Remains on 1-2L NC. Waxing/waning mental status of unclear etiology.   s/p LEFT thoracentesis 6/19 with removal of 600cc    #HF, etiology unknown  #Bilateral pleural effusions  #Volume overload  #Hypercapneic/Hypoxic resp failure  #MIKAELA  #Encephalopathy  #VERONIKA    Recommend:   - pleural fluid c/w exudate and lymph predominant, await cytology  - ordered for brain and cardiac MRI to evaluate for AMS and amyloidosis, respectively, however unable to perform d/t anxiety  - continue BIPAP overnight and when napping given persistent hypercapnea.  Mental status improved today   - Neurology following for AMS  - incentive spirometry  - PT eval, OOB to chair as tolerated .

## 2025-06-24 NOTE — PROGRESS NOTE ADULT - PROBLEM SELECTOR PLAN 5
None Hemoglobin a1c 5.3% on admission.   - Hold home oral hypoglycemic medication   - Insulin lispro sliding scale with meals and at bedtime   - Goal glucose 140-180 while inpatient

## 2025-06-24 NOTE — PROGRESS NOTE ADULT - PROBLEM SELECTOR PLAN 4
CT chest with L supraclavicular, mediastinal and hilar lymphadenopathy  - d/w pt and family re concern for cancer, wish to proceed with further eval.   - IR c/s for L supraclavicular bx. --> planned for today

## 2025-06-24 NOTE — PROGRESS NOTE ADULT - PROBLEM SELECTOR PLAN 2
On 6/14/25, TTE with EF 30-35% with wall motion abnormalities. Diuresed with Bumex drip earlier in hospitalization.   - Heparin drip discontinued - no LV thrombus on repeat TTE  - c/w entresto  - diuretics on hold given MIKAELA  - f/u HF recs  - pending cMRI, (planned for today) will need xanax before  - Will eventually need right and LHC

## 2025-06-24 NOTE — PRE-OP CHECKLIST - BOWEL PREP
As certified below, I, or a nurse practitioner or physician assistant working with me, had a face-to-face encounter that meets the physician face-to-face encounter requirements. n/a

## 2025-06-25 LAB
ANION GAP SERPL CALC-SCNC: 9 MMOL/L — SIGNIFICANT CHANGE UP (ref 5–17)
BUN SERPL-MCNC: 90 MG/DL — HIGH (ref 7–23)
CALCIUM SERPL-MCNC: 9.9 MG/DL — SIGNIFICANT CHANGE UP (ref 8.4–10.5)
CHLORIDE SERPL-SCNC: 98 MMOL/L — SIGNIFICANT CHANGE UP (ref 96–108)
CO2 SERPL-SCNC: 27 MMOL/L — SIGNIFICANT CHANGE UP (ref 22–31)
CREAT SERPL-MCNC: 1.27 MG/DL — SIGNIFICANT CHANGE UP (ref 0.5–1.3)
EGFR: 45 ML/MIN/1.73M2 — LOW
EGFR: 45 ML/MIN/1.73M2 — LOW
GLUCOSE BLDC GLUCOMTR-MCNC: 149 MG/DL — HIGH (ref 70–99)
GLUCOSE BLDC GLUCOMTR-MCNC: 165 MG/DL — HIGH (ref 70–99)
GLUCOSE BLDC GLUCOMTR-MCNC: 205 MG/DL — HIGH (ref 70–99)
GLUCOSE BLDC GLUCOMTR-MCNC: 223 MG/DL — HIGH (ref 70–99)
GLUCOSE SERPL-MCNC: 143 MG/DL — HIGH (ref 70–99)
POTASSIUM SERPL-MCNC: 5.1 MMOL/L — SIGNIFICANT CHANGE UP (ref 3.5–5.3)
POTASSIUM SERPL-SCNC: 5.1 MMOL/L — SIGNIFICANT CHANGE UP (ref 3.5–5.3)
SODIUM SERPL-SCNC: 134 MMOL/L — LOW (ref 135–145)

## 2025-06-25 PROCEDURE — 93010 ELECTROCARDIOGRAM REPORT: CPT

## 2025-06-25 PROCEDURE — 99232 SBSQ HOSP IP/OBS MODERATE 35: CPT | Mod: GC

## 2025-06-25 PROCEDURE — 99232 SBSQ HOSP IP/OBS MODERATE 35: CPT

## 2025-06-25 RX ORDER — MELATONIN 5 MG
3 TABLET ORAL AT BEDTIME
Refills: 0 | Status: DISCONTINUED | OUTPATIENT
Start: 2025-06-25 | End: 2025-07-07

## 2025-06-25 RX ADMIN — INSULIN LISPRO 2: 100 INJECTION, SOLUTION INTRAVENOUS; SUBCUTANEOUS at 11:48

## 2025-06-25 RX ADMIN — METOPROLOL SUCCINATE 25 MILLIGRAM(S): 50 TABLET, EXTENDED RELEASE ORAL at 05:11

## 2025-06-25 RX ADMIN — Medication 25 MILLIGRAM(S): at 05:13

## 2025-06-25 RX ADMIN — INSULIN LISPRO 2: 100 INJECTION, SOLUTION INTRAVENOUS; SUBCUTANEOUS at 07:31

## 2025-06-25 RX ADMIN — Medication 40 MILLIGRAM(S): at 05:11

## 2025-06-25 RX ADMIN — SACUBITRIL AND VALSARTAN 1 TABLET(S): 6; 6 PELLET ORAL at 05:11

## 2025-06-25 RX ADMIN — Medication 81 MILLIGRAM(S): at 17:49

## 2025-06-25 RX ADMIN — Medication 500 MILLIGRAM(S): at 17:50

## 2025-06-25 RX ADMIN — Medication 500 MILLIGRAM(S): at 05:11

## 2025-06-25 RX ADMIN — INSULIN LISPRO 1: 100 INJECTION, SOLUTION INTRAVENOUS; SUBCUTANEOUS at 17:48

## 2025-06-25 RX ADMIN — SACUBITRIL AND VALSARTAN 1 TABLET(S): 6; 6 PELLET ORAL at 17:49

## 2025-06-25 RX ADMIN — ATORVASTATIN CALCIUM 80 MILLIGRAM(S): 80 TABLET, FILM COATED ORAL at 21:17

## 2025-06-25 RX ADMIN — Medication 3 MILLIGRAM(S): at 21:17

## 2025-06-25 RX ADMIN — GABAPENTIN 200 MILLIGRAM(S): 400 CAPSULE ORAL at 05:11

## 2025-06-25 NOTE — PROGRESS NOTE ADULT - ASSESSMENT
69F with PMH of HTN, DM2, VERONIKA, sciatica, peripheral neuropathy, hx of seizure initially presented to [facility] on 6/10/25 with progressively worsening SOB, admitted for ADHF and possible pneumonia. Received IV diuretics (lasix) and antibiotics for pneumonia and was transferred to CCU. Ultimately transferred to [facility] CICU for further management. On arrival, she was on BIPAP & started on IV diuresis for respiratory distress & significant pulmonary edema. She has since been downgraded to telemetry & is on 2L NC. She has responded well to diuresis and GDMT. S/p thoracentesis 6/19. Her IVC today is small and collapsible. She is pending L/RHC for newly dx'ed CMP, cMRI without evidence of infiltrative disease. Also found to have new LAD c/f malignancy s/p biopsy 6/24.  Medicine and neurology to work up transient episodes of AMS.     Cardiac Studies:  - TTE 6/14/25: LVEF 30-35%, nml RV, TAPSE 2.0cm, nml LA/RA, no AR, no MR, trace TR, IVC nml 1.4cm  - TTE OSH: Reported LVEF 20% - need official records

## 2025-06-25 NOTE — PROGRESS NOTE ADULT - PROBLEM SELECTOR PLAN 1
- Etiology: unclear. Pt states she has NO prior h/o cardiomyopathy. She has +WMA on TTE, abnormal EKG and risk factors for CAD. She also had a recent CT chest with mediastinal lymphadenopathy  - TTE outside hospital show LVEF 20% but TTE 6/14 here with LVEF 30-35%.     - Will need eventual LHC/RHC vs CCTA for ischemic eval     - Amyloid w/u initiated (free light chains & immunofixation) - elevated lambda/clair, normal ratio, no monoclonal bands, PYP negative  - cMRI not consistent with infiltraive disease  - GDMT:     - ARNI: Entresto 24/26 mg BID.     - BB: Toprol 25 daily      - McGregor 25mg daily     - SGLT2i on discharge  - Diuretics: stop Bumex      - Strict I/Os and daily weights     - Appreciate pulm recs, s/p thora 6/19  - K > 4 & Mg > 2

## 2025-06-25 NOTE — PROGRESS NOTE ADULT - PROBLEM SELECTOR PLAN 2
No - Acute hypoxia likely i/s/o of pulmonary edema  - Was on bipap - now on 2LNC  - C/w DuoNebs & diuresis as above  - Appreciate pulm recs as above

## 2025-06-25 NOTE — PROGRESS NOTE ADULT - SUBJECTIVE AND OBJECTIVE BOX
CARDIOLOGY CONSULT PROGRESS NOTE  SHREE WYATT  MRN-[id number]    INTERVAL EVENTS:  - NAEO  - PYP negative for TTR Amyloid, cMRI not suspicious for infiltrative disease    ROS:  All other review of systems is negative unless indicated above    MEDICATIONS  (STANDING):  aspirin  chewable 81 milliGRAM(s) Oral daily  atorvastatin 80 milliGRAM(s) Oral at bedtime  divalproex  milliGRAM(s) Oral two times a day  gabapentin 200 milliGRAM(s) Oral three times a day  insulin lispro (ADMELOG) corrective regimen sliding scale   SubCutaneous three times a day before meals  insulin lispro (ADMELOG) corrective regimen sliding scale   SubCutaneous at bedtime  metoprolol succinate ER 25 milliGRAM(s) Oral daily  pantoprazole    Tablet 40 milliGRAM(s) Oral before breakfast  polyethylene glycol 3350 17 Gram(s) Oral at bedtime  sacubitril 24 mG/valsartan 26 mG 1 Tablet(s) Oral two times a day  senna 2 Tablet(s) Oral at bedtime  spironolactone 25 milliGRAM(s) Oral daily    MEDICATIONS  (PRN):  acetaminophen     Tablet .. 650 milliGRAM(s) Oral every 6 hours PRN Temp greater or equal to 38C (100.4F), Mild Pain (1 - 3)  aluminum hydroxide/magnesium hydroxide/simethicone Suspension 30 milliLiter(s) Oral every 4 hours PRN Dyspepsia  melatonin 3 milliGRAM(s) Oral at bedtime PRN Insomnia  ondansetron Injectable 4 milliGRAM(s) IV Push every 8 hours PRN Nausea and/or Vomiting    Allergies    shellfish (Other; Short breath)  No Known Drug Allergies    Intolerances    lactose (Unknown)    P/E:  Vital Signs Last 24 Hrs  T(C): 36.8 (2025 11:04), Max: 37.2 (2025 05:05)  T(F): 98.2 (2025 11:04), Max: 98.9 (2025 05:05)  HR: 83 (2025 11:04) (82 - 88)  BP: 109/67 (2025 11:04) (108/65 - 112/71)  BP(mean): --  RR: 18 (2025 11:04) (18 - 18)  SpO2: 99% (2025 11:04) (95% - 100%)    Parameters below as of 2025 11:04  Patient On (Oxygen Delivery Method): nasal cannula  O2 Flow (L/min): 2    Daily     Daily Weight in k.5 (2025 07:57)  I&O's Detail    2025 07:01  -  2025 07:00  --------------------------------------------------------  IN:    Oral Fluid: 680 mL  Total IN: 680 mL    OUT:    Voided (mL): 700 mL  Total OUT: 700 mL    Total NET: -20 mL        I&O's Summary    2025 07:01  -  2025 07:00  --------------------------------------------------------  IN: 680 mL / OUT: 700 mL / NET: -20 mL      - gen: well appearing, laying in hospital bed, NAD  - HEENT: MMM, NCAT  - neck: no JVD, supple  - heart: RRR, nml S1/S2, no RMG  - lungs: CTABL, nml WOB  - abd: soft, NTND, NABS  - ext: WWP, PPP, no ART    RELEVANT RECENT LABS/IMAGING/STUDIES:                        8.7    6.47  )-----------( 312      ( 2025 07:08 )             29.0     06-25    134[L]  |  98  |  90[H]  ----------------------------<  143[H]  5.1   |  27  |  1.27    Ca    9.9      2025 06:37          --------------------------------------        --------------------------------------

## 2025-06-25 NOTE — PROGRESS NOTE ADULT - PROBLEM SELECTOR PLAN 4
CT chest with L supraclavicular, mediastinal and hilar lymphadenopathy  - d/w pt and family re concern for cancer, wish to proceed with further eval.   - s/p L supraclavicular bx.

## 2025-06-25 NOTE — PROGRESS NOTE ADULT - ATTENDING COMMENTS
70 YO F with a history of HTN, bilateral CTS spinal stenosis s/p laminectomy, peripheral neuropathy who presented to [facility] with signs of heart failure and found to have LV dysfunction with LVEF 30-35%. She has had multiple episodes of altered mental status of unclear etiology.    She is euvolemic and tolerating GDMT. Workup (pending ischemic) for her cardiomyopathy is so far unrevealing.     # Acute systolic heart failure   -Etiology: K/L ratio normal and pyrophosphate scan negative functionally ruling out amyloid. cMRI without LGE. ischemic evaluation per cardiology team, consider CCTA vs [facility]. LN biopsy performed 6/24 with results pending.   -GDMT: continue entresto 24-26 BID, toprol XL 25, spironolactone 25 daily. start SGL2i on discharge  -Diuretics: euvolemic off diuretics  -Device: premature     HF team will follow peripherally as she is well compensated. Please notify us when she is nearing discharge so that we can arrange followup with the [place] HF team.

## 2025-06-25 NOTE — PROGRESS NOTE ADULT - SUBJECTIVE AND OBJECTIVE BOX
Andre Reyes, M.D.  Office: [phone]  Available thru [employer] Teams     Patient is a 69y old  Female who presents with a chief complaint of Acute decompensated heart failure (24 Jun 2025 21:46)          SUBJECTIVE / OVERNIGHT EVENTS:    No acute overnight events.        MEDICATIONS  (STANDING):  aspirin  chewable 81 milliGRAM(s) Oral daily  atorvastatin 80 milliGRAM(s) Oral at bedtime  divalproex  milliGRAM(s) Oral two times a day  gabapentin 200 milliGRAM(s) Oral three times a day  insulin lispro (ADMELOG) corrective regimen sliding scale   SubCutaneous three times a day before meals  insulin lispro (ADMELOG) corrective regimen sliding scale   SubCutaneous at bedtime  metoprolol succinate ER 25 milliGRAM(s) Oral daily  pantoprazole    Tablet 40 milliGRAM(s) Oral before breakfast  polyethylene glycol 3350 17 Gram(s) Oral at bedtime  sacubitril 24 mG/valsartan 26 mG 1 Tablet(s) Oral two times a day  senna 2 Tablet(s) Oral at bedtime  spironolactone 25 milliGRAM(s) Oral daily    MEDICATIONS  (PRN):  acetaminophen     Tablet .. 650 milliGRAM(s) Oral every 6 hours PRN Temp greater or equal to 38C (100.4F), Mild Pain (1 - 3)  aluminum hydroxide/magnesium hydroxide/simethicone Suspension 30 milliLiter(s) Oral every 4 hours PRN Dyspepsia  melatonin 3 milliGRAM(s) Oral at bedtime PRN Insomnia  ondansetron Injectable 4 milliGRAM(s) IV Push every 8 hours PRN Nausea and/or Vomiting          T(C): 37.2 (06-25 @ 05:05), Max: 37.2 (06-25 @ 05:05)   HR: 82   BP: 112/68   RR: 18   SpO2: 100%    PHYSICAL EXAM:    CONSTITUTIONAL: NAD, well-developed, well-groomed  EYES: PERRLA; conjunctiva and sclera clear  ENMT: Moist oral mucosa, no pharyngeal injection or exudates; normal dentition  RESPIRATORY: Normal respiratory effort; lungs are clear to auscultation bilaterally  CARDIOVASCULAR: Regular rate and rhythm, normal S1 and S2, no murmur/rub/gallop; No lower extremity edema; Peripheral pulses are 2+ bilaterally  ABDOMEN: Nontender to palpation, normoactive bowel sounds, no rebound/guarding; No hepatosplenomegaly  MUSCULOSKELETAL:  no clubbing or cyanosis of digits; no joint swelling or tenderness to palpation  PSYCH: A+O to person, place, and time; affect appropriate  NEUROLOGY: CN 2-12 are intact and symmetric; no gross sensory deficits       LABS:                        8.7    6.47  )-----------( 312      ( 24 Jun 2025 07:08 )             29.0      06-25    134[L]  |  98  |  90[H]  ----------------------------<  143[H]  5.1   |  27  |  1.27    Ca    9.9      25 Jun 2025 06:37         CAPILLARY BLOOD GLUCOSE      POCT Blood Glucose.: 205 mg/dL (25 Jun 2025 07:24)  POCT Blood Glucose.: 239 mg/dL (24 Jun 2025 21:19)  POCT Blood Glucose.: 141 mg/dL (24 Jun 2025 17:15)  POCT Blood Glucose.: 198 mg/dL (24 Jun 2025 11:23)      RADIOLOGY & ADDITIONAL TESTS:    Imaging Personally Reviewed:  Consultant(s) Notes Reviewed:    Care Discussed with Consultants/Other Providers:   Andre Reyes, M.D.  Office: [phone]  Available thru [employer] Teams     Patient is a 69y old  Female who presents with a chief complaint of Acute decompensated heart failure (24 Jun 2025 21:46)          SUBJECTIVE / OVERNIGHT EVENTS:    No acute overnight events.  no complaints      MEDICATIONS  (STANDING):  aspirin  chewable 81 milliGRAM(s) Oral daily  atorvastatin 80 milliGRAM(s) Oral at bedtime  divalproex  milliGRAM(s) Oral two times a day  gabapentin 200 milliGRAM(s) Oral three times a day  insulin lispro (ADMELOG) corrective regimen sliding scale   SubCutaneous three times a day before meals  insulin lispro (ADMELOG) corrective regimen sliding scale   SubCutaneous at bedtime  metoprolol succinate ER 25 milliGRAM(s) Oral daily  pantoprazole    Tablet 40 milliGRAM(s) Oral before breakfast  polyethylene glycol 3350 17 Gram(s) Oral at bedtime  sacubitril 24 mG/valsartan 26 mG 1 Tablet(s) Oral two times a day  senna 2 Tablet(s) Oral at bedtime  spironolactone 25 milliGRAM(s) Oral daily    MEDICATIONS  (PRN):  acetaminophen     Tablet .. 650 milliGRAM(s) Oral every 6 hours PRN Temp greater or equal to 38C (100.4F), Mild Pain (1 - 3)  aluminum hydroxide/magnesium hydroxide/simethicone Suspension 30 milliLiter(s) Oral every 4 hours PRN Dyspepsia  melatonin 3 milliGRAM(s) Oral at bedtime PRN Insomnia  ondansetron Injectable 4 milliGRAM(s) IV Push every 8 hours PRN Nausea and/or Vomiting          T(C): 37.2 (06-25 @ 05:05), Max: 37.2 (06-25 @ 05:05)   HR: 82   BP: 112/68   RR: 18   SpO2: 100%    PHYSICAL EXAM:    CONSTITUTIONAL: NAD, well-developed, well-groomed  EYES: PERRLA; conjunctiva and sclera clear  ENMT: Moist oral mucosa, no pharyngeal injection or exudates; normal dentition  RESPIRATORY: Normal respiratory effort; lungs are clear to auscultation bilaterally  CARDIOVASCULAR: Regular rate and rhythm, normal S1 and S2, no murmur/rub/gallop; No lower extremity edema; Peripheral pulses are 2+ bilaterally  ABDOMEN: Nontender to palpation, normoactive bowel sounds, no rebound/guarding; No hepatosplenomegaly  MUSCULOSKELETAL:  no clubbing or cyanosis of digits; no joint swelling or tenderness to palpation  PSYCH: A+O to person, place, and time; affect appropriate  NEUROLOGY: CN 2-12 are intact and symmetric; no gross sensory deficits       LABS:                        8.7    6.47  )-----------( 312      ( 24 Jun 2025 07:08 )             29.0      06-25    134[L]  |  98  |  90[H]  ----------------------------<  143[H]  5.1   |  27  |  1.27    Ca    9.9      25 Jun 2025 06:37         CAPILLARY BLOOD GLUCOSE      POCT Blood Glucose.: 205 mg/dL (25 Jun 2025 07:24)  POCT Blood Glucose.: 239 mg/dL (24 Jun 2025 21:19)  POCT Blood Glucose.: 141 mg/dL (24 Jun 2025 17:15)  POCT Blood Glucose.: 198 mg/dL (24 Jun 2025 11:23)      RADIOLOGY & ADDITIONAL TESTS:    Imaging Personally Reviewed:  Consultant(s) Notes Reviewed:    Care Discussed with Consultants/Other Providers:

## 2025-06-25 NOTE — PROGRESS NOTE ADULT - PROBLEM SELECTOR PLAN 2
On 6/14/25, TTE with EF 30-35% with wall motion abnormalities. Diuresed with Bumex drip earlier in hospitalization.   - Heparin drip discontinued - no LV thrombus on repeat TTE  - c/w entresto  - diuretics on hold given MIKAELA  - f/u HF recs  - s/p cMRI and Nuclear Scan - negative for infiltrative diease  - Will eventually need right and LHC

## 2025-06-26 LAB
ANION GAP SERPL CALC-SCNC: 9 MMOL/L — SIGNIFICANT CHANGE UP (ref 5–17)
BASE EXCESS BLDA CALC-SCNC: 5.6 MMOL/L — HIGH (ref -2–3)
BUN SERPL-MCNC: 74 MG/DL — HIGH (ref 7–23)
CALCIUM SERPL-MCNC: 10.4 MG/DL — SIGNIFICANT CHANGE UP (ref 8.4–10.5)
CHLORIDE SERPL-SCNC: 101 MMOL/L — SIGNIFICANT CHANGE UP (ref 96–108)
CO2 BLDA-SCNC: 32 MMOL/L — HIGH (ref 19–24)
CO2 SERPL-SCNC: 28 MMOL/L — SIGNIFICANT CHANGE UP (ref 22–31)
CREAT SERPL-MCNC: 1.14 MG/DL — SIGNIFICANT CHANGE UP (ref 0.5–1.3)
EGFR: 52 ML/MIN/1.73M2 — LOW
EGFR: 52 ML/MIN/1.73M2 — LOW
GLUCOSE BLDC GLUCOMTR-MCNC: 125 MG/DL — HIGH (ref 70–99)
GLUCOSE BLDC GLUCOMTR-MCNC: 129 MG/DL — HIGH (ref 70–99)
GLUCOSE BLDC GLUCOMTR-MCNC: 142 MG/DL — HIGH (ref 70–99)
GLUCOSE BLDC GLUCOMTR-MCNC: 213 MG/DL — HIGH (ref 70–99)
GLUCOSE SERPL-MCNC: 126 MG/DL — HIGH (ref 70–99)
HCO3 BLDA-SCNC: 31 MMOL/L — HIGH (ref 21–28)
HOROWITZ INDEX BLDA+IHG-RTO: 21 — SIGNIFICANT CHANGE UP
NON-GYNECOLOGICAL CYTOLOGY STUDY: SIGNIFICANT CHANGE UP
PCO2 BLDA: 45 MMHG — SIGNIFICANT CHANGE UP (ref 32–45)
PH BLDA: 7.44 — SIGNIFICANT CHANGE UP (ref 7.35–7.45)
PO2 BLDA: 71 MMHG — LOW (ref 83–108)
POTASSIUM SERPL-MCNC: 5.6 MMOL/L — HIGH (ref 3.5–5.3)
POTASSIUM SERPL-SCNC: 5.6 MMOL/L — HIGH (ref 3.5–5.3)
SAO2 % BLDA: 95.4 % — SIGNIFICANT CHANGE UP (ref 94–98)
SODIUM SERPL-SCNC: 138 MMOL/L — SIGNIFICANT CHANGE UP (ref 135–145)

## 2025-06-26 PROCEDURE — 99233 SBSQ HOSP IP/OBS HIGH 50: CPT

## 2025-06-26 RX ORDER — IPRATROPIUM BROMIDE AND ALBUTEROL SULFATE .5; 2.5 MG/3ML; MG/3ML
3 SOLUTION RESPIRATORY (INHALATION) ONCE
Refills: 0 | Status: COMPLETED | OUTPATIENT
Start: 2025-06-26 | End: 2025-06-26

## 2025-06-26 RX ORDER — SODIUM ZIRCONIUM CYCLOSILICATE 5 G/5G
10 POWDER, FOR SUSPENSION ORAL ONCE
Refills: 0 | Status: COMPLETED | OUTPATIENT
Start: 2025-06-26 | End: 2025-06-26

## 2025-06-26 RX ADMIN — SODIUM ZIRCONIUM CYCLOSILICATE 10 GRAM(S): 5 POWDER, FOR SUSPENSION ORAL at 18:44

## 2025-06-26 RX ADMIN — Medication 25 MILLIGRAM(S): at 05:42

## 2025-06-26 RX ADMIN — IPRATROPIUM BROMIDE AND ALBUTEROL SULFATE 3 MILLILITER(S): .5; 2.5 SOLUTION RESPIRATORY (INHALATION) at 14:19

## 2025-06-26 RX ADMIN — Medication 3 MILLIGRAM(S): at 21:14

## 2025-06-26 RX ADMIN — ATORVASTATIN CALCIUM 80 MILLIGRAM(S): 80 TABLET, FILM COATED ORAL at 21:14

## 2025-06-26 RX ADMIN — Medication 40 MILLIGRAM(S): at 05:43

## 2025-06-26 RX ADMIN — Medication 2 TABLET(S): at 21:14

## 2025-06-26 RX ADMIN — Medication 500 MILLIGRAM(S): at 05:41

## 2025-06-26 RX ADMIN — Medication 81 MILLIGRAM(S): at 12:06

## 2025-06-26 RX ADMIN — SACUBITRIL AND VALSARTAN 1 TABLET(S): 6; 6 PELLET ORAL at 05:42

## 2025-06-26 RX ADMIN — Medication 500 MILLIGRAM(S): at 18:44

## 2025-06-26 RX ADMIN — METOPROLOL SUCCINATE 25 MILLIGRAM(S): 50 TABLET, EXTENDED RELEASE ORAL at 05:42

## 2025-06-26 RX ADMIN — SACUBITRIL AND VALSARTAN 1 TABLET(S): 6; 6 PELLET ORAL at 17:10

## 2025-06-26 NOTE — PROGRESS NOTE ADULT - PROBLEM SELECTOR PLAN 2
On 6/14/25, TTE with EF 30-35% with wall motion abnormalities. Diuresed with Bumex drip earlier in hospitalization.   - c/w entresto  - diuretics on hold given MIKAELA  - f/u HF recs  - s/p cMRI and Nuclear Scan - negative for infiltrative diease  - Will eventually need right and LHC

## 2025-06-26 NOTE — PROGRESS NOTE ADULT - SUBJECTIVE AND OBJECTIVE BOX
Andre Reyes, M.D.  Office: [phone]  Available thru [employer] Teams     Patient is a 69y old  Female who presents with a chief complaint of Acute decompensated heart failure (25 Jun 2025 13:14)          SUBJECTIVE / OVERNIGHT EVENTS:    No acute overnight events.        MEDICATIONS  (STANDING):  aspirin  chewable 81 milliGRAM(s) Oral daily  atorvastatin 80 milliGRAM(s) Oral at bedtime  divalproex  milliGRAM(s) Oral two times a day  insulin lispro (ADMELOG) corrective regimen sliding scale   SubCutaneous three times a day before meals  insulin lispro (ADMELOG) corrective regimen sliding scale   SubCutaneous at bedtime  melatonin 3 milliGRAM(s) Oral at bedtime  metoprolol succinate ER 25 milliGRAM(s) Oral daily  pantoprazole    Tablet 40 milliGRAM(s) Oral before breakfast  polyethylene glycol 3350 17 Gram(s) Oral at bedtime  sacubitril 24 mG/valsartan 26 mG 1 Tablet(s) Oral two times a day  senna 2 Tablet(s) Oral at bedtime  spironolactone 25 milliGRAM(s) Oral daily    MEDICATIONS  (PRN):  acetaminophen     Tablet .. 650 milliGRAM(s) Oral every 6 hours PRN Temp greater or equal to 38C (100.4F), Mild Pain (1 - 3)  aluminum hydroxide/magnesium hydroxide/simethicone Suspension 30 milliLiter(s) Oral every 4 hours PRN Dyspepsia  ondansetron Injectable 4 milliGRAM(s) IV Push every 8 hours PRN Nausea and/or Vomiting          T(C): 36.8 (06-26 @ 05:46), Max: 36.8 (06-25 @ 11:04)   HR: 87   BP: 125/73   RR: 18   SpO2: 96%    PHYSICAL EXAM:    CONSTITUTIONAL: NAD, well-developed, well-groomed  EYES: PERRLA; conjunctiva and sclera clear  ENMT: Moist oral mucosa, no pharyngeal injection or exudates; normal dentition  RESPIRATORY: Normal respiratory effort; lungs are clear to auscultation bilaterally  CARDIOVASCULAR: Regular rate and rhythm, normal S1 and S2, no murmur/rub/gallop; No lower extremity edema; Peripheral pulses are 2+ bilaterally  ABDOMEN: Nontender to palpation, normoactive bowel sounds, no rebound/guarding; No hepatosplenomegaly  MUSCULOSKELETAL:  no clubbing or cyanosis of digits; no joint swelling or tenderness to palpation  PSYCH: A+O to person, place, and time; affect appropriate  NEUROLOGY: CN 2-12 are intact and symmetric; no gross sensory deficits       LABS:     06-26    138  |  101  |  74[H]  ----------------------------<  126[H]  5.6[H]   |  28  |  1.14    Ca    10.4      26 Jun 2025 05:56         CAPILLARY BLOOD GLUCOSE      POCT Blood Glucose.: 129 mg/dL (26 Jun 2025 07:41)  POCT Blood Glucose.: 149 mg/dL (25 Jun 2025 21:12)  POCT Blood Glucose.: 165 mg/dL (25 Jun 2025 17:03)  POCT Blood Glucose.: 223 mg/dL (25 Jun 2025 11:28)      RADIOLOGY & ADDITIONAL TESTS:    Imaging Personally Reviewed:  Consultant(s) Notes Reviewed:    Care Discussed with Consultants/Other Providers:   Andre Reyes, M.D.  Office: [phone]  Available thru [employer] Teams     Patient is a 69y old  Female who presents with a chief complaint of Acute decompensated heart failure (25 Jun 2025 13:14)          SUBJECTIVE / OVERNIGHT EVENTS:    No acute overnight events.  No complaints        MEDICATIONS  (STANDING):  aspirin  chewable 81 milliGRAM(s) Oral daily  atorvastatin 80 milliGRAM(s) Oral at bedtime  divalproex  milliGRAM(s) Oral two times a day  insulin lispro (ADMELOG) corrective regimen sliding scale   SubCutaneous three times a day before meals  insulin lispro (ADMELOG) corrective regimen sliding scale   SubCutaneous at bedtime  melatonin 3 milliGRAM(s) Oral at bedtime  metoprolol succinate ER 25 milliGRAM(s) Oral daily  pantoprazole    Tablet 40 milliGRAM(s) Oral before breakfast  polyethylene glycol 3350 17 Gram(s) Oral at bedtime  sacubitril 24 mG/valsartan 26 mG 1 Tablet(s) Oral two times a day  senna 2 Tablet(s) Oral at bedtime  spironolactone 25 milliGRAM(s) Oral daily    MEDICATIONS  (PRN):  acetaminophen     Tablet .. 650 milliGRAM(s) Oral every 6 hours PRN Temp greater or equal to 38C (100.4F), Mild Pain (1 - 3)  aluminum hydroxide/magnesium hydroxide/simethicone Suspension 30 milliLiter(s) Oral every 4 hours PRN Dyspepsia  ondansetron Injectable 4 milliGRAM(s) IV Push every 8 hours PRN Nausea and/or Vomiting          T(C): 36.8 (06-26 @ 05:46), Max: 36.8 (06-25 @ 11:04)   HR: 87   BP: 125/73   RR: 18   SpO2: 96%    PHYSICAL EXAM:    CONSTITUTIONAL: NAD, well-developed, well-groomed  EYES: PERRLA; conjunctiva and sclera clear  ENMT: Moist oral mucosa, no pharyngeal injection or exudates; normal dentition  RESPIRATORY: Normal respiratory effort; lungs are clear to auscultation bilaterally  CARDIOVASCULAR: Regular rate and rhythm, normal S1 and S2, no murmur/rub/gallop; No lower extremity edema; Peripheral pulses are 2+ bilaterally  ABDOMEN: Nontender to palpation, normoactive bowel sounds, no rebound/guarding; No hepatosplenomegaly  MUSCULOSKELETAL:  no clubbing or cyanosis of digits; no joint swelling or tenderness to palpation  PSYCH: A+O to person, place, and time; affect appropriate  NEUROLOGY: CN 2-12 are intact and symmetric; no gross sensory deficits       LABS:     06-26    138  |  101  |  74[H]  ----------------------------<  126[H]  5.6[H]   |  28  |  1.14    Ca    10.4      26 Jun 2025 05:56         CAPILLARY BLOOD GLUCOSE      POCT Blood Glucose.: 129 mg/dL (26 Jun 2025 07:41)  POCT Blood Glucose.: 149 mg/dL (25 Jun 2025 21:12)  POCT Blood Glucose.: 165 mg/dL (25 Jun 2025 17:03)  POCT Blood Glucose.: 223 mg/dL (25 Jun 2025 11:28)      RADIOLOGY & ADDITIONAL TESTS:    Imaging Personally Reviewed:  Consultant(s) Notes Reviewed:    Care Discussed with Consultants/Other Providers:

## 2025-06-26 NOTE — CHART NOTE - NSCHARTNOTEFT_GEN_A_CORE
Brief Heart Failure Chart Note    Asked by primary team about timing of ischemic eval.    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 Due to possible need for surgery/procedures, would pursue noninvasive ischemic eval to avoid use of DAPT if able.    Recommend CCTA, will follow up results.     Workup of malignancy per primary team    Driss Brown MD  Cardiology Fellow

## 2025-06-26 NOTE — PROGRESS NOTE ADULT - PROBLEM SELECTOR PLAN 1
resolved. patient now on room air  AMS improved with BIPAP so it was continued.   Supposed to use CPAP at home, but does not use. VBG on 6/15/25 while sleeping shows carbon dioxide retention, likely caused obtunded status.   - BIPAP nocturnal while inpatient then CPAP at home

## 2025-06-26 NOTE — PROGRESS NOTE ADULT - PROBLEM SELECTOR PLAN 4
CT chest with L supraclavicular, mediastinal and hilar lymphadenopathy  - d/w pt and family re concern for cancer, wish to proceed with further eval.   - s/p L supraclavicular bx. --> path showing adenocarcinoma, pending stains --> I consulted Oncology ABR (auditory brainstem response)

## 2025-06-27 LAB
ANION GAP SERPL CALC-SCNC: 9 MMOL/L — SIGNIFICANT CHANGE UP (ref 5–17)
BUN SERPL-MCNC: 62 MG/DL — HIGH (ref 7–23)
CALCIUM SERPL-MCNC: 10.6 MG/DL — HIGH (ref 8.4–10.5)
CHLORIDE SERPL-SCNC: 102 MMOL/L — SIGNIFICANT CHANGE UP (ref 96–108)
CO2 SERPL-SCNC: 29 MMOL/L — SIGNIFICANT CHANGE UP (ref 22–31)
CREAT SERPL-MCNC: 1.16 MG/DL — SIGNIFICANT CHANGE UP (ref 0.5–1.3)
EGFR: 51 ML/MIN/1.73M2 — LOW
EGFR: 51 ML/MIN/1.73M2 — LOW
GLUCOSE BLDC GLUCOMTR-MCNC: 117 MG/DL — HIGH (ref 70–99)
GLUCOSE BLDC GLUCOMTR-MCNC: 119 MG/DL — HIGH (ref 70–99)
GLUCOSE BLDC GLUCOMTR-MCNC: 133 MG/DL — HIGH (ref 70–99)
GLUCOSE BLDC GLUCOMTR-MCNC: 134 MG/DL — HIGH (ref 70–99)
GLUCOSE SERPL-MCNC: 106 MG/DL — HIGH (ref 70–99)
POTASSIUM SERPL-MCNC: 5.5 MMOL/L — HIGH (ref 3.5–5.3)
POTASSIUM SERPL-SCNC: 5.5 MMOL/L — HIGH (ref 3.5–5.3)
SODIUM SERPL-SCNC: 140 MMOL/L — SIGNIFICANT CHANGE UP (ref 135–145)

## 2025-06-27 PROCEDURE — 99223 1ST HOSP IP/OBS HIGH 75: CPT | Mod: GC

## 2025-06-27 PROCEDURE — 99233 SBSQ HOSP IP/OBS HIGH 50: CPT

## 2025-06-27 RX ORDER — METOPROLOL SUCCINATE 50 MG/1
5 TABLET, EXTENDED RELEASE ORAL ONCE
Refills: 0 | Status: DISCONTINUED | OUTPATIENT
Start: 2025-06-27 | End: 2025-07-02

## 2025-06-27 RX ORDER — METOPROLOL SUCCINATE 50 MG/1
25 TABLET, EXTENDED RELEASE ORAL ONCE
Refills: 0 | Status: COMPLETED | OUTPATIENT
Start: 2025-06-27 | End: 2025-06-27

## 2025-06-27 RX ORDER — FUROSEMIDE 10 MG/ML
40 INJECTION INTRAMUSCULAR; INTRAVENOUS DAILY
Refills: 0 | Status: DISCONTINUED | OUTPATIENT
Start: 2025-06-27 | End: 2025-07-03

## 2025-06-27 RX ORDER — SODIUM ZIRCONIUM CYCLOSILICATE 5 G/5G
10 POWDER, FOR SUSPENSION ORAL ONCE
Refills: 0 | Status: COMPLETED | OUTPATIENT
Start: 2025-06-27 | End: 2025-06-27

## 2025-06-27 RX ADMIN — SACUBITRIL AND VALSARTAN 1 TABLET(S): 6; 6 PELLET ORAL at 05:56

## 2025-06-27 RX ADMIN — Medication 500 MILLIGRAM(S): at 05:57

## 2025-06-27 RX ADMIN — METOPROLOL SUCCINATE 25 MILLIGRAM(S): 50 TABLET, EXTENDED RELEASE ORAL at 05:56

## 2025-06-27 RX ADMIN — SODIUM ZIRCONIUM CYCLOSILICATE 10 GRAM(S): 5 POWDER, FOR SUSPENSION ORAL at 12:48

## 2025-06-27 RX ADMIN — Medication 2 TABLET(S): at 21:20

## 2025-06-27 RX ADMIN — Medication 500 MILLIGRAM(S): at 18:53

## 2025-06-27 RX ADMIN — SACUBITRIL AND VALSARTAN 1 TABLET(S): 6; 6 PELLET ORAL at 19:01

## 2025-06-27 RX ADMIN — METOPROLOL SUCCINATE 25 MILLIGRAM(S): 50 TABLET, EXTENDED RELEASE ORAL at 15:06

## 2025-06-27 RX ADMIN — Medication 3 MILLIGRAM(S): at 21:20

## 2025-06-27 RX ADMIN — Medication 40 MILLIGRAM(S): at 05:57

## 2025-06-27 RX ADMIN — FUROSEMIDE 40 MILLIGRAM(S): 10 INJECTION INTRAMUSCULAR; INTRAVENOUS at 12:51

## 2025-06-27 RX ADMIN — Medication 81 MILLIGRAM(S): at 11:31

## 2025-06-27 RX ADMIN — ATORVASTATIN CALCIUM 80 MILLIGRAM(S): 80 TABLET, FILM COATED ORAL at 21:19

## 2025-06-27 NOTE — PROGRESS NOTE ADULT - PROBLEM SELECTOR PLAN 4
CT chest with L supraclavicular, mediastinal and hilar lymphadenopathy  - d/w pt and family re concern for cancer, wish to proceed with further eval.   - s/p L supraclavicular bx. --> path showing adenocarcinoma, pending stains --> I consulted Oncology

## 2025-06-27 NOTE — PROGRESS NOTE ADULT - ASSESSMENT
69F with PMH of HTN, DM2, VERONIKA, sciatica, peripheral neuropathy, hx of seizure initially presented to [facility] on 6/10/25 with progressively worsening SOB, admitted for ADHF and possible pneumonia. EKG LBBB  Received IV diuretics (lasix) and antibiotics for pneumonia and was transferred to CCU. Ultimately transferred to [facility] CICU for further management. On arrival, she was on BIPAP & started on IV diuresis for respiratory distress & significant pulmonary edema. She has since been downgraded to telemetry & is on 2L NC. She has responded well to diuresis and GDMT. S/p thoracentesis 6/19    SHe is now s/p cMR and NM amyloid scan which are negative for infiltrative disease . however biopsy is + for adenocarcinoma    Her Bumex has been held, and she feels short of breath      advise    1  Check CXr, check BNP. IF evidence of CHF,  consider start lasix 20- 40 mg IV daily, f u CHF service  2. CHF service feels A CTA coronary would be sufficient to exclude obstructive CAD, please arrange ? will this be adequate in light of resting HR 80s  3/ continue remander meds  4. in light of malignancy w /u hold on CRT D at this time

## 2025-06-27 NOTE — PROGRESS NOTE ADULT - PROBLEM SELECTOR PLAN 1
resolved. patient now on room air  AMS improved with BIPAP so it was continued.   Supposed to use CPAP at home, but does not use. VBG on 6/15/25 while sleeping shows carbon dioxide retention, likely caused obtunded status.   - BIPAP nocturnal while inpatient then CPAP at home  - today she is more short of breath. will start lasix 40mg PO daily

## 2025-06-27 NOTE — PROGRESS NOTE ADULT - PROBLEM SELECTOR PLAN 3
MIKAELA on CKD3b  Renal function much improved  Creatinine Trend: 1.16<--, 1.14<--, 1.27<--, 1.48<--, 1.36<--, 1.74<--

## 2025-06-27 NOTE — PROGRESS NOTE ADULT - SUBJECTIVE AND OBJECTIVE BOX
Andre Reyes, M.D.  Office: [phone]  Available thru [employer] Teams     Patient is a 69y old  Female who presents with a chief complaint of Acute decompensated heart failure (27 Jun 2025 07:01)          SUBJECTIVE / OVERNIGHT EVENTS:    No acute overnight events.        MEDICATIONS  (STANDING):  aspirin  chewable 81 milliGRAM(s) Oral daily  atorvastatin 80 milliGRAM(s) Oral at bedtime  divalproex  milliGRAM(s) Oral two times a day  insulin lispro (ADMELOG) corrective regimen sliding scale   SubCutaneous three times a day before meals  insulin lispro (ADMELOG) corrective regimen sliding scale   SubCutaneous at bedtime  melatonin 3 milliGRAM(s) Oral at bedtime  metoprolol succinate ER 25 milliGRAM(s) Oral daily  metoprolol tartrate Injectable 5 milliGRAM(s) IV Push once  pantoprazole    Tablet 40 milliGRAM(s) Oral before breakfast  polyethylene glycol 3350 17 Gram(s) Oral at bedtime  sacubitril 24 mG/valsartan 26 mG 1 Tablet(s) Oral two times a day  senna 2 Tablet(s) Oral at bedtime    MEDICATIONS  (PRN):  acetaminophen     Tablet .. 650 milliGRAM(s) Oral every 6 hours PRN Temp greater or equal to 38C (100.4F), Mild Pain (1 - 3)  aluminum hydroxide/magnesium hydroxide/simethicone Suspension 30 milliLiter(s) Oral every 4 hours PRN Dyspepsia  ondansetron Injectable 4 milliGRAM(s) IV Push every 8 hours PRN Nausea and/or Vomiting          T(C): 36.6 (06-27 @ 05:18), Max: 37.2 (06-26 @ 20:39)   HR: 87   BP: 127/75   RR: 18   SpO2: 100%    PHYSICAL EXAM:    CONSTITUTIONAL: NAD, well-developed, well-groomed  EYES: PERRLA; conjunctiva and sclera clear  ENMT: Moist oral mucosa, no pharyngeal injection or exudates; normal dentition  RESPIRATORY: Normal respiratory effort; lungs are clear to auscultation bilaterally  CARDIOVASCULAR: Regular rate and rhythm, normal S1 and S2, no murmur/rub/gallop; No lower extremity edema; Peripheral pulses are 2+ bilaterally  ABDOMEN: Nontender to palpation, normoactive bowel sounds, no rebound/guarding; No hepatosplenomegaly  MUSCULOSKELETAL:  no clubbing or cyanosis of digits; no joint swelling or tenderness to palpation  PSYCH: A+O to person, place, and time; affect appropriate  NEUROLOGY: CN 2-12 are intact and symmetric; no gross sensory deficits       LABS:     06-27    140  |  102  |  62[H]  ----------------------------<  106[H]  5.5[H]   |  29  |  1.16    Ca    10.6[H]      27 Jun 2025 06:28         CAPILLARY BLOOD GLUCOSE      POCT Blood Glucose.: 117 mg/dL (27 Jun 2025 07:18)  POCT Blood Glucose.: 213 mg/dL (26 Jun 2025 20:59)  POCT Blood Glucose.: 142 mg/dL (26 Jun 2025 17:05)  POCT Blood Glucose.: 125 mg/dL (26 Jun 2025 11:39)      RADIOLOGY & ADDITIONAL TESTS:    Imaging Personally Reviewed:  Consultant(s) Notes Reviewed:    Care Discussed with Consultants/Other Providers:   Andre Reyes, M.D.  Office: [phone]  Available thru Microsoft Teams     Patient is a 69y old  Female who presents with a chief complaint of Acute decompensated heart failure (27 Jun 2025 07:01)          SUBJECTIVE / OVERNIGHT EVENTS:    No acute overnight events.  She reports having more difficultly with breathing.        MEDICATIONS  (STANDING):  aspirin  chewable 81 milliGRAM(s) Oral daily  atorvastatin 80 milliGRAM(s) Oral at bedtime  divalproex  milliGRAM(s) Oral two times a day  insulin lispro (ADMELOG) corrective regimen sliding scale   SubCutaneous three times a day before meals  insulin lispro (ADMELOG) corrective regimen sliding scale   SubCutaneous at bedtime  melatonin 3 milliGRAM(s) Oral at bedtime  metoprolol succinate ER 25 milliGRAM(s) Oral daily  metoprolol tartrate Injectable 5 milliGRAM(s) IV Push once  pantoprazole    Tablet 40 milliGRAM(s) Oral before breakfast  polyethylene glycol 3350 17 Gram(s) Oral at bedtime  sacubitril 24 mG/valsartan 26 mG 1 Tablet(s) Oral two times a day  senna 2 Tablet(s) Oral at bedtime    MEDICATIONS  (PRN):  acetaminophen     Tablet .. 650 milliGRAM(s) Oral every 6 hours PRN Temp greater or equal to 38C (100.4F), Mild Pain (1 - 3)  aluminum hydroxide/magnesium hydroxide/simethicone Suspension 30 milliLiter(s) Oral every 4 hours PRN Dyspepsia  ondansetron Injectable 4 milliGRAM(s) IV Push every 8 hours PRN Nausea and/or Vomiting          T(C): 36.6 (06-27 @ 05:18), Max: 37.2 (06-26 @ 20:39)   HR: 87   BP: 127/75   RR: 18   SpO2: 100%    PHYSICAL EXAM:    CONSTITUTIONAL: NAD, well-developed, well-groomed  EYES: PERRLA; conjunctiva and sclera clear  ENMT: Moist oral mucosa, no pharyngeal injection or exudates; normal dentition  RESPIRATORY: Normal respiratory effort; lungs are clear to auscultation bilaterally  CARDIOVASCULAR: Regular rate and rhythm, normal S1 and S2, no murmur/rub/gallop; 1+ lower extremity edema; Peripheral pulses are 2+ bilaterally  ABDOMEN: Nontender to palpation, normoactive bowel sounds, no rebound/guarding; No hepatosplenomegaly  MUSCULOSKELETAL:  no clubbing or cyanosis of digits; no joint swelling or tenderness to palpation  PSYCH: A+O to person, place, and time; affect appropriate  NEUROLOGY: CN 2-12 are intact and symmetric; no gross sensory deficits       LABS:     06-27    140  |  102  |  62[H]  ----------------------------<  106[H]  5.5[H]   |  29  |  1.16    Ca    10.6[H]      27 Jun 2025 06:28         CAPILLARY BLOOD GLUCOSE      POCT Blood Glucose.: 117 mg/dL (27 Jun 2025 07:18)  POCT Blood Glucose.: 213 mg/dL (26 Jun 2025 20:59)  POCT Blood Glucose.: 142 mg/dL (26 Jun 2025 17:05)  POCT Blood Glucose.: 125 mg/dL (26 Jun 2025 11:39)      RADIOLOGY & ADDITIONAL TESTS:    Imaging Personally Reviewed:  Consultant(s) Notes Reviewed:    Care Discussed with Consultants/Other Providers:

## 2025-06-27 NOTE — PROGRESS NOTE ADULT - SUBJECTIVE AND OBJECTIVE BOX
INTERVAL HPI/OVERNIGHT EVENTS: patient c/o intermittent dyspnea, no chest pain, + adenocarcinoma    MEDICATIONS  (STANDING):  aspirin  chewable 81 milliGRAM(s) Oral daily  atorvastatin 80 milliGRAM(s) Oral at bedtime  divalproex  milliGRAM(s) Oral two times a day  insulin lispro (ADMELOG) corrective regimen sliding scale   SubCutaneous three times a day before meals  insulin lispro (ADMELOG) corrective regimen sliding scale   SubCutaneous at bedtime  melatonin 3 milliGRAM(s) Oral at bedtime  metoprolol succinate ER 25 milliGRAM(s) Oral daily  pantoprazole    Tablet 40 milliGRAM(s) Oral before breakfast  polyethylene glycol 3350 17 Gram(s) Oral at bedtime  sacubitril 24 mG/valsartan 26 mG 1 Tablet(s) Oral two times a day  senna 2 Tablet(s) Oral at bedtime    MEDICATIONS  (PRN):  acetaminophen     Tablet .. 650 milliGRAM(s) Oral every 6 hours PRN Temp greater or equal to 38C (100.4F), Mild Pain (1 - 3)  aluminum hydroxide/magnesium hydroxide/simethicone Suspension 30 milliLiter(s) Oral every 4 hours PRN Dyspepsia  ondansetron Injectable 4 milliGRAM(s) IV Push every 8 hours PRN Nausea and/or Vomiting      Allergies    shellfish (Other; Short breath)  No Known Drug Allergies    Intolerances    lactose (Unknown)    ROS:  General: Pt denies recent weight loss/fever/chills    Neurological: denies numbness or  sensation loss    Cardiovascular: denies chest pain/palpitations/leg edema    Respiratory and Thorax: denies SOB/cough/wheezing    Gastrointestinal: denies abdominal pain/diarrhea/constipation/bloody stool    Genitourinary: denies urinary frequency/urgency/ dysuria    Musculoskeletal: denies joint pain or swelling, denies restricted motion    Hematologic: denies abnormal bleeding  	    	  	    		        	    	            Vital Signs Last 24 Hrs  T(C): 36.6 (2025 05:18), Max: 37.2 (2025 20:39)  T(F): 97.9 (2025 05:18), Max: 98.9 (2025 20:39)  HR: 89 (2025 05:18) (80 - 95)  BP: 127/75 (2025 05:18) (113/69 - 135/62)  BP(mean): --  RR: 18 (2025 05:18) (18 - 18)  SpO2: 100% (2025 05:18) (94% - 100%)    Parameters below as of 2025 05:18  Patient On (Oxygen Delivery Method): nasal cannula  O2 Flow (L/min): 1.5    Daily     Daily Weight in k (2025 07:23)     @ 07:01  -   @ 07:00  --------------------------------------------------------  IN: 400 mL / OUT: 950 mL / NET: -550 mL      Physical Exam:    wwn female  noJVD  cor RRR  lung clear  abd soft   ext no edema      LABS:        140  |  102  |  62[H]  ----------------------------<  106[H]  5.5[H]   |  29  |  1.16    Ca    10.6[H]      2025 06:28        Urinalysis Basic - ( 2025 06:28 )    Color: x / Appearance: x / SG: x / pH: x  Gluc: 106 mg/dL / Ketone: x  / Bili: x / Urobili: x   Blood: x / Protein: x / Nitrite: x   Leuk Esterase: x / RBC: x / WBC x   Sq Epi: x / Non Sq Epi: x / Bacteria: x        RADIOLOGY & ADDITIONAL TESTS:    TELE:    EKG:

## 2025-06-27 NOTE — CONSULT NOTE ADULT - SUBJECTIVE AND OBJECTIVE BOX
HPI (admission)    69F with PMHx DM, HTN, VERONIKA, sciatica, peripheral neuropathy, seizures initially presenting to [facility] on 6/10/2025 complaining of progressively worsening shortness of breath x1 month, admitted for acute decompensated heart failure exacerbation and possible pneumonia. She was receiving lasix IV for diuresis & Rocephin for PNA. Transferred to CCU at outside hospital, and subsequently transferred to [facility] CICU.  Upon arrival to [facility] CICU, patient on bipap, appears comfortable, hemodynamically stable.       PAST MEDICAL & SURGICAL HISTORY:    HTN (hypertension)  on medication      Sleep apnea  on CPAP x 10-15 years      Seizure disorder  on medication  stable no episode x 15 years      Diabetes  on medications      Morbid obesity      S/P tonsillectomy  childhood      History of hysterectomy  20 years ago      Bilateral carpal tunnel syndrome  bilateral carpal tunnel release 2016      Acquired cataract  bialteral surgery with lens implants          Allergies    shellfish (Other; Short breath)  No Known Drug Allergies    Intolerances    lactose (Unknown)      MEDICATIONS  (STANDING):  aspirin  chewable 81 milliGRAM(s) Oral daily  atorvastatin 80 milliGRAM(s) Oral at bedtime  divalproex  milliGRAM(s) Oral two times a day  furosemide    Tablet 40 milliGRAM(s) Oral daily  insulin lispro (ADMELOG) corrective regimen sliding scale   SubCutaneous three times a day before meals  insulin lispro (ADMELOG) corrective regimen sliding scale   SubCutaneous at bedtime  melatonin 3 milliGRAM(s) Oral at bedtime  metoprolol succinate ER 25 milliGRAM(s) Oral daily  metoprolol tartrate Injectable 5 milliGRAM(s) IV Push once  pantoprazole    Tablet 40 milliGRAM(s) Oral before breakfast  polyethylene glycol 3350 17 Gram(s) Oral at bedtime  sacubitril 24 mG/valsartan 26 mG 1 Tablet(s) Oral two times a day  senna 2 Tablet(s) Oral at bedtime    MEDICATIONS  (PRN):  acetaminophen     Tablet .. 650 milliGRAM(s) Oral every 6 hours PRN Temp greater or equal to 38C (100.4F), Mild Pain (1 - 3)  aluminum hydroxide/magnesium hydroxide/simethicone Suspension 30 milliLiter(s) Oral every 4 hours PRN Dyspepsia  ondansetron Injectable 4 milliGRAM(s) IV Push every 8 hours PRN Nausea and/or Vomiting      FAMILY HISTORY:      SOCIAL HISTORY: No EtOH, no tobacco    REVIEW OF SYSTEMS:    CONSTITUTIONAL: + weakness, fevers or chills, + weight loss   EYES/ENT: No visual changes;  No vertigo or throat pain   RESPIRATORY: No cough, wheezing; +shortness of breath  CARDIOVASCULAR: No chest pain or palpitations  GASTROINTESTINAL: No abdominal or epigastric pain. No nausea, vomiting, or hematemesis; No diarrhea or constipation. No melena or hematochezia.  GENITOURINARY: No dysuria, frequency or hematuria  NEUROLOGICAL: No numbness or weakness  SKIN: No itching, burning, rashes, or lesions   All other review of systems is negative unless indicated above.        T(F): 98.9 (06-27-25 @ 17:11), Max: 98.9 (06-26-25 @ 20:39)  HR: 81 (06-27-25 @ 17:11)  BP: 119/75 (06-27-25 @ 17:11)  RR: 18 (06-27-25 @ 17:11)  SpO2: 98% (06-27-25 @ 17:11)    GENERAL: NAD  HEAD:  Atraumatic, Normocephalic  EYES: EOMI, PERRLA, conjunctiva and sclera clear  CHEST/LUNG: Clear to auscultation  HEART: Regular rate and rhythm  ABDOMEN: Soft, Nontender, Nondistended  EXTREMITIES:  2+ Peripheral Pulses  NEUROLOGY: non-focal  SKIN: No rashes or lesions      140  |  102  |  62[H]  ----------------------------<  106[H]  5.5[H]   |  29  |  1.16    Ca    10.6[H]      27 Jun 2025 06:28        Pleural Fl Pleural Fluid  06-19 @ 14:32   No growth at 5 days  --    polymorphonuclear leukocytes seen by cytocentrifuge  No organisms seen by cytocentrifuge

## 2025-06-27 NOTE — CONSULT NOTE ADULT - CONSULT REASON
L Supraclavicular LN bx
Pleural effusion
Episode of altered mental status
New diagnosis of cancer
Heart Failure

## 2025-06-27 NOTE — PROGRESS NOTE ADULT - PROBLEM SELECTOR PLAN 2
On 6/14/25, TTE with EF 30-35% with wall motion abnormalities. Diuresed with Bumex drip earlier in hospitalization.   - c/w entresto  - f/u HF recs  - s/p cMRI and Nuclear Scan - negative for infiltrative diease  - pt found to have a new malignancy. will now plan to first have a CT coronaries completed to r/o major disease before proceeding with a [facility].

## 2025-06-27 NOTE — CONSULT NOTE ADULT - ASSESSMENT
68 YO F with a history of HTN, bilateral CTS spinal stenosis s/p laminectomy, peripheral neuropathy who presented to [facility] with signs of heart failure. TTE showed LV dysfunction with LVEF 30-35%. Workup for cardiomyopathy included cMRI and nuclear scan, both negative for infiltrative disease. CT chest incidentally revealed left supraclavicular, mediastinal, and hilar lymphadenopathy suspicious for malignancy. She underwent a left supraclavicular lymph node biopsy with path positive for adenocarcinoma, pending immunostains. Oncology is consulted in this context    # Adenocarcinoma  - Newly diagnosed on a left supraclavicular lymph node biopsy. Pending IHC for tissue of origin  - CT chest"  L supraclavicular, mediastinal and hilar lymphadenopathy  Enlarged left supraclavicular, mediastinal and hilar lymph nodes suspicious for neoplasm. The left supraclavicular lymph nodes are amenable to sonographically guided biopsy.    - MRI brain: No acute infarct, acute intracranial hemorrhage, or mass effect.    Recommendations:  - Obtain CT A/P to complete work up  - Await IHC results for tissue of origin.  - Discussed biopsy and pending IHC results, which will help determine diagnosis and next steps  - Please place consult- cancer care direct order in sunrise to coordinate outpatient follow up  68 YO F with a history of HTN, bilateral CTS spinal stenosis s/p laminectomy, peripheral neuropathy who presented to [facility] with signs of heart failure. TTE showed LV dysfunction with LVEF 30-35%. Workup for cardiomyopathy included cMRI and nuclear scan, both negative for infiltrative disease. CT chest incidentally revealed left supraclavicular, mediastinal, and hilar lymphadenopathy suspicious for malignancy. She underwent a left supraclavicular lymph node biopsy with path positive for adenocarcinoma, pending immunostains. Oncology is consulted in this context.     Patient endorsed 90lb weight loss in the past year as well as progressive weakness, SOB with exertion and loss of appetite in the past few months. She was functionally independent but notes that due to her SOB, it was becoming difficulty to care for herself, prompting her to go to the hospital. She reports that she had a mammogram done 6 months ago, reported noted a lesion and recommended for repeat mammogram in 6 months. She had colonoscopy 6 years ago, reported noted a polyp but no colonoscopy since. She has never seen gyn or had a pap smear done. She denied smoking or alcohol use.     # Adenocarcinoma  - Newly diagnosed on a left supraclavicular lymph node biopsy. Pending IHC for tissue of origin  - CT chest  noted Enlarged left supraclavicular, mediastinal and hilar lymph nodes suspicious for neoplasm. The left supraclavicular lymph nodes (x.6cm) are amenable to sonographically guided biopsy.    - MRI brain: No acute infarct, acute intracranial hemorrhage, or mass effect.    Recommendations:  - Obtain CT A/P to complete work up (would prefer to avoid contrast given her renal function)   - Await IHC results for tissue of origin  - Discussed biopsy and that pending IHC results, which will help determine diagnosis and next steps  - She will need PET/CT as outpatient for complete staging   - Please place consult- cancer care direct order in sunrise to coordinate outpatient follow up     Conrad Delgado MD MS  Hematology/Oncology Fellow PGY-4  [employer] at [facility] | [facility] | [facility]  Available on Teams

## 2025-06-28 LAB
ANION GAP SERPL CALC-SCNC: 13 MMOL/L — SIGNIFICANT CHANGE UP (ref 5–17)
BUN SERPL-MCNC: 45 MG/DL — HIGH (ref 7–23)
CALCIUM SERPL-MCNC: 10.3 MG/DL — SIGNIFICANT CHANGE UP (ref 8.4–10.5)
CHLORIDE SERPL-SCNC: 99 MMOL/L — SIGNIFICANT CHANGE UP (ref 96–108)
CO2 SERPL-SCNC: 26 MMOL/L — SIGNIFICANT CHANGE UP (ref 22–31)
CREAT SERPL-MCNC: 1.09 MG/DL — SIGNIFICANT CHANGE UP (ref 0.5–1.3)
EGFR: 55 ML/MIN/1.73M2 — LOW
EGFR: 55 ML/MIN/1.73M2 — LOW
GLUCOSE BLDC GLUCOMTR-MCNC: 107 MG/DL — HIGH (ref 70–99)
GLUCOSE BLDC GLUCOMTR-MCNC: 128 MG/DL — HIGH (ref 70–99)
GLUCOSE BLDC GLUCOMTR-MCNC: 90 MG/DL — SIGNIFICANT CHANGE UP (ref 70–99)
GLUCOSE BLDC GLUCOMTR-MCNC: 91 MG/DL — SIGNIFICANT CHANGE UP (ref 70–99)
GLUCOSE SERPL-MCNC: 98 MG/DL — SIGNIFICANT CHANGE UP (ref 70–99)
POTASSIUM SERPL-MCNC: 4.6 MMOL/L — SIGNIFICANT CHANGE UP (ref 3.5–5.3)
POTASSIUM SERPL-SCNC: 4.6 MMOL/L — SIGNIFICANT CHANGE UP (ref 3.5–5.3)
SODIUM SERPL-SCNC: 138 MMOL/L — SIGNIFICANT CHANGE UP (ref 135–145)

## 2025-06-28 PROCEDURE — 99232 SBSQ HOSP IP/OBS MODERATE 35: CPT

## 2025-06-28 RX ADMIN — Medication 500 MILLIGRAM(S): at 05:37

## 2025-06-28 RX ADMIN — Medication 500 MILLIGRAM(S): at 17:34

## 2025-06-28 RX ADMIN — FUROSEMIDE 40 MILLIGRAM(S): 10 INJECTION INTRAMUSCULAR; INTRAVENOUS at 05:37

## 2025-06-28 RX ADMIN — Medication 3 MILLIGRAM(S): at 21:56

## 2025-06-28 RX ADMIN — ATORVASTATIN CALCIUM 80 MILLIGRAM(S): 80 TABLET, FILM COATED ORAL at 21:55

## 2025-06-28 RX ADMIN — Medication 40 MILLIGRAM(S): at 05:38

## 2025-06-28 RX ADMIN — METOPROLOL SUCCINATE 25 MILLIGRAM(S): 50 TABLET, EXTENDED RELEASE ORAL at 05:37

## 2025-06-28 RX ADMIN — SACUBITRIL AND VALSARTAN 1 TABLET(S): 6; 6 PELLET ORAL at 05:37

## 2025-06-28 RX ADMIN — SACUBITRIL AND VALSARTAN 1 TABLET(S): 6; 6 PELLET ORAL at 17:34

## 2025-06-28 RX ADMIN — Medication 81 MILLIGRAM(S): at 11:33

## 2025-06-28 NOTE — PROGRESS NOTE ADULT - PROBLEM SELECTOR PLAN 1
resolved. patient now on room air  AMS improved with BIPAP so it was continued.   Supposed to use CPAP at home, but does not use. VBG on 6/15/25 while sleeping shows carbon dioxide retention, likely caused obtunded status.   - BIPAP nocturnal while inpatient then CPAP at home  - resumed on PO lasix 40mg daily with improvement

## 2025-06-28 NOTE — PROGRESS NOTE ADULT - PROBLEM SELECTOR PLAN 4
CT chest with L supraclavicular, mediastinal and hilar lymphadenopathy  - d/w pt and family re concern for cancer, wish to proceed with further eval.   - s/p L supraclavicular bx. --> path showing adenocarcinoma, pending stains --> oncology recommends outpatient CT/PET scan and cancer navigation

## 2025-06-28 NOTE — PROGRESS NOTE ADULT - TIME BILLING
patient encounter, reviewing tests and imaging, independently obtaining a history, performing a physical examination,  ordering medications/tests, documenting clinical information, and coordinating care. This excludes any time spent on teaching.

## 2025-06-28 NOTE — PROGRESS NOTE ADULT - PROBLEM SELECTOR PLAN 2
On 6/14/25, TTE with EF 30-35% with wall motion abnormalities. Diuresed with Bumex drip earlier in hospitalization.   - c/w entresto  - f/u HF recs  - s/p cMRI and Nuclear Scan - negative for infiltrative diease  - pt found to have a new malignancy. will now plan to first have a CT coronaries completed to r/o major disease before proceeding with a [facility].

## 2025-06-28 NOTE — PROGRESS NOTE ADULT - SUBJECTIVE AND OBJECTIVE BOX
Gabby Estrada MD  Division of Hospital Medicine  Please contact via MS Teams (prefer message first)  Office: [phone]    Patient is a 69y old  Female who presents with a chief complaint of Acute decompensated heart failure (27 Jun 2025 14:45)      SUBJECTIVE / OVERNIGHT EVENTS:  no acute events overnight, vss, afebrile  pt feels okay- eating some breakfast    ROS:  14 point ROS negative in detail except stated as above    MEDICATIONS  (STANDING):  aspirin  chewable 81 milliGRAM(s) Oral daily  atorvastatin 80 milliGRAM(s) Oral at bedtime  divalproex  milliGRAM(s) Oral two times a day  furosemide    Tablet 40 milliGRAM(s) Oral daily  insulin lispro (ADMELOG) corrective regimen sliding scale   SubCutaneous three times a day before meals  insulin lispro (ADMELOG) corrective regimen sliding scale   SubCutaneous at bedtime  melatonin 3 milliGRAM(s) Oral at bedtime  metoprolol succinate ER 25 milliGRAM(s) Oral daily  metoprolol tartrate Injectable 5 milliGRAM(s) IV Push once  pantoprazole    Tablet 40 milliGRAM(s) Oral before breakfast  polyethylene glycol 3350 17 Gram(s) Oral at bedtime  sacubitril 24 mG/valsartan 26 mG 1 Tablet(s) Oral two times a day  senna 2 Tablet(s) Oral at bedtime    MEDICATIONS  (PRN):  acetaminophen     Tablet .. 650 milliGRAM(s) Oral every 6 hours PRN Temp greater or equal to 38C (100.4F), Mild Pain (1 - 3)  aluminum hydroxide/magnesium hydroxide/simethicone Suspension 30 milliLiter(s) Oral every 4 hours PRN Dyspepsia  ondansetron Injectable 4 milliGRAM(s) IV Push every 8 hours PRN Nausea and/or Vomiting      CAPILLARY BLOOD GLUCOSE      POCT Blood Glucose.: 107 mg/dL (28 Jun 2025 11:22)  POCT Blood Glucose.: 91 mg/dL (28 Jun 2025 07:16)  POCT Blood Glucose.: 133 mg/dL (27 Jun 2025 21:16)  POCT Blood Glucose.: 119 mg/dL (27 Jun 2025 17:23)    I&O's Summary    27 Jun 2025 07:01  -  28 Jun 2025 07:00  --------------------------------------------------------  IN: 500 mL / OUT: 1975 mL / NET: -1475 mL        PHYSICAL EXAM:  Vital Signs Last 24 Hrs  T(C): 37.1 (28 Jun 2025 10:49), Max: 37.2 (27 Jun 2025 17:11)  T(F): 98.7 (28 Jun 2025 10:49), Max: 98.9 (27 Jun 2025 17:11)  HR: 61 (28 Jun 2025 10:49) (61 - 84)  BP: 107/64 (28 Jun 2025 10:49) (107/64 - 128/76)  BP(mean): --  RR: 18 (28 Jun 2025 10:49) (18 - 18)  SpO2: 95% (28 Jun 2025 10:49) (95% - 100%)    Parameters below as of 28 Jun 2025 10:49  Patient On (Oxygen Delivery Method): nasal cannula  O2 Flow (L/min): 1    GENERAL: NAD, well-developed  HEAD:  Atraumatic, Normocephalic  EYES: EOMI, PERRLA, conjunctiva and sclera clear  NECK: Supple, No JVD  CHEST/LUNG: Clear to auscultation bilaterally; No wheeze  HEART: Regular rate and rhythm;   ABDOMEN: Soft, Nontender, Nondistended; Bowel sounds present  EXTREMITIES:  2+ Peripheral Pulses, No clubbing, cyanosis, or edema  NEUROLOGY: AAOx3; non-focal  SKIN: No rashes or lesions    LABS:    06-27    140  |  102  |  62[H]  ----------------------------<  106[H]  5.5[H]   |  29  |  1.16    Ca    10.6[H]      27 Jun 2025 06:28            Urinalysis Basic - ( 27 Jun 2025 06:28 )    Color: x / Appearance: x / SG: x / pH: x  Gluc: 106 mg/dL / Ketone: x  / Bili: x / Urobili: x   Blood: x / Protein: x / Nitrite: x   Leuk Esterase: x / RBC: x / WBC x   Sq Epi: x / Non Sq Epi: x / Bacteria: x        RADIOLOGY & ADDITIONAL TESTS:    Imaging Personally Reviewed:    Consultant(s) Notes Reviewed:  EP, heme/onc    Care Discussed with Consultants/Other Providers:

## 2025-06-29 LAB
ANION GAP SERPL CALC-SCNC: 11 MMOL/L — SIGNIFICANT CHANGE UP (ref 5–17)
BUN SERPL-MCNC: 37 MG/DL — HIGH (ref 7–23)
CALCIUM SERPL-MCNC: 9.8 MG/DL — SIGNIFICANT CHANGE UP (ref 8.4–10.5)
CHLORIDE SERPL-SCNC: 100 MMOL/L — SIGNIFICANT CHANGE UP (ref 96–108)
CO2 SERPL-SCNC: 27 MMOL/L — SIGNIFICANT CHANGE UP (ref 22–31)
CREAT SERPL-MCNC: 1.09 MG/DL — SIGNIFICANT CHANGE UP (ref 0.5–1.3)
EGFR: 55 ML/MIN/1.73M2 — LOW
EGFR: 55 ML/MIN/1.73M2 — LOW
GLUCOSE BLDC GLUCOMTR-MCNC: 107 MG/DL — HIGH (ref 70–99)
GLUCOSE BLDC GLUCOMTR-MCNC: 150 MG/DL — HIGH (ref 70–99)
GLUCOSE BLDC GLUCOMTR-MCNC: 201 MG/DL — HIGH (ref 70–99)
GLUCOSE BLDC GLUCOMTR-MCNC: 96 MG/DL — SIGNIFICANT CHANGE UP (ref 70–99)
GLUCOSE SERPL-MCNC: 110 MG/DL — HIGH (ref 70–99)
HCT VFR BLD CALC: 29.6 % — LOW (ref 34.5–45)
HGB BLD-MCNC: 9.1 G/DL — LOW (ref 11.5–15.5)
MCHC RBC-ENTMCNC: 27.2 PG — SIGNIFICANT CHANGE UP (ref 27–34)
MCHC RBC-ENTMCNC: 30.7 G/DL — LOW (ref 32–36)
MCV RBC AUTO: 88.6 FL — SIGNIFICANT CHANGE UP (ref 80–100)
NRBC # BLD AUTO: 0 K/UL — SIGNIFICANT CHANGE UP (ref 0–0)
NRBC # FLD: 0 K/UL — SIGNIFICANT CHANGE UP (ref 0–0)
NRBC BLD AUTO-RTO: 0 /100 WBCS — SIGNIFICANT CHANGE UP (ref 0–0)
PLATELET # BLD AUTO: 356 K/UL — SIGNIFICANT CHANGE UP (ref 150–400)
PMV BLD: 10.8 FL — SIGNIFICANT CHANGE UP (ref 7–13)
POTASSIUM SERPL-MCNC: 4.6 MMOL/L — SIGNIFICANT CHANGE UP (ref 3.5–5.3)
POTASSIUM SERPL-SCNC: 4.6 MMOL/L — SIGNIFICANT CHANGE UP (ref 3.5–5.3)
RBC # BLD: 3.34 M/UL — LOW (ref 3.8–5.2)
RBC # FLD: 16 % — HIGH (ref 10.3–14.5)
SODIUM SERPL-SCNC: 138 MMOL/L — SIGNIFICANT CHANGE UP (ref 135–145)
WBC # BLD: 5.76 K/UL — SIGNIFICANT CHANGE UP (ref 3.8–10.5)
WBC # FLD AUTO: 5.76 K/UL — SIGNIFICANT CHANGE UP (ref 3.8–10.5)

## 2025-06-29 PROCEDURE — 74176 CT ABD & PELVIS W/O CONTRAST: CPT | Mod: 26

## 2025-06-29 PROCEDURE — 99232 SBSQ HOSP IP/OBS MODERATE 35: CPT

## 2025-06-29 RX ADMIN — Medication 500 MILLIGRAM(S): at 17:04

## 2025-06-29 RX ADMIN — ATORVASTATIN CALCIUM 80 MILLIGRAM(S): 80 TABLET, FILM COATED ORAL at 21:27

## 2025-06-29 RX ADMIN — SACUBITRIL AND VALSARTAN 1 TABLET(S): 6; 6 PELLET ORAL at 17:03

## 2025-06-29 RX ADMIN — Medication 40 MILLIGRAM(S): at 05:53

## 2025-06-29 RX ADMIN — Medication 81 MILLIGRAM(S): at 11:12

## 2025-06-29 RX ADMIN — Medication 3 MILLIGRAM(S): at 21:27

## 2025-06-29 RX ADMIN — Medication 500 MILLIGRAM(S): at 05:53

## 2025-06-29 RX ADMIN — FUROSEMIDE 40 MILLIGRAM(S): 10 INJECTION INTRAMUSCULAR; INTRAVENOUS at 05:53

## 2025-06-29 RX ADMIN — METOPROLOL SUCCINATE 25 MILLIGRAM(S): 50 TABLET, EXTENDED RELEASE ORAL at 05:53

## 2025-06-29 RX ADMIN — SACUBITRIL AND VALSARTAN 1 TABLET(S): 6; 6 PELLET ORAL at 05:53

## 2025-06-29 NOTE — PROGRESS NOTE ADULT - SUBJECTIVE AND OBJECTIVE BOX
INTERVAL HPI/OVERNIGHT EVENTS: patient remains stable on current medical regimen    Awaiting CTA coronary arteries    MEDICATIONS  (STANDING):  aspirin  chewable 81 milliGRAM(s) Oral daily  atorvastatin 80 milliGRAM(s) Oral at bedtime  divalproex  milliGRAM(s) Oral two times a day  furosemide    Tablet 40 milliGRAM(s) Oral daily  insulin lispro (ADMELOG) corrective regimen sliding scale   SubCutaneous three times a day before meals  insulin lispro (ADMELOG) corrective regimen sliding scale   SubCutaneous at bedtime  melatonin 3 milliGRAM(s) Oral at bedtime  metoprolol succinate ER 25 milliGRAM(s) Oral daily  metoprolol tartrate Injectable 5 milliGRAM(s) IV Push once  pantoprazole    Tablet 40 milliGRAM(s) Oral before breakfast  polyethylene glycol 3350 17 Gram(s) Oral at bedtime  sacubitril 24 mG/valsartan 26 mG 1 Tablet(s) Oral two times a day  senna 2 Tablet(s) Oral at bedtime    MEDICATIONS  (PRN):  acetaminophen     Tablet .. 650 milliGRAM(s) Oral every 6 hours PRN Temp greater or equal to 38C (100.4F), Mild Pain (1 - 3)  aluminum hydroxide/magnesium hydroxide/simethicone Suspension 30 milliLiter(s) Oral every 4 hours PRN Dyspepsia  ondansetron Injectable 4 milliGRAM(s) IV Push every 8 hours PRN Nausea and/or Vomiting      Allergies    shellfish (Other; Short breath)  No Known Drug Allergies    Intolerances    lactose (Unknown)    ROS:  General: Pt denies recent weight loss/fever/chills    Neurological: denies numbness or  sensation loss    Cardiovascular: denies chest pain/palpitations/leg edema    Respiratory and Thorax: denies SOB/cough/wheezing    Gastrointestinal: denies abdominal pain/diarrhea/constipation/bloody stool    Genitourinary: denies urinary frequency/urgency/ dysuria    Musculoskeletal: denies joint pain or swelling, denies restricted motion    Hematologic: denies abnormal bleeding  	    	  	    		        	    	            Vital Signs Last 24 Hrs  T(C): 37 (2025 10:53), Max: 37.2 (2025 19:32)  T(F): 98.6 (2025 10:53), Max: 99 (2025 19:32)  HR: 75 (2025 10:53) (65 - 86)  BP: 100/59 (2025 10:53) (100/59 - 129/67)  BP(mean): --  RR: 18 (2025 10:53) (17 - 18)  SpO2: 94% (2025 10:53) (94% - 99%)    Parameters below as of 2025 10:53  Patient On (Oxygen Delivery Method): nasal cannula  O2 Flow (L/min): 1    Daily     Daily Weight in k (2025 07:03)     @ 07:01  -   @ 07:00  --------------------------------------------------------  IN: 1300 mL / OUT: 950 mL / NET: 350 mL     @ 07:  -   @ 15:42  --------------------------------------------------------  IN: 0 mL / OUT: 800 mL / NET: -800 mL      Physical Exam:    wdwn female  no JVD   cor RRR  lung clear   aBD SOFT   ext no edema      LABS:                        9.1    5.76  )-----------( 356      ( 2025 12:12 )             29.6     06-    138  |  100  |  37[H]  ----------------------------<  110[H]  4.6   |  27  |  1.09    Ca    9.8      2025 12:12        Urinalysis Basic - ( 2025 12:12 )    Color: x / Appearance: x / SG: x / pH: x  Gluc: 110 mg/dL / Ketone: x  / Bili: x / Urobili: x   Blood: x / Protein: x / Nitrite: x   Leuk Esterase: x / RBC: x / WBC x   Sq Epi: x / Non Sq Epi: x / Bacteria: x        RADIOLOGY & ADDITIONAL TESTS:    TELE:    EKG:

## 2025-06-29 NOTE — PROGRESS NOTE ADULT - PROBLEM SELECTOR PLAN 2
On 6/14/25, TTE with EF 30-35% with wall motion abnormalities. Diuresed with Bumex drip earlier in hospitalization.   - c/w entresto  - f/u HF recs  - s/p cMRI and Nuclear Scan - negative for infiltrative diease  - pt found to have a new malignancy. will now plan to first have a CT coronaries completed to r/o major disease before proceeding with a [facility].  - pending CTA coronaries

## 2025-06-29 NOTE — PROGRESS NOTE ADULT - ASSESSMENT
69F with PMH of HTN, DM2, VERONIKA, sciatica, peripheral neuropathy, hx of seizure initially presented to [facility] on 6/10/25 with progressively worsening SOB, admitted for ADHF and possible pneumonia. EKG LBBB  Received IV diuretics (lasix) and antibiotics for pneumonia and was transferred to CCU. Ultimately transferred to [facility] CICU for further management. On arrival, she was on BIPAP & started on IV diuresis for respiratory distress & significant pulmonary edema. She has since been downgraded to telemetry & is on 2L NC. She has responded well to diuresis and GDMT. S/p thoracentesis 6/19    SHe is now s/p cMR and NM amyloid scan which are negative for infiltrative disease . however biopsy is + for adenocarcinoma    Was sob, improved with furosemide       advise    1continue current CHF  regimen   2. CHF service feels A CTA coronary would be sufficient to exclude obstructive CAD, please arrange, If unable to arrange in timely manner, or HR prevents adequate study, will pursue C this be adequate in light of resting HR 80s  3/ continue remainder meds  4. in light of malignancy w /u hold on CRT D at this time

## 2025-06-29 NOTE — PROGRESS NOTE ADULT - PROBLEM SELECTOR PLAN 1
resolved. patient now on room air  AMS improved with BIPAP so it was continued.   Supposed to use CPAP at home, but does not use. VBG on 6/15/25 while sleeping shows carbon dioxide retention, likely caused obtunded status.   - BIPAP nocturnal while inpatient then CPAP at home  - resumed on PO lasix 40mg daily with improvement in respiratory status. Continue

## 2025-06-29 NOTE — PROGRESS NOTE ADULT - SUBJECTIVE AND OBJECTIVE BOX
Gabby Estrada MD  Division of Hospital Medicine  Please contact via MS Teams (prefer message first)  Office: [phone]    Patient is a 69y old  Female who presents with a chief complaint of Acute decompensated heart failure (28 Jun 2025 11:30)      SUBJECTIVE / OVERNIGHT EVENTS:  no acute events overnight, vss, afebrile  pt feels alright  still with poor appetite    ROS:  14 point ROS negative in detail except stated as above    MEDICATIONS  (STANDING):  aspirin  chewable 81 milliGRAM(s) Oral daily  atorvastatin 80 milliGRAM(s) Oral at bedtime  divalproex  milliGRAM(s) Oral two times a day  furosemide    Tablet 40 milliGRAM(s) Oral daily  insulin lispro (ADMELOG) corrective regimen sliding scale   SubCutaneous three times a day before meals  insulin lispro (ADMELOG) corrective regimen sliding scale   SubCutaneous at bedtime  melatonin 3 milliGRAM(s) Oral at bedtime  metoprolol succinate ER 25 milliGRAM(s) Oral daily  metoprolol tartrate Injectable 5 milliGRAM(s) IV Push once  pantoprazole    Tablet 40 milliGRAM(s) Oral before breakfast  polyethylene glycol 3350 17 Gram(s) Oral at bedtime  sacubitril 24 mG/valsartan 26 mG 1 Tablet(s) Oral two times a day  senna 2 Tablet(s) Oral at bedtime    MEDICATIONS  (PRN):  acetaminophen     Tablet .. 650 milliGRAM(s) Oral every 6 hours PRN Temp greater or equal to 38C (100.4F), Mild Pain (1 - 3)  aluminum hydroxide/magnesium hydroxide/simethicone Suspension 30 milliLiter(s) Oral every 4 hours PRN Dyspepsia  ondansetron Injectable 4 milliGRAM(s) IV Push every 8 hours PRN Nausea and/or Vomiting      CAPILLARY BLOOD GLUCOSE      POCT Blood Glucose.: 107 mg/dL (29 Jun 2025 11:20)  POCT Blood Glucose.: 96 mg/dL (29 Jun 2025 07:20)  POCT Blood Glucose.: 90 mg/dL (28 Jun 2025 21:12)  POCT Blood Glucose.: 128 mg/dL (28 Jun 2025 17:02)    I&O's Summary    28 Jun 2025 07:01  -  29 Jun 2025 07:00  --------------------------------------------------------  IN: 1300 mL / OUT: 950 mL / NET: 350 mL        PHYSICAL EXAM:  Vital Signs Last 24 Hrs  T(C): 37 (29 Jun 2025 10:53), Max: 37.2 (28 Jun 2025 19:32)  T(F): 98.6 (29 Jun 2025 10:53), Max: 99 (28 Jun 2025 19:32)  HR: 75 (29 Jun 2025 10:53) (65 - 86)  BP: 100/59 (29 Jun 2025 10:53) (100/59 - 129/67)  BP(mean): --  RR: 18 (29 Jun 2025 10:53) (17 - 18)  SpO2: 94% (29 Jun 2025 10:53) (94% - 100%)    Parameters below as of 29 Jun 2025 10:53  Patient On (Oxygen Delivery Method): nasal cannula  O2 Flow (L/min): 1    GENERAL: NAD, well-developed  HEAD:  Atraumatic, Normocephalic  EYES: EOMI, PERRLA, conjunctiva and sclera clear  NECK: Supple, No JVD  CHEST/LUNG: Clear to auscultation bilaterally; No wheeze  HEART: Regular rate and rhythm; No murmurs, rubs, or gallops  ABDOMEN: Soft, Nontender, Nondistended; Bowel sounds present  EXTREMITIES:  2+ Peripheral Pulses, No clubbing, cyanosis, or edema  NEUROLOGY: AAOx3; non-focal  SKIN: No rashes or lesions    LABS:    06-28    138  |  99  |  45[H]  ----------------------------<  98  4.6   |  26  |  1.09    Ca    10.3      28 Jun 2025 11:03            Urinalysis Basic - ( 28 Jun 2025 11:03 )    Color: x / Appearance: x / SG: x / pH: x  Gluc: 98 mg/dL / Ketone: x  / Bili: x / Urobili: x   Blood: x / Protein: x / Nitrite: x   Leuk Esterase: x / RBC: x / WBC x   Sq Epi: x / Non Sq Epi: x / Bacteria: x        RADIOLOGY & ADDITIONAL TESTS:    Imaging Personally Reviewed:    Consultant(s) Notes Reviewed:      Care Discussed with Consultants/Other Providers:

## 2025-06-30 LAB
GLUCOSE BLDC GLUCOMTR-MCNC: 116 MG/DL — HIGH (ref 70–99)
GLUCOSE BLDC GLUCOMTR-MCNC: 124 MG/DL — HIGH (ref 70–99)
GLUCOSE BLDC GLUCOMTR-MCNC: 149 MG/DL — HIGH (ref 70–99)
GLUCOSE BLDC GLUCOMTR-MCNC: 291 MG/DL — HIGH (ref 70–99)

## 2025-06-30 PROCEDURE — 0241U: CPT

## 2025-06-30 PROCEDURE — 82140 ASSAY OF AMMONIA: CPT

## 2025-06-30 PROCEDURE — 80164 ASSAY DIPROPYLACETIC ACD TOT: CPT

## 2025-06-30 PROCEDURE — 88112 CYTOPATH CELL ENHANCE TECH: CPT

## 2025-06-30 PROCEDURE — 83986 ASSAY PH BODY FLUID NOS: CPT

## 2025-06-30 PROCEDURE — 85027 COMPLETE CBC AUTOMATED: CPT

## 2025-06-30 PROCEDURE — 83605 ASSAY OF LACTIC ACID: CPT

## 2025-06-30 PROCEDURE — 85014 HEMATOCRIT: CPT

## 2025-06-30 PROCEDURE — 89051 BODY FLUID CELL COUNT: CPT

## 2025-06-30 PROCEDURE — 70450 CT HEAD/BRAIN W/O DYE: CPT

## 2025-06-30 PROCEDURE — 87102 FUNGUS ISOLATION CULTURE: CPT

## 2025-06-30 PROCEDURE — 71250 CT THORAX DX C-: CPT

## 2025-06-30 PROCEDURE — 85610 PROTHROMBIN TIME: CPT

## 2025-06-30 PROCEDURE — 85730 THROMBOPLASTIN TIME PARTIAL: CPT

## 2025-06-30 PROCEDURE — 86850 RBC ANTIBODY SCREEN: CPT

## 2025-06-30 PROCEDURE — 71046 X-RAY EXAM CHEST 2 VIEWS: CPT

## 2025-06-30 PROCEDURE — 88185 FLOWCYTOMETRY/TC ADD-ON: CPT

## 2025-06-30 PROCEDURE — 97530 THERAPEUTIC ACTIVITIES: CPT

## 2025-06-30 PROCEDURE — 86335 IMMUNFIX E-PHORSIS/URINE/CSF: CPT

## 2025-06-30 PROCEDURE — 84295 ASSAY OF SERUM SODIUM: CPT

## 2025-06-30 PROCEDURE — 93005 ELECTROCARDIOGRAM TRACING: CPT

## 2025-06-30 PROCEDURE — 87075 CULTR BACTERIA EXCEPT BLOOD: CPT

## 2025-06-30 PROCEDURE — 88341 IMHCHEM/IMCYTCHM EA ADD ANTB: CPT

## 2025-06-30 PROCEDURE — 38505 NEEDLE BIOPSY LYMPH NODES: CPT

## 2025-06-30 PROCEDURE — 99233 SBSQ HOSP IP/OBS HIGH 50: CPT

## 2025-06-30 PROCEDURE — 84155 ASSAY OF PROTEIN SERUM: CPT

## 2025-06-30 PROCEDURE — 86334 IMMUNOFIX E-PHORESIS SERUM: CPT

## 2025-06-30 PROCEDURE — 84145 PROCALCITONIN (PCT): CPT

## 2025-06-30 PROCEDURE — 97161 PT EVAL LOW COMPLEX 20 MIN: CPT

## 2025-06-30 PROCEDURE — 82803 BLOOD GASES ANY COMBINATION: CPT

## 2025-06-30 PROCEDURE — 88313 SPECIAL STAINS GROUP 2: CPT

## 2025-06-30 PROCEDURE — 88173 CYTOPATH EVAL FNA REPORT: CPT

## 2025-06-30 PROCEDURE — 76942 ECHO GUIDE FOR BIOPSY: CPT

## 2025-06-30 PROCEDURE — 70551 MRI BRAIN STEM W/O DYE: CPT

## 2025-06-30 PROCEDURE — 94640 AIRWAY INHALATION TREATMENT: CPT

## 2025-06-30 PROCEDURE — 97112 NEUROMUSCULAR REEDUCATION: CPT

## 2025-06-30 PROCEDURE — 88305 TISSUE EXAM BY PATHOLOGIST: CPT

## 2025-06-30 PROCEDURE — 87070 CULTURE OTHR SPECIMN AEROBIC: CPT

## 2025-06-30 PROCEDURE — 71045 X-RAY EXAM CHEST 1 VIEW: CPT

## 2025-06-30 PROCEDURE — 82947 ASSAY GLUCOSE BLOOD QUANT: CPT

## 2025-06-30 PROCEDURE — 36415 COLL VENOUS BLD VENIPUNCTURE: CPT

## 2025-06-30 PROCEDURE — A9585: CPT

## 2025-06-30 PROCEDURE — 82042 OTHER SOURCE ALBUMIN QUAN EA: CPT

## 2025-06-30 PROCEDURE — 84166 PROTEIN E-PHORESIS/URINE/CSF: CPT

## 2025-06-30 PROCEDURE — 94660 CPAP INITIATION&MGMT: CPT

## 2025-06-30 PROCEDURE — 87015 SPECIMEN INFECT AGNT CONCNTJ: CPT

## 2025-06-30 PROCEDURE — 80048 BASIC METABOLIC PNL TOTAL CA: CPT

## 2025-06-30 PROCEDURE — 82962 GLUCOSE BLOOD TEST: CPT

## 2025-06-30 PROCEDURE — 83036 HEMOGLOBIN GLYCOSYLATED A1C: CPT

## 2025-06-30 PROCEDURE — 85018 HEMOGLOBIN: CPT

## 2025-06-30 PROCEDURE — 86900 BLOOD TYPING SEROLOGIC ABO: CPT

## 2025-06-30 PROCEDURE — 82306 VITAMIN D 25 HYDROXY: CPT

## 2025-06-30 PROCEDURE — A9561: CPT

## 2025-06-30 PROCEDURE — 80053 COMPREHEN METABOLIC PANEL: CPT

## 2025-06-30 PROCEDURE — 82945 GLUCOSE OTHER FLUID: CPT

## 2025-06-30 PROCEDURE — 83735 ASSAY OF MAGNESIUM: CPT

## 2025-06-30 PROCEDURE — 84165 PROTEIN E-PHORESIS SERUM: CPT

## 2025-06-30 PROCEDURE — 84157 ASSAY OF PROTEIN OTHER: CPT

## 2025-06-30 PROCEDURE — 88342 IMHCHEM/IMCYTCHM 1ST ANTB: CPT

## 2025-06-30 PROCEDURE — 82435 ASSAY OF BLOOD CHLORIDE: CPT

## 2025-06-30 PROCEDURE — 95816 EEG AWAKE AND DROWSY: CPT

## 2025-06-30 PROCEDURE — 80061 LIPID PANEL: CPT

## 2025-06-30 PROCEDURE — 93306 TTE W/DOPPLER COMPLETE: CPT

## 2025-06-30 PROCEDURE — 88333 PATH CONSLTJ SURG CYTO XM 1: CPT

## 2025-06-30 PROCEDURE — 84443 ASSAY THYROID STIM HORMONE: CPT

## 2025-06-30 PROCEDURE — 86901 BLOOD TYPING SEROLOGIC RH(D): CPT

## 2025-06-30 PROCEDURE — 78830 RP LOCLZJ TUM SPECT W/CT 1: CPT

## 2025-06-30 PROCEDURE — 88184 FLOWCYTOMETRY/ TC 1 MARKER: CPT

## 2025-06-30 PROCEDURE — 83521 IG LIGHT CHAINS FREE EACH: CPT

## 2025-06-30 PROCEDURE — 83880 ASSAY OF NATRIURETIC PEPTIDE: CPT

## 2025-06-30 PROCEDURE — 85025 COMPLETE CBC W/AUTO DIFF WBC: CPT

## 2025-06-30 PROCEDURE — 75561 CARDIAC MRI FOR MORPH W/DYE: CPT

## 2025-06-30 PROCEDURE — 84156 ASSAY OF PROTEIN URINE: CPT

## 2025-06-30 PROCEDURE — 88360 TUMOR IMMUNOHISTOCHEM/MANUAL: CPT

## 2025-06-30 PROCEDURE — 74176 CT ABD & PELVIS W/O CONTRAST: CPT

## 2025-06-30 PROCEDURE — 36600 WITHDRAWAL OF ARTERIAL BLOOD: CPT

## 2025-06-30 PROCEDURE — 84132 ASSAY OF SERUM POTASSIUM: CPT

## 2025-06-30 PROCEDURE — 85520 HEPARIN ASSAY: CPT

## 2025-06-30 PROCEDURE — 87205 SMEAR GRAM STAIN: CPT

## 2025-06-30 PROCEDURE — A9538: CPT

## 2025-06-30 PROCEDURE — C8924: CPT

## 2025-06-30 PROCEDURE — 82330 ASSAY OF CALCIUM: CPT

## 2025-06-30 PROCEDURE — 84100 ASSAY OF PHOSPHORUS: CPT

## 2025-06-30 PROCEDURE — 97110 THERAPEUTIC EXERCISES: CPT

## 2025-06-30 PROCEDURE — 83615 LACTATE (LD) (LDH) ENZYME: CPT

## 2025-06-30 RX ADMIN — Medication 2 TABLET(S): at 22:25

## 2025-06-30 RX ADMIN — SACUBITRIL AND VALSARTAN 1 TABLET(S): 6; 6 PELLET ORAL at 05:50

## 2025-06-30 RX ADMIN — Medication 3 MILLIGRAM(S): at 22:25

## 2025-06-30 RX ADMIN — FUROSEMIDE 40 MILLIGRAM(S): 10 INJECTION INTRAMUSCULAR; INTRAVENOUS at 05:51

## 2025-06-30 RX ADMIN — Medication 500 MILLIGRAM(S): at 05:51

## 2025-06-30 RX ADMIN — SACUBITRIL AND VALSARTAN 1 TABLET(S): 6; 6 PELLET ORAL at 18:21

## 2025-06-30 RX ADMIN — INSULIN LISPRO 3: 100 INJECTION, SOLUTION INTRAVENOUS; SUBCUTANEOUS at 17:37

## 2025-06-30 RX ADMIN — Medication 500 MILLIGRAM(S): at 17:36

## 2025-06-30 RX ADMIN — Medication 40 MILLIGRAM(S): at 05:50

## 2025-06-30 RX ADMIN — ATORVASTATIN CALCIUM 80 MILLIGRAM(S): 80 TABLET, FILM COATED ORAL at 22:25

## 2025-06-30 RX ADMIN — Medication 81 MILLIGRAM(S): at 11:17

## 2025-06-30 RX ADMIN — METOPROLOL SUCCINATE 25 MILLIGRAM(S): 50 TABLET, EXTENDED RELEASE ORAL at 05:51

## 2025-06-30 NOTE — PROGRESS NOTE ADULT - SUBJECTIVE AND OBJECTIVE BOX
Andre Reyes, M.D.  Office: [phone]  Available thru [employer] Teams     Patient is a 69y old  Female who presents with a chief complaint of Acute decompensated heart failure (29 Jun 2025 15:42)          SUBJECTIVE / OVERNIGHT EVENTS:    No acute overnight events.        MEDICATIONS  (STANDING):  aspirin  chewable 81 milliGRAM(s) Oral daily  atorvastatin 80 milliGRAM(s) Oral at bedtime  divalproex  milliGRAM(s) Oral two times a day  furosemide    Tablet 40 milliGRAM(s) Oral daily  insulin lispro (ADMELOG) corrective regimen sliding scale   SubCutaneous three times a day before meals  insulin lispro (ADMELOG) corrective regimen sliding scale   SubCutaneous at bedtime  melatonin 3 milliGRAM(s) Oral at bedtime  metoprolol succinate ER 25 milliGRAM(s) Oral daily  metoprolol tartrate Injectable 5 milliGRAM(s) IV Push once  pantoprazole    Tablet 40 milliGRAM(s) Oral before breakfast  polyethylene glycol 3350 17 Gram(s) Oral at bedtime  sacubitril 24 mG/valsartan 26 mG 1 Tablet(s) Oral two times a day  senna 2 Tablet(s) Oral at bedtime    MEDICATIONS  (PRN):  acetaminophen     Tablet .. 650 milliGRAM(s) Oral every 6 hours PRN Temp greater or equal to 38C (100.4F), Mild Pain (1 - 3)  aluminum hydroxide/magnesium hydroxide/simethicone Suspension 30 milliLiter(s) Oral every 4 hours PRN Dyspepsia  ondansetron Injectable 4 milliGRAM(s) IV Push every 8 hours PRN Nausea and/or Vomiting          T(C): 37.1 (06-30 @ 04:00), Max: 37.1 (06-30 @ 04:00)   HR: 85   BP: 118/74   RR: 18   SpO2: 99%    PHYSICAL EXAM:    CONSTITUTIONAL: NAD, well-developed, well-groomed  EYES: PERRLA; conjunctiva and sclera clear  ENMT: Moist oral mucosa, no pharyngeal injection or exudates; normal dentition  RESPIRATORY: Normal respiratory effort; lungs are clear to auscultation bilaterally  CARDIOVASCULAR: Regular rate and rhythm, normal S1 and S2, no murmur/rub/gallop; No lower extremity edema; Peripheral pulses are 2+ bilaterally  ABDOMEN: Nontender to palpation, normoactive bowel sounds, no rebound/guarding; No hepatosplenomegaly  MUSCULOSKELETAL:  no clubbing or cyanosis of digits; no joint swelling or tenderness to palpation  PSYCH: A+O to person, place, and time; affect appropriate  NEUROLOGY: CN 2-12 are intact and symmetric; no gross sensory deficits       LABS:                        9.1    5.76  )-----------( 356      ( 29 Jun 2025 12:12 )             29.6      06-29    138  |  100  |  37[H]  ----------------------------<  110[H]  4.6   |  27  |  1.09    Ca    9.8      29 Jun 2025 12:12         CAPILLARY BLOOD GLUCOSE      POCT Blood Glucose.: 116 mg/dL (30 Jun 2025 07:34)  POCT Blood Glucose.: 201 mg/dL (29 Jun 2025 21:08)  POCT Blood Glucose.: 150 mg/dL (29 Jun 2025 17:39)  POCT Blood Glucose.: 107 mg/dL (29 Jun 2025 11:20)      RADIOLOGY & ADDITIONAL TESTS:    Imaging Personally Reviewed:  Consultant(s) Notes Reviewed:    Care Discussed with Consultants/Other Providers:   Andre Reyes, M.D.  Office: [phone]  Available thru Microsoft Teams     Patient is a 69y old  Female who presents with a chief complaint of Acute decompensated heart failure (29 Jun 2025 15:42)          SUBJECTIVE / OVERNIGHT EVENTS:    No acute overnight events.  feels weak  son at bedside      MEDICATIONS  (STANDING):  aspirin  chewable 81 milliGRAM(s) Oral daily  atorvastatin 80 milliGRAM(s) Oral at bedtime  divalproex  milliGRAM(s) Oral two times a day  furosemide    Tablet 40 milliGRAM(s) Oral daily  insulin lispro (ADMELOG) corrective regimen sliding scale   SubCutaneous three times a day before meals  insulin lispro (ADMELOG) corrective regimen sliding scale   SubCutaneous at bedtime  melatonin 3 milliGRAM(s) Oral at bedtime  metoprolol succinate ER 25 milliGRAM(s) Oral daily  metoprolol tartrate Injectable 5 milliGRAM(s) IV Push once  pantoprazole    Tablet 40 milliGRAM(s) Oral before breakfast  polyethylene glycol 3350 17 Gram(s) Oral at bedtime  sacubitril 24 mG/valsartan 26 mG 1 Tablet(s) Oral two times a day  senna 2 Tablet(s) Oral at bedtime    MEDICATIONS  (PRN):  acetaminophen     Tablet .. 650 milliGRAM(s) Oral every 6 hours PRN Temp greater or equal to 38C (100.4F), Mild Pain (1 - 3)  aluminum hydroxide/magnesium hydroxide/simethicone Suspension 30 milliLiter(s) Oral every 4 hours PRN Dyspepsia  ondansetron Injectable 4 milliGRAM(s) IV Push every 8 hours PRN Nausea and/or Vomiting          T(C): 37.1 (06-30 @ 04:00), Max: 37.1 (06-30 @ 04:00)   HR: 85   BP: 118/74   RR: 18   SpO2: 99%    PHYSICAL EXAM:    CONSTITUTIONAL: NAD, well-developed, well-groomed  EYES: PERRLA; conjunctiva and sclera clear  ENMT: Moist oral mucosa, no pharyngeal injection or exudates; normal dentition  RESPIRATORY: Normal respiratory effort; lungs are clear to auscultation bilaterally  CARDIOVASCULAR: Regular rate and rhythm, normal S1 and S2, no murmur/rub/gallop; No lower extremity edema; Peripheral pulses are 2+ bilaterally  ABDOMEN: Nontender to palpation, normoactive bowel sounds, no rebound/guarding; No hepatosplenomegaly  MUSCULOSKELETAL:  no clubbing or cyanosis of digits; no joint swelling or tenderness to palpation  PSYCH: A+O to person, place, and time; affect appropriate  NEUROLOGY: CN 2-12 are intact and symmetric; no gross sensory deficits       LABS:                        9.1    5.76  )-----------( 356      ( 29 Jun 2025 12:12 )             29.6      06-29    138  |  100  |  37[H]  ----------------------------<  110[H]  4.6   |  27  |  1.09    Ca    9.8      29 Jun 2025 12:12         CAPILLARY BLOOD GLUCOSE      POCT Blood Glucose.: 116 mg/dL (30 Jun 2025 07:34)  POCT Blood Glucose.: 201 mg/dL (29 Jun 2025 21:08)  POCT Blood Glucose.: 150 mg/dL (29 Jun 2025 17:39)  POCT Blood Glucose.: 107 mg/dL (29 Jun 2025 11:20)      RADIOLOGY & ADDITIONAL TESTS:    Imaging Personally Reviewed:  Consultant(s) Notes Reviewed:    Care Discussed with Consultants/Other Providers:

## 2025-06-30 NOTE — PROGRESS NOTE ADULT - PROBLEM SELECTOR PLAN 2
On 6/14/25, TTE with EF 30-35% with wall motion abnormalities. Diuresed with Bumex drip earlier in hospitalization.   - c/w entresto  - f/u HF recs  - s/p cMRI and Nuclear Scan - negative for infiltrative diease  - pt found to have a new malignancy. will now plan to first have a CT coronaries completed to r/o major disease before proceeding with a [facility].  - pending CTA coronaries

## 2025-06-30 NOTE — CHART NOTE - NSCHARTNOTEFT_GEN_A_CORE
NUTRITION FOLLOW UP NOTE    PATIENT SEEN FOR: Length of stay    SOURCE: [x] Patient  [x] Current Medical Record  [] RN  [] Family/support person at bedside  [] Patient unavailable/inappropriate  [] Other:    CHART REVIEWED/EVENTS NOTED.  [] No changes to nutrition care plan to note  [x] Nutrition Status:  Per chart review:  - PMH: DM  - Acute heart failure, and acute kidney injury superimposed on CKD noted during admission    DIET ORDER:   Diet, Consistent Carbohydrate w/Evening Snack:   Supplement Feeding Modality:  Oral  Glucerna Shake Cans or Servings Per Day:  1       Frequency:  Daily (06-23-25)      CURRENT DIET ORDER IS:  [] Appropriate:  [] Inadequate:  [x] Other: Please see below for recommendations    NUTRITION INTAKE/PROVISION:  [x] PO: Per nursing flowsheets, fluctuating fair/adequate p.o. intake noted during admission, %. Patient visited today (6/30). Patient appearing disoriented during visit, question accuracy. Patient reports poor p.o. intake this admission and reports not consuming ordered oral nutrition supplement. Offered alternate supplements for p.o. optimization, patient declining at this time. Dietary preferences reviewed, food and nutrition services to be made aware.  [] Enteral Nutrition:  [] Parenteral Nutrition:    ANTHROPOMETRICS:  Drug Dosing Weight  Height (cm): 162.6 (24 Jun 2025 11:47)  Weight (kg): 97.9 (24 Jun 2025 11:47)  BMI (kg/m2): 37 (24 Jun 2025 11:47)    Weights:   Per nursing flowsheets, patient's weights this admission: 213.6lbs (6/24), 214.9lbs (6/25), 220.4lbs (6/26), 217.3lbs (6/27), 218.6lbs (6/28), 218.2lbs (6/29), 218.2lbs (6/30). Weight fluctuations noted, question accuracy. Weights obtained via bed scale.    MEDICATIONS:  MEDICATIONS  (STANDING):  aspirin  chewable 81 milliGRAM(s) Oral daily  atorvastatin 80 milliGRAM(s) Oral at bedtime  divalproex  milliGRAM(s) Oral two times a day  furosemide    Tablet 40 milliGRAM(s) Oral daily  insulin lispro (ADMELOG) corrective regimen sliding scale   SubCutaneous three times a day before meals  insulin lispro (ADMELOG) corrective regimen sliding scale   SubCutaneous at bedtime  melatonin 3 milliGRAM(s) Oral at bedtime  metoprolol succinate ER 25 milliGRAM(s) Oral daily  metoprolol tartrate Injectable 5 milliGRAM(s) IV Push once  pantoprazole    Tablet 40 milliGRAM(s) Oral before breakfast  polyethylene glycol 3350 17 Gram(s) Oral at bedtime  sacubitril 24 mG/valsartan 26 mG 1 Tablet(s) Oral two times a day  senna 2 Tablet(s) Oral at bedtime    MEDICATIONS  (PRN):  acetaminophen     Tablet .. 650 milliGRAM(s) Oral every 6 hours PRN Temp greater or equal to 38C (100.4F), Mild Pain (1 - 3)  aluminum hydroxide/magnesium hydroxide/simethicone Suspension 30 milliLiter(s) Oral every 4 hours PRN Dyspepsia  ondansetron Injectable 4 milliGRAM(s) IV Push every 8 hours PRN Nausea and/or Vomiting      NUTRITIONALLY PERTINENT LABS:  06-29 Na138 mmol/L Glu 110 mg/dL[H] K+ 4.6 mmol/L Cr  1.09 mg/dL BUN 37 mg/dL[H] 06-14 Chol 143 mg/dL LDL --    HDL 46 mg/dL[L] Trig 78 mg/dL  06-14-25 @ 01:53 a1c 5.3    A1C with Estimated Average Glucose Result: 5.3 % (06-14-25 @ 01:53)      Finger Sticks:  POCT Blood Glucose.: 124 mg/dL (06-30 @ 11:36)  POCT Blood Glucose.: 116 mg/dL (06-30 @ 07:34)  POCT Blood Glucose.: 201 mg/dL (06-29 @ 21:08)  POCT Blood Glucose.: 150 mg/dL (06-29 @ 17:39)      NUTRITIONALLY PERTINENT MEDICATIONS/LABS:  [x] Reviewed  [x] Relevant notes on medications/labs:  Pertinent Medications:   - Insulin (ADMELOG)  - Diuretics in use  - Zofran prn    Pertinent Labs:  - POCT  (H), Glucose 110 (H) 6/29. Hyperglycemia noted during admission, recent fingersticks: 91-201mg/dL (6/28-6/30)  - BUN 37 (H), GFR 55 (L) 6/29    EDEMA:  [x] Reviewed  [x] Relevant notes: Per nursing flowsheets, 1+ generalized edema noted     GI/ I&O:  [x] Reviewed  [] Relevant notes:  [x] Other: Patient reports last BM 6/28. Bowel regimen ordered (Miralax, Senna)    SKIN:   [] No pressure injuries documented, per nursing flowsheet  [x] Pressure injury previously noted: Per wound care 6/16. "b/l buttocks: hyperpigmentation v DTI"  [] Change in pressure injury documentation:  [] Other:    ESTIMATED NEEDS:  [x] No change:  [] Updated:  Energy: 7920-9662 kcal/day (28-32 kcal/kg)  Protein: 65-76 g/day (1.2-1.4 g/kg)  Fluid:   ml/day or [x] defer to team  Based on: Ideal body weight 54.4kg    NUTRITION DIAGNOSIS:  [x] Prior Dx: Increased Nutrient Needs  [] New Dx:    EDUCATION:  [] Yes:  [x] Not appropriate/warranted    NUTRITION CARE PLAN:  1. Diet: Continue Consistent Carbohydrate Diet  - RD to honor and provide patient's dietary preferences as able to promote p.o. intake   2. Supplements: Recommend d/c Glucerna Shake once daily as patient reports not consuming   3. Multivitamin/mineral supplementation: Recommend Multivitamin and Ascorbic Acid to promote pressure injury healing pending no medical contraindications  4. HF noted during admission, continue daily weights   5. Monitor routine weights, nutrition and renal related labs, fingersticks, p.o. intake and tolerance, oral nutrition supplement compliance, and skin integrity    [] Achieved - Continue current nutrition intervention(s)  [] Current medical condition precludes nutrition intervention at this time.    MONITORING AND EVALUATION:   RD remains available upon request and will follow up per protocol.    Jessica Mccormack, MS, RD, CDN   Available on MS teams

## 2025-07-01 DIAGNOSIS — C34.90 MALIGNANT NEOPLASM OF UNSPECIFIED PART OF UNSPECIFIED BRONCHUS OR LUNG: ICD-10-CM

## 2025-07-01 LAB
GLUCOSE BLDC GLUCOMTR-MCNC: 105 MG/DL — HIGH (ref 70–99)
GLUCOSE BLDC GLUCOMTR-MCNC: 158 MG/DL — HIGH (ref 70–99)
GLUCOSE BLDC GLUCOMTR-MCNC: 95 MG/DL — SIGNIFICANT CHANGE UP (ref 70–99)
GLUCOSE BLDC GLUCOMTR-MCNC: 97 MG/DL — SIGNIFICANT CHANGE UP (ref 70–99)

## 2025-07-01 PROCEDURE — 76942 ECHO GUIDE FOR BIOPSY: CPT

## 2025-07-01 PROCEDURE — 97110 THERAPEUTIC EXERCISES: CPT

## 2025-07-01 PROCEDURE — 36600 WITHDRAWAL OF ARTERIAL BLOOD: CPT

## 2025-07-01 PROCEDURE — 84155 ASSAY OF PROTEIN SERUM: CPT

## 2025-07-01 PROCEDURE — 97161 PT EVAL LOW COMPLEX 20 MIN: CPT

## 2025-07-01 PROCEDURE — 88341 IMHCHEM/IMCYTCHM EA ADD ANTB: CPT

## 2025-07-01 PROCEDURE — 82140 ASSAY OF AMMONIA: CPT

## 2025-07-01 PROCEDURE — A9561: CPT

## 2025-07-01 PROCEDURE — 84145 PROCALCITONIN (PCT): CPT

## 2025-07-01 PROCEDURE — 71250 CT THORAX DX C-: CPT

## 2025-07-01 PROCEDURE — 84443 ASSAY THYROID STIM HORMONE: CPT

## 2025-07-01 PROCEDURE — 83735 ASSAY OF MAGNESIUM: CPT

## 2025-07-01 PROCEDURE — 83986 ASSAY PH BODY FLUID NOS: CPT

## 2025-07-01 PROCEDURE — 85025 COMPLETE CBC W/AUTO DIFF WBC: CPT

## 2025-07-01 PROCEDURE — 85610 PROTHROMBIN TIME: CPT

## 2025-07-01 PROCEDURE — 83880 ASSAY OF NATRIURETIC PEPTIDE: CPT

## 2025-07-01 PROCEDURE — A9585: CPT

## 2025-07-01 PROCEDURE — 88184 FLOWCYTOMETRY/ TC 1 MARKER: CPT

## 2025-07-01 PROCEDURE — 80061 LIPID PANEL: CPT

## 2025-07-01 PROCEDURE — 95816 EEG AWAKE AND DROWSY: CPT

## 2025-07-01 PROCEDURE — 88333 PATH CONSLTJ SURG CYTO XM 1: CPT

## 2025-07-01 PROCEDURE — 82435 ASSAY OF BLOOD CHLORIDE: CPT

## 2025-07-01 PROCEDURE — 70551 MRI BRAIN STEM W/O DYE: CPT

## 2025-07-01 PROCEDURE — 84132 ASSAY OF SERUM POTASSIUM: CPT

## 2025-07-01 PROCEDURE — 88173 CYTOPATH EVAL FNA REPORT: CPT

## 2025-07-01 PROCEDURE — 80048 BASIC METABOLIC PNL TOTAL CA: CPT

## 2025-07-01 PROCEDURE — 88305 TISSUE EXAM BY PATHOLOGIST: CPT

## 2025-07-01 PROCEDURE — 82945 GLUCOSE OTHER FLUID: CPT

## 2025-07-01 PROCEDURE — 84156 ASSAY OF PROTEIN URINE: CPT

## 2025-07-01 PROCEDURE — 94660 CPAP INITIATION&MGMT: CPT

## 2025-07-01 PROCEDURE — 75561 CARDIAC MRI FOR MORPH W/DYE: CPT

## 2025-07-01 PROCEDURE — 82042 OTHER SOURCE ALBUMIN QUAN EA: CPT

## 2025-07-01 PROCEDURE — 85520 HEPARIN ASSAY: CPT

## 2025-07-01 PROCEDURE — 94640 AIRWAY INHALATION TREATMENT: CPT

## 2025-07-01 PROCEDURE — 0241U: CPT

## 2025-07-01 PROCEDURE — 87102 FUNGUS ISOLATION CULTURE: CPT

## 2025-07-01 PROCEDURE — 80164 ASSAY DIPROPYLACETIC ACD TOT: CPT

## 2025-07-01 PROCEDURE — 83605 ASSAY OF LACTIC ACID: CPT

## 2025-07-01 PROCEDURE — 89051 BODY FLUID CELL COUNT: CPT

## 2025-07-01 PROCEDURE — 85014 HEMATOCRIT: CPT

## 2025-07-01 PROCEDURE — 84166 PROTEIN E-PHORESIS/URINE/CSF: CPT

## 2025-07-01 PROCEDURE — 82330 ASSAY OF CALCIUM: CPT

## 2025-07-01 PROCEDURE — 87075 CULTR BACTERIA EXCEPT BLOOD: CPT

## 2025-07-01 PROCEDURE — C8924: CPT

## 2025-07-01 PROCEDURE — 86901 BLOOD TYPING SEROLOGIC RH(D): CPT

## 2025-07-01 PROCEDURE — 84100 ASSAY OF PHOSPHORUS: CPT

## 2025-07-01 PROCEDURE — 70450 CT HEAD/BRAIN W/O DYE: CPT

## 2025-07-01 PROCEDURE — 71046 X-RAY EXAM CHEST 2 VIEWS: CPT

## 2025-07-01 PROCEDURE — 97112 NEUROMUSCULAR REEDUCATION: CPT

## 2025-07-01 PROCEDURE — 86900 BLOOD TYPING SEROLOGIC ABO: CPT

## 2025-07-01 PROCEDURE — 84295 ASSAY OF SERUM SODIUM: CPT

## 2025-07-01 PROCEDURE — 78830 RP LOCLZJ TUM SPECT W/CT 1: CPT

## 2025-07-01 PROCEDURE — 36415 COLL VENOUS BLD VENIPUNCTURE: CPT

## 2025-07-01 PROCEDURE — 88313 SPECIAL STAINS GROUP 2: CPT

## 2025-07-01 PROCEDURE — 71045 X-RAY EXAM CHEST 1 VIEW: CPT

## 2025-07-01 PROCEDURE — 87070 CULTURE OTHR SPECIMN AEROBIC: CPT

## 2025-07-01 PROCEDURE — 88360 TUMOR IMMUNOHISTOCHEM/MANUAL: CPT

## 2025-07-01 PROCEDURE — 85027 COMPLETE CBC AUTOMATED: CPT

## 2025-07-01 PROCEDURE — 86850 RBC ANTIBODY SCREEN: CPT

## 2025-07-01 PROCEDURE — 99231 SBSQ HOSP IP/OBS SF/LOW 25: CPT

## 2025-07-01 PROCEDURE — 97530 THERAPEUTIC ACTIVITIES: CPT

## 2025-07-01 PROCEDURE — 74176 CT ABD & PELVIS W/O CONTRAST: CPT

## 2025-07-01 PROCEDURE — 82962 GLUCOSE BLOOD TEST: CPT

## 2025-07-01 PROCEDURE — 82803 BLOOD GASES ANY COMBINATION: CPT

## 2025-07-01 PROCEDURE — 82306 VITAMIN D 25 HYDROXY: CPT

## 2025-07-01 PROCEDURE — 86335 IMMUNFIX E-PHORSIS/URINE/CSF: CPT

## 2025-07-01 PROCEDURE — 88185 FLOWCYTOMETRY/TC ADD-ON: CPT

## 2025-07-01 PROCEDURE — 38505 NEEDLE BIOPSY LYMPH NODES: CPT

## 2025-07-01 PROCEDURE — 88112 CYTOPATH CELL ENHANCE TECH: CPT

## 2025-07-01 PROCEDURE — 88342 IMHCHEM/IMCYTCHM 1ST ANTB: CPT

## 2025-07-01 PROCEDURE — 83036 HEMOGLOBIN GLYCOSYLATED A1C: CPT

## 2025-07-01 PROCEDURE — 85730 THROMBOPLASTIN TIME PARTIAL: CPT

## 2025-07-01 PROCEDURE — 85018 HEMOGLOBIN: CPT

## 2025-07-01 PROCEDURE — 84157 ASSAY OF PROTEIN OTHER: CPT

## 2025-07-01 PROCEDURE — 87205 SMEAR GRAM STAIN: CPT

## 2025-07-01 PROCEDURE — 99232 SBSQ HOSP IP/OBS MODERATE 35: CPT

## 2025-07-01 PROCEDURE — 82947 ASSAY GLUCOSE BLOOD QUANT: CPT

## 2025-07-01 PROCEDURE — 93005 ELECTROCARDIOGRAM TRACING: CPT

## 2025-07-01 PROCEDURE — 83521 IG LIGHT CHAINS FREE EACH: CPT

## 2025-07-01 PROCEDURE — 86334 IMMUNOFIX E-PHORESIS SERUM: CPT

## 2025-07-01 PROCEDURE — A9538: CPT

## 2025-07-01 PROCEDURE — 87015 SPECIMEN INFECT AGNT CONCNTJ: CPT

## 2025-07-01 PROCEDURE — 93306 TTE W/DOPPLER COMPLETE: CPT

## 2025-07-01 PROCEDURE — 83615 LACTATE (LD) (LDH) ENZYME: CPT

## 2025-07-01 PROCEDURE — 80053 COMPREHEN METABOLIC PANEL: CPT

## 2025-07-01 PROCEDURE — 84165 PROTEIN E-PHORESIS SERUM: CPT

## 2025-07-01 RX ORDER — METOPROLOL SUCCINATE 50 MG/1
5 TABLET, EXTENDED RELEASE ORAL ONCE
Refills: 0 | Status: COMPLETED | OUTPATIENT
Start: 2025-07-01 | End: 2025-07-01

## 2025-07-01 RX ORDER — SPIRONOLACTONE 25 MG
25 TABLET ORAL DAILY
Refills: 0 | Status: DISCONTINUED | OUTPATIENT
Start: 2025-07-02 | End: 2025-07-07

## 2025-07-01 RX ADMIN — INSULIN LISPRO 1: 100 INJECTION, SOLUTION INTRAVENOUS; SUBCUTANEOUS at 17:31

## 2025-07-01 RX ADMIN — Medication 81 MILLIGRAM(S): at 11:33

## 2025-07-01 RX ADMIN — SACUBITRIL AND VALSARTAN 1 TABLET(S): 6; 6 PELLET ORAL at 17:19

## 2025-07-01 RX ADMIN — Medication 3 MILLIGRAM(S): at 22:27

## 2025-07-01 RX ADMIN — METOPROLOL SUCCINATE 5 MILLIGRAM(S): 50 TABLET, EXTENDED RELEASE ORAL at 17:26

## 2025-07-01 RX ADMIN — POLYETHYLENE GLYCOL 3350 17 GRAM(S): 17 POWDER, FOR SOLUTION ORAL at 22:28

## 2025-07-01 RX ADMIN — Medication 500 MILLIGRAM(S): at 05:25

## 2025-07-01 RX ADMIN — METOPROLOL SUCCINATE 25 MILLIGRAM(S): 50 TABLET, EXTENDED RELEASE ORAL at 05:25

## 2025-07-01 RX ADMIN — FUROSEMIDE 40 MILLIGRAM(S): 10 INJECTION INTRAMUSCULAR; INTRAVENOUS at 05:25

## 2025-07-01 RX ADMIN — ATORVASTATIN CALCIUM 80 MILLIGRAM(S): 80 TABLET, FILM COATED ORAL at 22:27

## 2025-07-01 RX ADMIN — SACUBITRIL AND VALSARTAN 1 TABLET(S): 6; 6 PELLET ORAL at 05:26

## 2025-07-01 RX ADMIN — Medication 40 MILLIGRAM(S): at 05:25

## 2025-07-01 RX ADMIN — Medication 2 TABLET(S): at 22:28

## 2025-07-01 RX ADMIN — Medication 500 MILLIGRAM(S): at 18:23

## 2025-07-01 NOTE — PROGRESS NOTE ADULT - PROBLEM SELECTOR PLAN 3
MIKAELA on CKD3b  Renal function much improved  Creatinine Trend: 1.09<--, 1.09<--, 1.16<--, 1.14<--, 1.27<--, 1.48<--

## 2025-07-01 NOTE — PROGRESS NOTE ADULT - SUBJECTIVE AND OBJECTIVE BOX
Andre Reyes, M.D.  Office: [phone]  Available thru [employer] Teams     Patient is a 69y old  Female who presents with a chief complaint of Acute decompensated heart failure (30 Jun 2025 10:23)          SUBJECTIVE / OVERNIGHT EVENTS:    No acute overnight events.        MEDICATIONS  (STANDING):  aspirin  chewable 81 milliGRAM(s) Oral daily  atorvastatin 80 milliGRAM(s) Oral at bedtime  divalproex  milliGRAM(s) Oral two times a day  furosemide    Tablet 40 milliGRAM(s) Oral daily  insulin lispro (ADMELOG) corrective regimen sliding scale   SubCutaneous three times a day before meals  insulin lispro (ADMELOG) corrective regimen sliding scale   SubCutaneous at bedtime  melatonin 3 milliGRAM(s) Oral at bedtime  metoprolol succinate ER 25 milliGRAM(s) Oral daily  metoprolol tartrate Injectable 5 milliGRAM(s) IV Push once  pantoprazole    Tablet 40 milliGRAM(s) Oral before breakfast  polyethylene glycol 3350 17 Gram(s) Oral at bedtime  sacubitril 24 mG/valsartan 26 mG 1 Tablet(s) Oral two times a day  senna 2 Tablet(s) Oral at bedtime    MEDICATIONS  (PRN):  acetaminophen     Tablet .. 650 milliGRAM(s) Oral every 6 hours PRN Temp greater or equal to 38C (100.4F), Mild Pain (1 - 3)  aluminum hydroxide/magnesium hydroxide/simethicone Suspension 30 milliLiter(s) Oral every 4 hours PRN Dyspepsia  ondansetron Injectable 4 milliGRAM(s) IV Push every 8 hours PRN Nausea and/or Vomiting          T(C): 36.9 (07-01 @ 04:37), Max: 37.2 (06-30 @ 11:01)   HR: 82   BP: 129/72   RR: 18   SpO2: 97%    PHYSICAL EXAM:    CONSTITUTIONAL: NAD, well-developed, well-groomed  EYES: PERRLA; conjunctiva and sclera clear  ENMT: Moist oral mucosa, no pharyngeal injection or exudates; normal dentition  RESPIRATORY: Normal respiratory effort; lungs are clear to auscultation bilaterally  CARDIOVASCULAR: Regular rate and rhythm, normal S1 and S2, no murmur/rub/gallop; No lower extremity edema; Peripheral pulses are 2+ bilaterally  ABDOMEN: Nontender to palpation, normoactive bowel sounds, no rebound/guarding; No hepatosplenomegaly  MUSCULOSKELETAL:  no clubbing or cyanosis of digits; no joint swelling or tenderness to palpation  PSYCH: A+O to person, place, and time; affect appropriate  NEUROLOGY: CN 2-12 are intact and symmetric; no gross sensory deficits       LABS:                        9.1    5.76  )-----------( 356      ( 29 Jun 2025 12:12 )             29.6      06-29    138  |  100  |  37[H]  ----------------------------<  110[H]  4.6   |  27  |  1.09    Ca    9.8      29 Jun 2025 12:12         CAPILLARY BLOOD GLUCOSE      POCT Blood Glucose.: 95 mg/dL (01 Jul 2025 07:17)  POCT Blood Glucose.: 149 mg/dL (30 Jun 2025 21:11)  POCT Blood Glucose.: 291 mg/dL (30 Jun 2025 17:09)  POCT Blood Glucose.: 124 mg/dL (30 Jun 2025 11:36)      RADIOLOGY & ADDITIONAL TESTS:    Imaging Personally Reviewed:  Consultant(s) Notes Reviewed:    Care Discussed with Consultants/Other Providers:   Andre Reyes, M.D.  Office: [phone]  Available thru [employer] Teams     Patient is a 69y old  Female who presents with a chief complaint of Acute decompensated heart failure (30 Jun 2025 10:23)          SUBJECTIVE / OVERNIGHT EVENTS:    No acute overnight events.  tearful because she was not able to sleep all night due to her disruptive neighbor      MEDICATIONS  (STANDING):  aspirin  chewable 81 milliGRAM(s) Oral daily  atorvastatin 80 milliGRAM(s) Oral at bedtime  divalproex  milliGRAM(s) Oral two times a day  furosemide    Tablet 40 milliGRAM(s) Oral daily  insulin lispro (ADMELOG) corrective regimen sliding scale   SubCutaneous three times a day before meals  insulin lispro (ADMELOG) corrective regimen sliding scale   SubCutaneous at bedtime  melatonin 3 milliGRAM(s) Oral at bedtime  metoprolol succinate ER 25 milliGRAM(s) Oral daily  metoprolol tartrate Injectable 5 milliGRAM(s) IV Push once  pantoprazole    Tablet 40 milliGRAM(s) Oral before breakfast  polyethylene glycol 3350 17 Gram(s) Oral at bedtime  sacubitril 24 mG/valsartan 26 mG 1 Tablet(s) Oral two times a day  senna 2 Tablet(s) Oral at bedtime    MEDICATIONS  (PRN):  acetaminophen     Tablet .. 650 milliGRAM(s) Oral every 6 hours PRN Temp greater or equal to 38C (100.4F), Mild Pain (1 - 3)  aluminum hydroxide/magnesium hydroxide/simethicone Suspension 30 milliLiter(s) Oral every 4 hours PRN Dyspepsia  ondansetron Injectable 4 milliGRAM(s) IV Push every 8 hours PRN Nausea and/or Vomiting          T(C): 36.9 (07-01 @ 04:37), Max: 37.2 (06-30 @ 11:01)   HR: 82   BP: 129/72   RR: 18   SpO2: 97%    PHYSICAL EXAM:    CONSTITUTIONAL: NAD, well-developed, well-groomed  EYES: PERRLA; conjunctiva and sclera clear  ENMT: Moist oral mucosa, no pharyngeal injection or exudates; normal dentition  RESPIRATORY: Normal respiratory effort; lungs are clear to auscultation bilaterally  CARDIOVASCULAR: Regular rate and rhythm, normal S1 and S2, no murmur/rub/gallop; No lower extremity edema; Peripheral pulses are 2+ bilaterally  ABDOMEN: Nontender to palpation, normoactive bowel sounds, no rebound/guarding; No hepatosplenomegaly  MUSCULOSKELETAL:  no clubbing or cyanosis of digits; no joint swelling or tenderness to palpation  PSYCH: A+O to person, place, and time; affect appropriate  NEUROLOGY: CN 2-12 are intact and symmetric; no gross sensory deficits       LABS:                        9.1    5.76  )-----------( 356      ( 29 Jun 2025 12:12 )             29.6      06-29    138  |  100  |  37[H]  ----------------------------<  110[H]  4.6   |  27  |  1.09    Ca    9.8      29 Jun 2025 12:12         CAPILLARY BLOOD GLUCOSE      POCT Blood Glucose.: 95 mg/dL (01 Jul 2025 07:17)  POCT Blood Glucose.: 149 mg/dL (30 Jun 2025 21:11)  POCT Blood Glucose.: 291 mg/dL (30 Jun 2025 17:09)  POCT Blood Glucose.: 124 mg/dL (30 Jun 2025 11:36)      RADIOLOGY & ADDITIONAL TESTS:    Imaging Personally Reviewed:  Consultant(s) Notes Reviewed:    Care Discussed with Consultants/Other Providers:

## 2025-07-01 NOTE — PROGRESS NOTE ADULT - ASSESSMENT
68 YO F with a history of HTN, bilateral CTS spinal stenosis s/p laminectomy, peripheral neuropathy who presented to [facility] with signs of heart failure. TTE showed LV dysfunction with LVEF 30-35%. Workup for cardiomyopathy included cMRI and nuclear scan, both negative for infiltrative disease. CT chest incidentally revealed left supraclavicular, mediastinal, and hilar lymphadenopathy suspicious for malignancy. She underwent a left supraclavicular lymph node biopsy with path positive for adenocarcinoma, additional immunostains favor metastatic lung adenocarcinoma, possible with enteric expression.     Plan:   -- Clinical course and findings discussed with the patient and her son (Ruben: [phone]). They had an opportunity to ask questions, which were answered to their understanding. Plan is for patient to eventually be discharged to [facility] once medically cleared. Will send patient information to [facility]. Patient to see Dr. He as an outpatient to further discuss treatment options.   -- PET/CT- outpatient.     Izabella Deleon PA-C  [facility]- [facility]   Available on TEAMS   70 YO F with a history of HTN, bilateral CTS spinal stenosis s/p laminectomy, peripheral neuropathy who presented to [facility] with signs of heart failure. TTE showed LV dysfunction with LVEF 30-35%. Workup for cardiomyopathy included cMRI and nuclear scan, both negative for infiltrative disease. CT chest incidentally revealed left supraclavicular, mediastinal, and hilar lymphadenopathy suspicious for malignancy. She underwent a left supraclavicular lymph node biopsy with path positive for adenocarcinoma, additional immunostains favor metastatic lung adenocarcinoma, possible with enteric expression.

## 2025-07-01 NOTE — PROGRESS NOTE ADULT - PROBLEM SELECTOR PLAN 4
CT chest with L supraclavicular, mediastinal and hilar lymphadenopathy  - d/w pt and family re concern for cancer, wish to proceed with further eval.   - s/p L supraclavicular bx. --> path showing adenocarcinoma, pending stains --> oncology recommends outpatient CT/PET scan and cancer navigation following

## 2025-07-01 NOTE — PROGRESS NOTE ADULT - PROBLEM SELECTOR PLAN 1
-- Clinical course and findings discussed with the patient and her son (Ruben: [phone]). They had an opportunity to ask questions, which were answered to their understanding. Plan is for patient to eventually be discharged to [facility] once medically cleared. Will send patient information to [facility]. Patient to see Dr. He as an outpatient to further discuss treatment options.   -- PET/CT- outpatient.      Izabella Deleon PA-C  [facility]- [facility]   Available on TEAMS

## 2025-07-01 NOTE — PROGRESS NOTE ADULT - ASSESSMENT
69F presenting with acute hypoxemic respiratory failure secondary to acute decompensated heart failure.

## 2025-07-01 NOTE — PROGRESS NOTE ADULT - SUBJECTIVE AND OBJECTIVE BOX
INTERVAL HPI/OVERNIGHT EVENTS: patient improved more awake breathing comfortably,     awaiting final diagnosis for seitilogy of adenocarcinoma in lymph node    MEDICATIONS  (STANDING):  aspirin  chewable 81 milliGRAM(s) Oral daily  atorvastatin 80 milliGRAM(s) Oral at bedtime  divalproex  milliGRAM(s) Oral two times a day  furosemide    Tablet 40 milliGRAM(s) Oral daily  insulin lispro (ADMELOG) corrective regimen sliding scale   SubCutaneous three times a day before meals  insulin lispro (ADMELOG) corrective regimen sliding scale   SubCutaneous at bedtime  melatonin 3 milliGRAM(s) Oral at bedtime  metoprolol succinate ER 25 milliGRAM(s) Oral daily  metoprolol tartrate Injectable 5 milliGRAM(s) IV Push once  pantoprazole    Tablet 40 milliGRAM(s) Oral before breakfast  polyethylene glycol 3350 17 Gram(s) Oral at bedtime  sacubitril 24 mG/valsartan 26 mG 1 Tablet(s) Oral two times a day  senna 2 Tablet(s) Oral at bedtime    MEDICATIONS  (PRN):  acetaminophen     Tablet .. 650 milliGRAM(s) Oral every 6 hours PRN Temp greater or equal to 38C (100.4F), Mild Pain (1 - 3)  aluminum hydroxide/magnesium hydroxide/simethicone Suspension 30 milliLiter(s) Oral every 4 hours PRN Dyspepsia  ondansetron Injectable 4 milliGRAM(s) IV Push every 8 hours PRN Nausea and/or Vomiting      Allergies    shellfish (Other; Short breath)  No Known Drug Allergies    Intolerances    lactose (Unknown)    ROS:  General: Pt denies recent weight loss/fever/chills    Neurological: denies numbness or  sensation loss    Cardiovascular: denies chest pain/palpitations/leg edema    Respiratory and Thorax: denies SOB/cough/wheezing    Gastrointestinal: denies abdominal pain/diarrhea/constipation/bloody stool    Genitourinary: denies urinary frequency/urgency/ dysuria    Musculoskeletal: denies joint pain or swelling, denies restricted motion    Hematologic: denies abnormal bleeding  	    	  	    		        	    	            Vital Signs Last 24 Hrs  T(C): 37.3 (2025 16:26), Max: 37.3 (2025 16:26)  T(F): 99.1 (2025 16:26), Max: 99.1 (2025 16:26)  HR: 84 (2025 16:26) (80 - 86)  BP: 145/73 (2025 16:26) (107/68 - 145/73)  BP(mean): --  RR: 18 (2025 16:26) (18 - 18)  SpO2: 98% (2025 16:26) (96% - 98%)    Parameters below as of 2025 16:26  Patient On (Oxygen Delivery Method): nasal cannula  O2 Flow (L/min): 1    Daily     Daily Weight in k.2 (2025 07:02)    06-30 @ : @ 07:00  --------------------------------------------------------  IN: 480 mL / OUT: 800 mL / NET: -320 mL     @ 07: @ 20:03  --------------------------------------------------------  IN: 0 mL / OUT: 900 mL / NET: -900 mL      Physical Exam:    wdwn female  no JVD  cor RRR  lung clear  abd soft   ext no edema      LABS:                RADIOLOGY & ADDITIONAL TESTS:    TELE:    EKG:

## 2025-07-01 NOTE — PROGRESS NOTE ADULT - SUBJECTIVE AND OBJECTIVE BOX
INTERVAL HPI/OVERNIGHT EVENTS:  Patient S&E at bedside. No o/n events, patient resting comfortably. No complaints at this time. Patient denies fever, chills, dizziness, weakness, CP, palpitations, SOB, cough, N/V/D/C, dysuria, changes in bowel movements, LE edema.    VITAL SIGNS:  T(F): 98.2 (07-01-25 @ 10:51)  HR: 80 (07-01-25 @ 10:51)  BP: 107/68 (07-01-25 @ 10:51)  RR: 18 (07-01-25 @ 10:51)  SpO2: 96% (07-01-25 @ 10:51)  Wt(kg): --    PHYSICAL EXAM:    Constitutional: NAD  Eyes: EOMI, sclera non-icteric  Neck: supple, no masses, no JVD  Respiratory: CTA b/l, good air entry b/l  Cardiovascular: RRR, no M/R/G  Gastrointestinal: soft, NTND, no masses palpable, + BS, no hepatosplenomegaly  Extremities: no c/c/e  Neurological: AAOx3      MEDICATIONS  (STANDING):  aspirin  chewable 81 milliGRAM(s) Oral daily  atorvastatin 80 milliGRAM(s) Oral at bedtime  divalproex  milliGRAM(s) Oral two times a day  furosemide    Tablet 40 milliGRAM(s) Oral daily  insulin lispro (ADMELOG) corrective regimen sliding scale   SubCutaneous three times a day before meals  insulin lispro (ADMELOG) corrective regimen sliding scale   SubCutaneous at bedtime  melatonin 3 milliGRAM(s) Oral at bedtime  metoprolol succinate ER 25 milliGRAM(s) Oral daily  metoprolol tartrate Injectable 5 milliGRAM(s) IV Push once  pantoprazole    Tablet 40 milliGRAM(s) Oral before breakfast  polyethylene glycol 3350 17 Gram(s) Oral at bedtime  sacubitril 24 mG/valsartan 26 mG 1 Tablet(s) Oral two times a day  senna 2 Tablet(s) Oral at bedtime    MEDICATIONS  (PRN):  acetaminophen     Tablet .. 650 milliGRAM(s) Oral every 6 hours PRN Temp greater or equal to 38C (100.4F), Mild Pain (1 - 3)  aluminum hydroxide/magnesium hydroxide/simethicone Suspension 30 milliLiter(s) Oral every 4 hours PRN Dyspepsia  ondansetron Injectable 4 milliGRAM(s) IV Push every 8 hours PRN Nausea and/or Vomiting      Allergies    shellfish (Other; Short breath)  No Known Drug Allergies    Intolerances    lactose (Unknown)      LABS:                        9.1    5.76  )-----------( 356      ( 29 Jun 2025 12:12 )             29.6     06-29    138  |  100  |  37[H]  ----------------------------<  110[H]  4.6   |  27  |  1.09    Ca    9.8      29 Jun 2025 12:12        Urinalysis Basic - ( 29 Jun 2025 12:12 )    Color: x / Appearance: x / SG: x / pH: x  Gluc: 110 mg/dL / Ketone: x  / Bili: x / Urobili: x   Blood: x / Protein: x / Nitrite: x   Leuk Esterase: x / RBC: x / WBC x   Sq Epi: x / Non Sq Epi: x / Bacteria: x        RADIOLOGY & ADDITIONAL TESTS:  Cytopathology - Non Gyn Report:   ACCESSION No: [id number]   Patient: SHREE WYATT   Accession: [id number]   Collected Date/Time: 6/24/2025 14:03 EDT   Received Date/Time: 6/24/2025 18:24 EDT   Fine Needle Aspiration Addendum Report - Auth (Verified)   Addendum   Immunostains results are as follows:   TTF1(strong), CDX-2(weak), CK7, CK19, CEA(focal sparse): positive   PAX-8, GATA3, TRPS1, CK20, ER: negative.   In summary:   Favor metastatic adenocarcinoma of lung origin, possible with enteric   expression, however,   upper GI origin cannot be completely excluded.   NGS, HER2 and MET studies are pending   Verified by: Shoaib Rodriguez M.D.   (Electronic Signature)   Reported on: 06/30/25 19:10 EDT, [facility] [employer] [address] [place] [address]   Phone: [phone] Fax: [phone]   _________________________________________________________________   Fine Needle Aspiration Report - Auth (Verified)   Specimen(s) Submitted   LYMPH NODE, SUPRACLAVICULAR, LEFT, US GUIDED CORE BIOPSY   Final Diagnosis   LYMPH NODE, SUPRACLAVICULAR, LEFT, US GUIDED TOUCH PREP AND CORE   BIOPSY   POSITIVE FOR MALIGNANT CELLS.

## 2025-07-01 NOTE — PROGRESS NOTE ADULT - PROBLEM SELECTOR PLAN 2
On 6/14/25, TTE with EF 30-35% with wall motion abnormalities. Diuresed with Bumex drip earlier in hospitalization.   - GDMT: +BB. + ARNI, +MRA, SGLT2i upon discharge.  - s/p cMRI and Nuclear Scan - negative for infiltrative diease  - pt found to have a new malignancy. will now plan to first have a CT coronaries completed to r/o major disease before proceeding with a LHC.  - pending CTA coronaries. her HR is above goal and has been prohibitive. I d/w Dr. Hinojosa, patient may benefit from diagnostic LHC, further per her recommendations.

## 2025-07-01 NOTE — PROGRESS NOTE ADULT - ASSESSMENT
69F with PMH of HTN, DM2, VERONIKA, sciatica, peripheral neuropathy, hx of seizure initially presented to [facility] on 6/10/25 with progressively worsening SOB, admitted for ADHF and possible pneumonia. EKG LBBB  Received IV diuretics (lasix) and antibiotics for pneumonia and was transferred to CCU. Ultimately transferred to [facility] CICU for further management. On arrival, she was on BIPAP & started on IV diuresis for respiratory distress & significant pulmonary edema. She has since been downgraded to telemetry & is on 2L NC. She has responded well to diuresis and GDMT. S/p thoracentesis 6/19    SHe is now s/p cMR and NM amyloid scan which are negative for infiltrative disease . however biopsy  lymph nose is + for adenocarcinoma    Was sob, improved with furosemide       advise    1continue current CHF  regimen   2. CHF service feels A CTA coronary would be sufficient to exclude obstructive CAD, please arrange, If unable to arrange in timely manner, or HR prevents adequate study, will pursue C this be adequate in light of resting HR 80s  3/ continue remainder meds  4. in light of malignancy w /u hold on CRT D at this time    I will be away from 7/2-7/6 , house cardiology covering

## 2025-07-01 NOTE — PROGRESS NOTE ADULT - PROBLEM SELECTOR PROBLEM 6
Subjective:       Patient ID: Sania Jimenez is a 24 y.o. female.    Vitals:  tympanic temperature is 98.6 °F (37 °C). Her blood pressure is 118/56 (abnormal) and her pulse is 68. Her respiration is 18 and oxygen saturation is 100%.     Chief Complaint: Hematuria    Patient is 24-year-old female who presents to clinic with gross hematuria and dysuria that started yesterday.  She reports small clot at the beginning of her urine stream.  Denies any flank or abdominal pain prior to this happening.  Has had no blood in the urine today but still has some discomfort with urinating.  Last UTI was 1 year ago. No hx of kidney stones. Patient also states that there is a small bump on the outside of her vagina that is slightly tender to touch.  She denies any vaginal discharge, vaginal bleeding, draining from the area, fevers/chills, nausea/vomiting.  Patient is sexually active with her boyfriend but has no concern for STD and says she practices safe sex.  However she would like panel of std testing. LMP 12/13/21.    Hematuria  This is a new problem. Episode onset: 1/01/22. The problem has been gradually improving since onset. She reports clotting at the beginning of her urine stream. Her pain is at a severity of 0/10. The pain is mild. Urine color: dark. Irritative symptoms do not include frequency, nocturia or urgency. Associated symptoms include dysuria. Pertinent negatives include no abdominal pain, chills, facial swelling, fever, flank pain, genital pain, hesitancy, inability to urinate, nausea, vomiting or sore throat. She is sexually active. Her past medical history is significant for UTI (3yrs ago). There is no history of hypertension,  trauma, kidney stones, recent infection, sickle cell disease, STDs or tobacco use.       Constitution: Negative for chills, fatigue and fever.   HENT: Negative for facial swelling and sore throat.    Gastrointestinal: Negative for abdominal pain, nausea and vomiting.   Genitourinary:  Positive for dysuria and hematuria. Negative for frequency, urgency, flank pain, history of kidney stones, vaginal pain, vaginal discharge, vaginal bleeding, vaginal odor and pelvic pain.   Musculoskeletal: Negative for muscle cramps.       Objective:      Physical Exam   Constitutional: She appears well-developed.  Non-toxic appearance. She does not appear ill. No distress.   HENT:   Head: Normocephalic and atraumatic.   Ears:   Right Ear: External ear normal.   Left Ear: External ear normal.   Nose: Nose normal.   Eyes: Conjunctivae and EOM are normal.   Neck: Neck supple.   Pulmonary/Chest: Effort normal.   Abdominal: Normal appearance.   Genitourinary:    Cervix normal.      Pelvic exam was performed with patient supine.   There is no rash or lesion on the left labia.    Vaginal discharge (scant white thin discharge) present.      No vaginal erythema, tenderness or bleeding.   No erythema, tenderness or bleeding in the vagina.   Musculoskeletal: Normal range of motion.         General: Normal range of motion.   Neurological: no focal deficit. She is alert. She displays no weakness. Gait normal.   Skin: Skin is warm, dry, not diaphoretic, not pale and no rash.   Psychiatric: Her behavior is normal. chaperone present ([facility])       Results for orders placed or performed in visit on 01/03/22   POCT Urinalysis, Dipstick, Automated, W/O Scope   Result Value Ref Range    POC Blood, Urine Negative Negative    POC Bilirubin, Urine Positive (A) Negative    POC Urobilinogen, Urine normal 0.1 - 1.1    POC Ketones, Urine Negative Negative    POC Protein, Urine Negative Negative    POC Nitrates, Urine Negative Negative    POC Glucose, Urine Negative Negative    pH, UA 7.0     POC Specific Gravity, Urine 1.020 1.003 - 1.029    POC Leukocytes, Urine Negative Negative           Assessment:       1. Dysuria    2. Possible exposure to STD          Plan:       No sign of infection or hematuria on UA but will send for  culture to confirm.  No obvious lesion, cyst, or abscess to labia on exam.  Will start patient on Bactrim.  She was advised to do warm soaks and continue to monitor to see if symptoms worsen.  STD panel pending.  Refrain from sexual activity until results are received. Continue to monitor symptoms closely and f/u with gynecologist if persist. ED precautions.   Dysuria  -     POCT Urinalysis, Dipstick, Automated, W/O Scope  -     Urine culture  -     C. trachomatis/N. gonorrhoeae by AMP DNA  -     Vaginosis Screen by DNA Probe  -     HIV 1/2 Ag/Ab (4th Gen)  -     RPR  -     sulfamethoxazole-trimethoprim 800-160mg (BACTRIM DS) 800-160 mg Tab; Take 1 tablet by mouth 2 (two) times daily. for 7 days  Dispense: 14 tablet; Refill: 0    Possible exposure to STD  -     C. trachomatis/N. gonorrhoeae by AMP DNA  -     Vaginosis Screen by DNA Probe  -     HIV 1/2 Ag/Ab (4th Gen)  -     RPR                      Seizure

## 2025-07-02 LAB
GLUCOSE BLDC GLUCOMTR-MCNC: 114 MG/DL — HIGH (ref 70–99)
GLUCOSE BLDC GLUCOMTR-MCNC: 131 MG/DL — HIGH (ref 70–99)
GLUCOSE BLDC GLUCOMTR-MCNC: 151 MG/DL — HIGH (ref 70–99)
GLUCOSE BLDC GLUCOMTR-MCNC: 91 MG/DL — SIGNIFICANT CHANGE UP (ref 70–99)

## 2025-07-02 PROCEDURE — A9585: CPT

## 2025-07-02 PROCEDURE — 70551 MRI BRAIN STEM W/O DYE: CPT

## 2025-07-02 PROCEDURE — 0241U: CPT

## 2025-07-02 PROCEDURE — 89051 BODY FLUID CELL COUNT: CPT

## 2025-07-02 PROCEDURE — 83605 ASSAY OF LACTIC ACID: CPT

## 2025-07-02 PROCEDURE — 84132 ASSAY OF SERUM POTASSIUM: CPT

## 2025-07-02 PROCEDURE — 88341 IMHCHEM/IMCYTCHM EA ADD ANTB: CPT

## 2025-07-02 PROCEDURE — 75561 CARDIAC MRI FOR MORPH W/DYE: CPT

## 2025-07-02 PROCEDURE — 82962 GLUCOSE BLOOD TEST: CPT

## 2025-07-02 PROCEDURE — 85018 HEMOGLOBIN: CPT

## 2025-07-02 PROCEDURE — 84443 ASSAY THYROID STIM HORMONE: CPT

## 2025-07-02 PROCEDURE — 87205 SMEAR GRAM STAIN: CPT

## 2025-07-02 PROCEDURE — 86901 BLOOD TYPING SEROLOGIC RH(D): CPT

## 2025-07-02 PROCEDURE — 87075 CULTR BACTERIA EXCEPT BLOOD: CPT

## 2025-07-02 PROCEDURE — 82803 BLOOD GASES ANY COMBINATION: CPT

## 2025-07-02 PROCEDURE — 74176 CT ABD & PELVIS W/O CONTRAST: CPT

## 2025-07-02 PROCEDURE — 82945 GLUCOSE OTHER FLUID: CPT

## 2025-07-02 PROCEDURE — 93005 ELECTROCARDIOGRAM TRACING: CPT

## 2025-07-02 PROCEDURE — 94640 AIRWAY INHALATION TREATMENT: CPT

## 2025-07-02 PROCEDURE — 88184 FLOWCYTOMETRY/ TC 1 MARKER: CPT

## 2025-07-02 PROCEDURE — 82042 OTHER SOURCE ALBUMIN QUAN EA: CPT

## 2025-07-02 PROCEDURE — 84100 ASSAY OF PHOSPHORUS: CPT

## 2025-07-02 PROCEDURE — 87102 FUNGUS ISOLATION CULTURE: CPT

## 2025-07-02 PROCEDURE — 85027 COMPLETE CBC AUTOMATED: CPT

## 2025-07-02 PROCEDURE — 85520 HEPARIN ASSAY: CPT

## 2025-07-02 PROCEDURE — 88185 FLOWCYTOMETRY/TC ADD-ON: CPT

## 2025-07-02 PROCEDURE — 88342 IMHCHEM/IMCYTCHM 1ST ANTB: CPT

## 2025-07-02 PROCEDURE — 83521 IG LIGHT CHAINS FREE EACH: CPT

## 2025-07-02 PROCEDURE — 76942 ECHO GUIDE FOR BIOPSY: CPT

## 2025-07-02 PROCEDURE — 88173 CYTOPATH EVAL FNA REPORT: CPT

## 2025-07-02 PROCEDURE — 88333 PATH CONSLTJ SURG CYTO XM 1: CPT

## 2025-07-02 PROCEDURE — 71250 CT THORAX DX C-: CPT

## 2025-07-02 PROCEDURE — 85025 COMPLETE CBC W/AUTO DIFF WBC: CPT

## 2025-07-02 PROCEDURE — 80061 LIPID PANEL: CPT

## 2025-07-02 PROCEDURE — 82330 ASSAY OF CALCIUM: CPT

## 2025-07-02 PROCEDURE — 88112 CYTOPATH CELL ENHANCE TECH: CPT

## 2025-07-02 PROCEDURE — 80164 ASSAY DIPROPYLACETIC ACD TOT: CPT

## 2025-07-02 PROCEDURE — 84145 PROCALCITONIN (PCT): CPT

## 2025-07-02 PROCEDURE — 88305 TISSUE EXAM BY PATHOLOGIST: CPT

## 2025-07-02 PROCEDURE — 84295 ASSAY OF SERUM SODIUM: CPT

## 2025-07-02 PROCEDURE — 85014 HEMATOCRIT: CPT

## 2025-07-02 PROCEDURE — 80048 BASIC METABOLIC PNL TOTAL CA: CPT

## 2025-07-02 PROCEDURE — 97110 THERAPEUTIC EXERCISES: CPT

## 2025-07-02 PROCEDURE — 70450 CT HEAD/BRAIN W/O DYE: CPT

## 2025-07-02 PROCEDURE — 88313 SPECIAL STAINS GROUP 2: CPT

## 2025-07-02 PROCEDURE — 36600 WITHDRAWAL OF ARTERIAL BLOOD: CPT

## 2025-07-02 PROCEDURE — 82947 ASSAY GLUCOSE BLOOD QUANT: CPT

## 2025-07-02 PROCEDURE — 86335 IMMUNFIX E-PHORSIS/URINE/CSF: CPT

## 2025-07-02 PROCEDURE — 93306 TTE W/DOPPLER COMPLETE: CPT

## 2025-07-02 PROCEDURE — 97161 PT EVAL LOW COMPLEX 20 MIN: CPT

## 2025-07-02 PROCEDURE — 84165 PROTEIN E-PHORESIS SERUM: CPT

## 2025-07-02 PROCEDURE — 87070 CULTURE OTHR SPECIMN AEROBIC: CPT

## 2025-07-02 PROCEDURE — 85610 PROTHROMBIN TIME: CPT

## 2025-07-02 PROCEDURE — 38505 NEEDLE BIOPSY LYMPH NODES: CPT

## 2025-07-02 PROCEDURE — 83036 HEMOGLOBIN GLYCOSYLATED A1C: CPT

## 2025-07-02 PROCEDURE — 80053 COMPREHEN METABOLIC PANEL: CPT

## 2025-07-02 PROCEDURE — 83986 ASSAY PH BODY FLUID NOS: CPT

## 2025-07-02 PROCEDURE — 87015 SPECIMEN INFECT AGNT CONCNTJ: CPT

## 2025-07-02 PROCEDURE — 99232 SBSQ HOSP IP/OBS MODERATE 35: CPT

## 2025-07-02 PROCEDURE — 97530 THERAPEUTIC ACTIVITIES: CPT

## 2025-07-02 PROCEDURE — 82306 VITAMIN D 25 HYDROXY: CPT

## 2025-07-02 PROCEDURE — 84155 ASSAY OF PROTEIN SERUM: CPT

## 2025-07-02 PROCEDURE — 36415 COLL VENOUS BLD VENIPUNCTURE: CPT

## 2025-07-02 PROCEDURE — 78830 RP LOCLZJ TUM SPECT W/CT 1: CPT

## 2025-07-02 PROCEDURE — 97112 NEUROMUSCULAR REEDUCATION: CPT

## 2025-07-02 PROCEDURE — C8924: CPT

## 2025-07-02 PROCEDURE — A9561: CPT

## 2025-07-02 PROCEDURE — 86850 RBC ANTIBODY SCREEN: CPT

## 2025-07-02 PROCEDURE — 83735 ASSAY OF MAGNESIUM: CPT

## 2025-07-02 PROCEDURE — 71045 X-RAY EXAM CHEST 1 VIEW: CPT

## 2025-07-02 PROCEDURE — 84157 ASSAY OF PROTEIN OTHER: CPT

## 2025-07-02 PROCEDURE — 82140 ASSAY OF AMMONIA: CPT

## 2025-07-02 PROCEDURE — 94660 CPAP INITIATION&MGMT: CPT

## 2025-07-02 PROCEDURE — 86334 IMMUNOFIX E-PHORESIS SERUM: CPT

## 2025-07-02 PROCEDURE — 71046 X-RAY EXAM CHEST 2 VIEWS: CPT

## 2025-07-02 PROCEDURE — 88360 TUMOR IMMUNOHISTOCHEM/MANUAL: CPT

## 2025-07-02 PROCEDURE — 83880 ASSAY OF NATRIURETIC PEPTIDE: CPT

## 2025-07-02 PROCEDURE — 84156 ASSAY OF PROTEIN URINE: CPT

## 2025-07-02 PROCEDURE — 95816 EEG AWAKE AND DROWSY: CPT

## 2025-07-02 PROCEDURE — A9538: CPT

## 2025-07-02 PROCEDURE — 84166 PROTEIN E-PHORESIS/URINE/CSF: CPT

## 2025-07-02 PROCEDURE — 85730 THROMBOPLASTIN TIME PARTIAL: CPT

## 2025-07-02 PROCEDURE — 82435 ASSAY OF BLOOD CHLORIDE: CPT

## 2025-07-02 PROCEDURE — 86900 BLOOD TYPING SEROLOGIC ABO: CPT

## 2025-07-02 PROCEDURE — 83615 LACTATE (LD) (LDH) ENZYME: CPT

## 2025-07-02 RX ORDER — METOPROLOL SUCCINATE 50 MG/1
75 TABLET, EXTENDED RELEASE ORAL DAILY
Refills: 0 | Status: DISCONTINUED | OUTPATIENT
Start: 2025-07-03 | End: 2025-07-07

## 2025-07-02 RX ORDER — METOPROLOL SUCCINATE 50 MG/1
25 TABLET, EXTENDED RELEASE ORAL ONCE
Refills: 0 | Status: COMPLETED | OUTPATIENT
Start: 2025-07-02 | End: 2025-07-02

## 2025-07-02 RX ADMIN — Medication 500 MILLIGRAM(S): at 07:45

## 2025-07-02 RX ADMIN — Medication 3 MILLIGRAM(S): at 21:49

## 2025-07-02 RX ADMIN — SACUBITRIL AND VALSARTAN 1 TABLET(S): 6; 6 PELLET ORAL at 05:25

## 2025-07-02 RX ADMIN — Medication 40 MILLIGRAM(S): at 05:25

## 2025-07-02 RX ADMIN — Medication 81 MILLIGRAM(S): at 11:53

## 2025-07-02 RX ADMIN — Medication 500 MILLIGRAM(S): at 17:39

## 2025-07-02 RX ADMIN — FUROSEMIDE 40 MILLIGRAM(S): 10 INJECTION INTRAMUSCULAR; INTRAVENOUS at 05:25

## 2025-07-02 RX ADMIN — METOPROLOL SUCCINATE 25 MILLIGRAM(S): 50 TABLET, EXTENDED RELEASE ORAL at 10:28

## 2025-07-02 RX ADMIN — SACUBITRIL AND VALSARTAN 1 TABLET(S): 6; 6 PELLET ORAL at 17:39

## 2025-07-02 RX ADMIN — METOPROLOL SUCCINATE 25 MILLIGRAM(S): 50 TABLET, EXTENDED RELEASE ORAL at 18:24

## 2025-07-02 RX ADMIN — Medication 25 MILLIGRAM(S): at 05:25

## 2025-07-02 RX ADMIN — METOPROLOL SUCCINATE 25 MILLIGRAM(S): 50 TABLET, EXTENDED RELEASE ORAL at 05:25

## 2025-07-02 RX ADMIN — ATORVASTATIN CALCIUM 80 MILLIGRAM(S): 80 TABLET, FILM COATED ORAL at 21:46

## 2025-07-02 RX ADMIN — Medication 2 TABLET(S): at 21:46

## 2025-07-02 NOTE — PROGRESS NOTE ADULT - SUBJECTIVE AND OBJECTIVE BOX
Andre Reyes, M.D.  Office: [phone]  Available thru [employer] Teams     Patient is a 69y old  Female who presents with a chief complaint of Acute decompensated heart failure (01 Jul 2025 20:03)          SUBJECTIVE / OVERNIGHT EVENTS:    No acute overnight events.        MEDICATIONS  (STANDING):  aspirin  chewable 81 milliGRAM(s) Oral daily  atorvastatin 80 milliGRAM(s) Oral at bedtime  divalproex  milliGRAM(s) Oral two times a day  furosemide    Tablet 40 milliGRAM(s) Oral daily  insulin lispro (ADMELOG) corrective regimen sliding scale   SubCutaneous three times a day before meals  insulin lispro (ADMELOG) corrective regimen sliding scale   SubCutaneous at bedtime  melatonin 3 milliGRAM(s) Oral at bedtime  metoprolol succinate ER 25 milliGRAM(s) Oral daily  metoprolol tartrate Injectable 5 milliGRAM(s) IV Push once  pantoprazole    Tablet 40 milliGRAM(s) Oral before breakfast  polyethylene glycol 3350 17 Gram(s) Oral at bedtime  sacubitril 24 mG/valsartan 26 mG 1 Tablet(s) Oral two times a day  senna 2 Tablet(s) Oral at bedtime  spironolactone 25 milliGRAM(s) Oral daily    MEDICATIONS  (PRN):  acetaminophen     Tablet .. 650 milliGRAM(s) Oral every 6 hours PRN Temp greater or equal to 38C (100.4F), Mild Pain (1 - 3)  aluminum hydroxide/magnesium hydroxide/simethicone Suspension 30 milliLiter(s) Oral every 4 hours PRN Dyspepsia  ondansetron Injectable 4 milliGRAM(s) IV Push every 8 hours PRN Nausea and/or Vomiting          T(C): 36.8 (07-02 @ 10:56), Max: 37.3 (07-01 @ 16:26)   HR: 82   BP: 139/75   RR: 18   SpO2: 99%    PHYSICAL EXAM:    CONSTITUTIONAL: NAD, well-developed, well-groomed  EYES: PERRLA; conjunctiva and sclera clear  ENMT: Moist oral mucosa, no pharyngeal injection or exudates; normal dentition  RESPIRATORY: Normal respiratory effort; lungs are clear to auscultation bilaterally  CARDIOVASCULAR: Regular rate and rhythm, normal S1 and S2, no murmur/rub/gallop; No lower extremity edema; Peripheral pulses are 2+ bilaterally  ABDOMEN: Nontender to palpation, normoactive bowel sounds, no rebound/guarding; No hepatosplenomegaly  MUSCULOSKELETAL:  no clubbing or cyanosis of digits; no joint swelling or tenderness to palpation  PSYCH: A+O to person, place, and time; affect appropriate  NEUROLOGY: CN 2-12 are intact and symmetric; no gross sensory deficits       LABS:              CAPILLARY BLOOD GLUCOSE      POCT Blood Glucose.: 114 mg/dL (02 Jul 2025 11:24)  POCT Blood Glucose.: 91 mg/dL (02 Jul 2025 07:21)  POCT Blood Glucose.: 97 mg/dL (01 Jul 2025 21:39)  POCT Blood Glucose.: 158 mg/dL (01 Jul 2025 17:29)      RADIOLOGY & ADDITIONAL TESTS:    Imaging Personally Reviewed:  Consultant(s) Notes Reviewed:    Care Discussed with Consultants/Other Providers:   Andre Reyes, M.D.  Office: [phone]  Available thru [employer] Teams     Patient is a 69y old  Female who presents with a chief complaint of Acute decompensated heart failure (01 Jul 2025 20:03)          SUBJECTIVE / OVERNIGHT EVENTS:    No acute overnight events.  feels better.   no new complaints        MEDICATIONS  (STANDING):  aspirin  chewable 81 milliGRAM(s) Oral daily  atorvastatin 80 milliGRAM(s) Oral at bedtime  divalproex  milliGRAM(s) Oral two times a day  furosemide    Tablet 40 milliGRAM(s) Oral daily  insulin lispro (ADMELOG) corrective regimen sliding scale   SubCutaneous three times a day before meals  insulin lispro (ADMELOG) corrective regimen sliding scale   SubCutaneous at bedtime  melatonin 3 milliGRAM(s) Oral at bedtime  metoprolol succinate ER 25 milliGRAM(s) Oral daily  metoprolol tartrate Injectable 5 milliGRAM(s) IV Push once  pantoprazole    Tablet 40 milliGRAM(s) Oral before breakfast  polyethylene glycol 3350 17 Gram(s) Oral at bedtime  sacubitril 24 mG/valsartan 26 mG 1 Tablet(s) Oral two times a day  senna 2 Tablet(s) Oral at bedtime  spironolactone 25 milliGRAM(s) Oral daily    MEDICATIONS  (PRN):  acetaminophen     Tablet .. 650 milliGRAM(s) Oral every 6 hours PRN Temp greater or equal to 38C (100.4F), Mild Pain (1 - 3)  aluminum hydroxide/magnesium hydroxide/simethicone Suspension 30 milliLiter(s) Oral every 4 hours PRN Dyspepsia  ondansetron Injectable 4 milliGRAM(s) IV Push every 8 hours PRN Nausea and/or Vomiting          T(C): 36.8 (07-02 @ 10:56), Max: 37.3 (07-01 @ 16:26)   HR: 82   BP: 139/75   RR: 18   SpO2: 99%    PHYSICAL EXAM:    CONSTITUTIONAL: NAD, well-developed, well-groomed  EYES: PERRLA; conjunctiva and sclera clear  ENMT: Moist oral mucosa, no pharyngeal injection or exudates; normal dentition  RESPIRATORY: Normal respiratory effort; lungs are clear to auscultation bilaterally  CARDIOVASCULAR: Regular rate and rhythm, normal S1 and S2, no murmur/rub/gallop; No lower extremity edema; Peripheral pulses are 2+ bilaterally  ABDOMEN: Nontender to palpation, normoactive bowel sounds, no rebound/guarding; No hepatosplenomegaly  MUSCULOSKELETAL:  no clubbing or cyanosis of digits; no joint swelling or tenderness to palpation  PSYCH: A+O to person, place, and time; affect appropriate  NEUROLOGY: CN 2-12 are intact and symmetric; no gross sensory deficits       LABS:              CAPILLARY BLOOD GLUCOSE      POCT Blood Glucose.: 114 mg/dL (02 Jul 2025 11:24)  POCT Blood Glucose.: 91 mg/dL (02 Jul 2025 07:21)  POCT Blood Glucose.: 97 mg/dL (01 Jul 2025 21:39)  POCT Blood Glucose.: 158 mg/dL (01 Jul 2025 17:29)      RADIOLOGY & ADDITIONAL TESTS:    Imaging Personally Reviewed:  Consultant(s) Notes Reviewed:    Care Discussed with Consultants/Other Providers:

## 2025-07-02 NOTE — PROGRESS NOTE ADULT - PROBLEM SELECTOR PLAN 3
MIKAELA resolved.   CKD3b  Renal function much improved  Creatinine Trend: 1.09<--, 1.09<--, 1.16<--, 1.14<--, 1.27<--, 1.48<--

## 2025-07-02 NOTE — CHART NOTE - NSCHARTNOTEFT_GEN_A_CORE
Pt awaiting CT coronaries - HR remains too high for optimal test results. Pt received toprol 25mg at 5am and 1030am today, HR remains 75-80 bpm, /71. Discussed with Dr. Reyes, another 25mg toprol now then increased Toprol to 75mg daily. Follow up in am with CT coronary staff. Will follow

## 2025-07-03 DIAGNOSIS — I26.99 OTHER PULMONARY EMBOLISM WITHOUT ACUTE COR PULMONALE: ICD-10-CM

## 2025-07-03 LAB
GLUCOSE BLDC GLUCOMTR-MCNC: 110 MG/DL — HIGH (ref 70–99)
GLUCOSE BLDC GLUCOMTR-MCNC: 110 MG/DL — HIGH (ref 70–99)
GLUCOSE BLDC GLUCOMTR-MCNC: 84 MG/DL — SIGNIFICANT CHANGE UP (ref 70–99)
GLUCOSE BLDC GLUCOMTR-MCNC: 99 MG/DL — SIGNIFICANT CHANGE UP (ref 70–99)

## 2025-07-03 PROCEDURE — 84100 ASSAY OF PHOSPHORUS: CPT

## 2025-07-03 PROCEDURE — 74176 CT ABD & PELVIS W/O CONTRAST: CPT

## 2025-07-03 PROCEDURE — 83615 LACTATE (LD) (LDH) ENZYME: CPT

## 2025-07-03 PROCEDURE — 99233 SBSQ HOSP IP/OBS HIGH 50: CPT

## 2025-07-03 PROCEDURE — 84156 ASSAY OF PROTEIN URINE: CPT

## 2025-07-03 PROCEDURE — 88173 CYTOPATH EVAL FNA REPORT: CPT

## 2025-07-03 PROCEDURE — 75574 CT ANGIO HRT W/3D IMAGE: CPT | Mod: 26

## 2025-07-03 PROCEDURE — 85730 THROMBOPLASTIN TIME PARTIAL: CPT

## 2025-07-03 PROCEDURE — 85018 HEMOGLOBIN: CPT

## 2025-07-03 PROCEDURE — 82803 BLOOD GASES ANY COMBINATION: CPT

## 2025-07-03 PROCEDURE — 76942 ECHO GUIDE FOR BIOPSY: CPT

## 2025-07-03 PROCEDURE — 85610 PROTHROMBIN TIME: CPT

## 2025-07-03 PROCEDURE — 93005 ELECTROCARDIOGRAM TRACING: CPT

## 2025-07-03 PROCEDURE — 71250 CT THORAX DX C-: CPT

## 2025-07-03 PROCEDURE — 82945 GLUCOSE OTHER FLUID: CPT

## 2025-07-03 PROCEDURE — 71046 X-RAY EXAM CHEST 2 VIEWS: CPT

## 2025-07-03 PROCEDURE — 80164 ASSAY DIPROPYLACETIC ACD TOT: CPT

## 2025-07-03 PROCEDURE — 88305 TISSUE EXAM BY PATHOLOGIST: CPT

## 2025-07-03 PROCEDURE — 88185 FLOWCYTOMETRY/TC ADD-ON: CPT

## 2025-07-03 PROCEDURE — 87075 CULTR BACTERIA EXCEPT BLOOD: CPT

## 2025-07-03 PROCEDURE — 83521 IG LIGHT CHAINS FREE EACH: CPT

## 2025-07-03 PROCEDURE — 89051 BODY FLUID CELL COUNT: CPT

## 2025-07-03 PROCEDURE — 88341 IMHCHEM/IMCYTCHM EA ADD ANTB: CPT

## 2025-07-03 PROCEDURE — 88360 TUMOR IMMUNOHISTOCHEM/MANUAL: CPT

## 2025-07-03 PROCEDURE — 94640 AIRWAY INHALATION TREATMENT: CPT

## 2025-07-03 PROCEDURE — C8924: CPT

## 2025-07-03 PROCEDURE — 36415 COLL VENOUS BLD VENIPUNCTURE: CPT

## 2025-07-03 PROCEDURE — A9585: CPT

## 2025-07-03 PROCEDURE — 87102 FUNGUS ISOLATION CULTURE: CPT

## 2025-07-03 PROCEDURE — 80048 BASIC METABOLIC PNL TOTAL CA: CPT

## 2025-07-03 PROCEDURE — 83036 HEMOGLOBIN GLYCOSYLATED A1C: CPT

## 2025-07-03 PROCEDURE — 85025 COMPLETE CBC W/AUTO DIFF WBC: CPT

## 2025-07-03 PROCEDURE — 88333 PATH CONSLTJ SURG CYTO XM 1: CPT

## 2025-07-03 PROCEDURE — 93306 TTE W/DOPPLER COMPLETE: CPT

## 2025-07-03 PROCEDURE — 88184 FLOWCYTOMETRY/ TC 1 MARKER: CPT

## 2025-07-03 PROCEDURE — 86900 BLOOD TYPING SEROLOGIC ABO: CPT

## 2025-07-03 PROCEDURE — 85520 HEPARIN ASSAY: CPT

## 2025-07-03 PROCEDURE — 86850 RBC ANTIBODY SCREEN: CPT

## 2025-07-03 PROCEDURE — 97161 PT EVAL LOW COMPLEX 20 MIN: CPT

## 2025-07-03 PROCEDURE — A9561: CPT

## 2025-07-03 PROCEDURE — 82330 ASSAY OF CALCIUM: CPT

## 2025-07-03 PROCEDURE — 84165 PROTEIN E-PHORESIS SERUM: CPT

## 2025-07-03 PROCEDURE — 70551 MRI BRAIN STEM W/O DYE: CPT

## 2025-07-03 PROCEDURE — 82140 ASSAY OF AMMONIA: CPT

## 2025-07-03 PROCEDURE — 84132 ASSAY OF SERUM POTASSIUM: CPT

## 2025-07-03 PROCEDURE — 82306 VITAMIN D 25 HYDROXY: CPT

## 2025-07-03 PROCEDURE — 83986 ASSAY PH BODY FLUID NOS: CPT

## 2025-07-03 PROCEDURE — 94660 CPAP INITIATION&MGMT: CPT

## 2025-07-03 PROCEDURE — 38505 NEEDLE BIOPSY LYMPH NODES: CPT

## 2025-07-03 PROCEDURE — 86334 IMMUNOFIX E-PHORESIS SERUM: CPT

## 2025-07-03 PROCEDURE — 95816 EEG AWAKE AND DROWSY: CPT

## 2025-07-03 PROCEDURE — 36600 WITHDRAWAL OF ARTERIAL BLOOD: CPT

## 2025-07-03 PROCEDURE — 83605 ASSAY OF LACTIC ACID: CPT

## 2025-07-03 PROCEDURE — 71045 X-RAY EXAM CHEST 1 VIEW: CPT

## 2025-07-03 PROCEDURE — 78830 RP LOCLZJ TUM SPECT W/CT 1: CPT

## 2025-07-03 PROCEDURE — 84145 PROCALCITONIN (PCT): CPT

## 2025-07-03 PROCEDURE — 97530 THERAPEUTIC ACTIVITIES: CPT

## 2025-07-03 PROCEDURE — 88342 IMHCHEM/IMCYTCHM 1ST ANTB: CPT

## 2025-07-03 PROCEDURE — 87205 SMEAR GRAM STAIN: CPT

## 2025-07-03 PROCEDURE — 84443 ASSAY THYROID STIM HORMONE: CPT

## 2025-07-03 PROCEDURE — 88313 SPECIAL STAINS GROUP 2: CPT

## 2025-07-03 PROCEDURE — 82962 GLUCOSE BLOOD TEST: CPT

## 2025-07-03 PROCEDURE — 84166 PROTEIN E-PHORESIS/URINE/CSF: CPT

## 2025-07-03 PROCEDURE — 84157 ASSAY OF PROTEIN OTHER: CPT

## 2025-07-03 PROCEDURE — 84295 ASSAY OF SERUM SODIUM: CPT

## 2025-07-03 PROCEDURE — 83735 ASSAY OF MAGNESIUM: CPT

## 2025-07-03 PROCEDURE — 86335 IMMUNFIX E-PHORSIS/URINE/CSF: CPT

## 2025-07-03 PROCEDURE — 75561 CARDIAC MRI FOR MORPH W/DYE: CPT

## 2025-07-03 PROCEDURE — A9538: CPT

## 2025-07-03 PROCEDURE — 80061 LIPID PANEL: CPT

## 2025-07-03 PROCEDURE — 82435 ASSAY OF BLOOD CHLORIDE: CPT

## 2025-07-03 PROCEDURE — 85014 HEMATOCRIT: CPT

## 2025-07-03 PROCEDURE — 86901 BLOOD TYPING SEROLOGIC RH(D): CPT

## 2025-07-03 PROCEDURE — 88112 CYTOPATH CELL ENHANCE TECH: CPT

## 2025-07-03 PROCEDURE — 85027 COMPLETE CBC AUTOMATED: CPT

## 2025-07-03 PROCEDURE — 70450 CT HEAD/BRAIN W/O DYE: CPT

## 2025-07-03 PROCEDURE — 82947 ASSAY GLUCOSE BLOOD QUANT: CPT

## 2025-07-03 PROCEDURE — 97112 NEUROMUSCULAR REEDUCATION: CPT

## 2025-07-03 PROCEDURE — 87070 CULTURE OTHR SPECIMN AEROBIC: CPT

## 2025-07-03 PROCEDURE — 97110 THERAPEUTIC EXERCISES: CPT

## 2025-07-03 PROCEDURE — 80053 COMPREHEN METABOLIC PANEL: CPT

## 2025-07-03 PROCEDURE — 84155 ASSAY OF PROTEIN SERUM: CPT

## 2025-07-03 PROCEDURE — 0241U: CPT

## 2025-07-03 PROCEDURE — 83880 ASSAY OF NATRIURETIC PEPTIDE: CPT

## 2025-07-03 PROCEDURE — 82042 OTHER SOURCE ALBUMIN QUAN EA: CPT

## 2025-07-03 PROCEDURE — 87015 SPECIMEN INFECT AGNT CONCNTJ: CPT

## 2025-07-03 PROCEDURE — 75574 CT ANGIO HRT W/3D IMAGE: CPT

## 2025-07-03 RX ORDER — METOPROLOL SUCCINATE 50 MG/1
50 TABLET, EXTENDED RELEASE ORAL ONCE
Refills: 0 | Status: COMPLETED | OUTPATIENT
Start: 2025-07-03 | End: 2025-07-03

## 2025-07-03 RX ORDER — APIXABAN 5 MG/1
10 TABLET, FILM COATED ORAL EVERY 12 HOURS
Refills: 0 | Status: DISCONTINUED | OUTPATIENT
Start: 2025-07-03 | End: 2025-07-07

## 2025-07-03 RX ORDER — FUROSEMIDE 10 MG/ML
40 INJECTION INTRAMUSCULAR; INTRAVENOUS
Refills: 0 | Status: DISCONTINUED | OUTPATIENT
Start: 2025-07-03 | End: 2025-07-07

## 2025-07-03 RX ADMIN — APIXABAN 10 MILLIGRAM(S): 5 TABLET, FILM COATED ORAL at 17:21

## 2025-07-03 RX ADMIN — SACUBITRIL AND VALSARTAN 1 TABLET(S): 6; 6 PELLET ORAL at 17:21

## 2025-07-03 RX ADMIN — METOPROLOL SUCCINATE 50 MILLIGRAM(S): 50 TABLET, EXTENDED RELEASE ORAL at 09:51

## 2025-07-03 RX ADMIN — FUROSEMIDE 40 MILLIGRAM(S): 10 INJECTION INTRAMUSCULAR; INTRAVENOUS at 06:06

## 2025-07-03 RX ADMIN — Medication 500 MILLIGRAM(S): at 17:22

## 2025-07-03 RX ADMIN — Medication 25 MILLIGRAM(S): at 06:06

## 2025-07-03 RX ADMIN — Medication 81 MILLIGRAM(S): at 11:16

## 2025-07-03 RX ADMIN — Medication 2 TABLET(S): at 21:07

## 2025-07-03 RX ADMIN — Medication 3 MILLIGRAM(S): at 21:07

## 2025-07-03 RX ADMIN — FUROSEMIDE 40 MILLIGRAM(S): 10 INJECTION INTRAMUSCULAR; INTRAVENOUS at 13:19

## 2025-07-03 RX ADMIN — Medication 40 MILLIGRAM(S): at 06:05

## 2025-07-03 RX ADMIN — SACUBITRIL AND VALSARTAN 1 TABLET(S): 6; 6 PELLET ORAL at 06:04

## 2025-07-03 RX ADMIN — Medication 500 MILLIGRAM(S): at 06:05

## 2025-07-03 RX ADMIN — ATORVASTATIN CALCIUM 80 MILLIGRAM(S): 80 TABLET, FILM COATED ORAL at 21:07

## 2025-07-03 RX ADMIN — METOPROLOL SUCCINATE 75 MILLIGRAM(S): 50 TABLET, EXTENDED RELEASE ORAL at 06:06

## 2025-07-03 NOTE — PROGRESS NOTE ADULT - SUBJECTIVE AND OBJECTIVE BOX
Andre Reyes, M.D.  Office: [phone]  Available thru [employer] Teams     Patient is a 69y old  Female who presents with a chief complaint of Acute decompensated heart failure (02 Jul 2025 12:20)          SUBJECTIVE / OVERNIGHT EVENTS:    No acute overnight events.        MEDICATIONS  (STANDING):  aspirin  chewable 81 milliGRAM(s) Oral daily  atorvastatin 80 milliGRAM(s) Oral at bedtime  divalproex  milliGRAM(s) Oral two times a day  furosemide    Tablet 40 milliGRAM(s) Oral daily  insulin lispro (ADMELOG) corrective regimen sliding scale   SubCutaneous three times a day before meals  insulin lispro (ADMELOG) corrective regimen sliding scale   SubCutaneous at bedtime  melatonin 3 milliGRAM(s) Oral at bedtime  metoprolol succinate ER 75 milliGRAM(s) Oral daily  pantoprazole    Tablet 40 milliGRAM(s) Oral before breakfast  polyethylene glycol 3350 17 Gram(s) Oral at bedtime  sacubitril 24 mG/valsartan 26 mG 1 Tablet(s) Oral two times a day  senna 2 Tablet(s) Oral at bedtime  spironolactone 25 milliGRAM(s) Oral daily    MEDICATIONS  (PRN):  acetaminophen     Tablet .. 650 milliGRAM(s) Oral every 6 hours PRN Temp greater or equal to 38C (100.4F), Mild Pain (1 - 3)  aluminum hydroxide/magnesium hydroxide/simethicone Suspension 30 milliLiter(s) Oral every 4 hours PRN Dyspepsia  ondansetron Injectable 4 milliGRAM(s) IV Push every 8 hours PRN Nausea and/or Vomiting          T(C): 36.9 (07-03 @ 04:29), Max: 37.1 (07-02 @ 20:13)   HR: 77   BP: 122/77   RR: 18   SpO2: 99%    PHYSICAL EXAM:    CONSTITUTIONAL: NAD, well-developed, well-groomed  EYES: PERRLA; conjunctiva and sclera clear  ENMT: Moist oral mucosa, no pharyngeal injection or exudates; normal dentition  RESPIRATORY: Normal respiratory effort; lungs are clear to auscultation bilaterally  CARDIOVASCULAR: Regular rate and rhythm, normal S1 and S2, no murmur/rub/gallop; No lower extremity edema; Peripheral pulses are 2+ bilaterally  ABDOMEN: Nontender to palpation, normoactive bowel sounds, no rebound/guarding; No hepatosplenomegaly  MUSCULOSKELETAL:  no clubbing or cyanosis of digits; no joint swelling or tenderness to palpation  PSYCH: A+O to person, place, and time; affect appropriate  NEUROLOGY: CN 2-12 are intact and symmetric; no gross sensory deficits       LABS:              CAPILLARY BLOOD GLUCOSE      POCT Blood Glucose.: 99 mg/dL (03 Jul 2025 07:28)  POCT Blood Glucose.: 151 mg/dL (02 Jul 2025 21:09)  POCT Blood Glucose.: 131 mg/dL (02 Jul 2025 17:17)  POCT Blood Glucose.: 114 mg/dL (02 Jul 2025 11:24)      RADIOLOGY & ADDITIONAL TESTS:    Imaging Personally Reviewed:  Consultant(s) Notes Reviewed:    Care Discussed with Consultants/Other Providers:   Andre Reyes, M.D.  Office: [phone]  Available thru [employer]     Patient is a 69y old  Female who presents with a chief complaint of Acute decompensated heart failure (02 Jul 2025 12:20)          SUBJECTIVE / OVERNIGHT EVENTS:    No acute overnight events.  No complaints  just feels a little depressed given her current medical problems.      MEDICATIONS  (STANDING):  aspirin  chewable 81 milliGRAM(s) Oral daily  atorvastatin 80 milliGRAM(s) Oral at bedtime  divalproex  milliGRAM(s) Oral two times a day  furosemide    Tablet 40 milliGRAM(s) Oral daily  insulin lispro (ADMELOG) corrective regimen sliding scale   SubCutaneous three times a day before meals  insulin lispro (ADMELOG) corrective regimen sliding scale   SubCutaneous at bedtime  melatonin 3 milliGRAM(s) Oral at bedtime  metoprolol succinate ER 75 milliGRAM(s) Oral daily  pantoprazole    Tablet 40 milliGRAM(s) Oral before breakfast  polyethylene glycol 3350 17 Gram(s) Oral at bedtime  sacubitril 24 mG/valsartan 26 mG 1 Tablet(s) Oral two times a day  senna 2 Tablet(s) Oral at bedtime  spironolactone 25 milliGRAM(s) Oral daily    MEDICATIONS  (PRN):  acetaminophen     Tablet .. 650 milliGRAM(s) Oral every 6 hours PRN Temp greater or equal to 38C (100.4F), Mild Pain (1 - 3)  aluminum hydroxide/magnesium hydroxide/simethicone Suspension 30 milliLiter(s) Oral every 4 hours PRN Dyspepsia  ondansetron Injectable 4 milliGRAM(s) IV Push every 8 hours PRN Nausea and/or Vomiting          T(C): 36.9 (07-03 @ 04:29), Max: 37.1 (07-02 @ 20:13)   HR: 77   BP: 122/77   RR: 18   SpO2: 99%    PHYSICAL EXAM:    CONSTITUTIONAL: NAD, well-developed, well-groomed  EYES: PERRLA; conjunctiva and sclera clear  ENMT: Moist oral mucosa, no pharyngeal injection or exudates; normal dentition  RESPIRATORY: Normal respiratory effort; lungs are clear to auscultation bilaterally; decreased breath sounds at the bases  CARDIOVASCULAR: Regular rate and rhythm, normal S1 and S2, no murmur/rub/gallop; 1+ lower extremity edema; Peripheral pulses are 2+ bilaterally  ABDOMEN: Nontender to palpation, normoactive bowel sounds, no rebound/guarding; No hepatosplenomegaly  MUSCULOSKELETAL:  no clubbing or cyanosis of digits; no joint swelling or tenderness to palpation  PSYCH: A+O to person, place, and time; affect appropriate  NEUROLOGY: CN 2-12 are intact and symmetric; no gross sensory deficits       LABS:              CAPILLARY BLOOD GLUCOSE      POCT Blood Glucose.: 99 mg/dL (03 Jul 2025 07:28)  POCT Blood Glucose.: 151 mg/dL (02 Jul 2025 21:09)  POCT Blood Glucose.: 131 mg/dL (02 Jul 2025 17:17)  POCT Blood Glucose.: 114 mg/dL (02 Jul 2025 11:24)      RADIOLOGY & ADDITIONAL TESTS:    Imaging Personally Reviewed:  Consultant(s) Notes Reviewed:    Care Discussed with Consultants/Other Providers:

## 2025-07-03 NOTE — PROGRESS NOTE ADULT - PROBLEM SELECTOR PLAN 3
pt now requiring a little oxygen again today  - BIPAP nocturnal while inpatient then CPAP at home or at rehab upon discharge  - will increase lasix to 40mg BID given persistent pleural effusions and now requiring supplemental oxygen again

## 2025-07-03 NOTE — PROGRESS NOTE ADULT - PROBLEM SELECTOR PLAN 1
incidental imaging findings on CT angio --> bilateral pulmonary emboli involving right upper lobe lobar/segmental pulmonary arterial tree, right middle lobe lobar/segmental/subsegmental pulmonary arterial tree, right lower lobe segmental/subsegmental pulmonary arterial tree and left upper lobe subsegmental pulmonary arterial tree.    - started on eliquis for therapeutic anticoagulation

## 2025-07-03 NOTE — PROGRESS NOTE ADULT - PROBLEM SELECTOR PLAN 2
On 6/14/25, TTE with EF 30-35% with wall motion abnormalities. Diuresed with Bumex drip earlier in hospitalization.   - GDMT: +BB. + ARNI, +MRA, SGLT2i upon discharge.  - s/p cMRI and Nuclear Scan - negative for infiltrative diease  - pt found to have a new malignancy.   - s/p CT coronaries -->Coronary CTA: Left coronary artery dominance. No significant stenosis of LAD or LCx. Minimal to mild category disease noted second to calcified and noncalcified plaque. not concerning for ischemic  cardiomyopathy    - will increase lasix 40mg bid given persist pleural effusions seen on CT coronaries today

## 2025-07-04 LAB
GLUCOSE BLDC GLUCOMTR-MCNC: 124 MG/DL — HIGH (ref 70–99)
GLUCOSE BLDC GLUCOMTR-MCNC: 140 MG/DL — HIGH (ref 70–99)
GLUCOSE BLDC GLUCOMTR-MCNC: 94 MG/DL — SIGNIFICANT CHANGE UP (ref 70–99)
GLUCOSE BLDC GLUCOMTR-MCNC: 95 MG/DL — SIGNIFICANT CHANGE UP (ref 70–99)

## 2025-07-04 PROCEDURE — 99232 SBSQ HOSP IP/OBS MODERATE 35: CPT

## 2025-07-04 RX ADMIN — Medication 40 MILLIGRAM(S): at 05:10

## 2025-07-04 RX ADMIN — Medication 3 MILLIGRAM(S): at 21:16

## 2025-07-04 RX ADMIN — Medication 500 MILLIGRAM(S): at 05:11

## 2025-07-04 RX ADMIN — Medication 500 MILLIGRAM(S): at 17:16

## 2025-07-04 RX ADMIN — FUROSEMIDE 40 MILLIGRAM(S): 10 INJECTION INTRAMUSCULAR; INTRAVENOUS at 13:18

## 2025-07-04 RX ADMIN — SACUBITRIL AND VALSARTAN 1 TABLET(S): 6; 6 PELLET ORAL at 05:10

## 2025-07-04 RX ADMIN — SACUBITRIL AND VALSARTAN 1 TABLET(S): 6; 6 PELLET ORAL at 17:16

## 2025-07-04 RX ADMIN — Medication 25 MILLIGRAM(S): at 05:10

## 2025-07-04 RX ADMIN — APIXABAN 10 MILLIGRAM(S): 5 TABLET, FILM COATED ORAL at 17:15

## 2025-07-04 RX ADMIN — ATORVASTATIN CALCIUM 80 MILLIGRAM(S): 80 TABLET, FILM COATED ORAL at 21:16

## 2025-07-04 RX ADMIN — APIXABAN 10 MILLIGRAM(S): 5 TABLET, FILM COATED ORAL at 05:13

## 2025-07-04 RX ADMIN — Medication 2 TABLET(S): at 21:16

## 2025-07-04 RX ADMIN — FUROSEMIDE 40 MILLIGRAM(S): 10 INJECTION INTRAMUSCULAR; INTRAVENOUS at 05:10

## 2025-07-04 RX ADMIN — METOPROLOL SUCCINATE 75 MILLIGRAM(S): 50 TABLET, EXTENDED RELEASE ORAL at 05:10

## 2025-07-04 NOTE — PROGRESS NOTE ADULT - PROBLEM SELECTOR PLAN 3
pt now requiring a little oxygen again today  - BIPAP nocturnal while inpatient then CPAP at home or at rehab upon discharge  - continue lasix to 40mg BID given persistent pleural effusions and now requiring supplemental oxygen again

## 2025-07-04 NOTE — PROGRESS NOTE ADULT - PROBLEM SELECTOR PLAN 1
incidental imaging findings on CT angio --> bilateral pulmonary emboli involving right upper lobe lobar/segmental pulmonary arterial tree, right middle lobe lobar/segmental/subsegmental pulmonary arterial tree, right lower lobe segmental/subsegmental pulmonary arterial tree and left upper lobe subsegmental pulmonary arterial tree.    - continue eliquis for therapeutic anticoagulation

## 2025-07-04 NOTE — PROGRESS NOTE ADULT - SUBJECTIVE AND OBJECTIVE BOX
Tom León M.D.  Available thru [employer]     Patient is a 69y old  Female who presents with a chief complaint of Acute decompensated heart failure (02 Jul 2025 12:20)      SUBJECTIVE / OVERNIGHT EVENTS: Denies f/cn/v, CP SOB  No acute overnight events.  No complaints    MEDICATIONS  (STANDING):  apixaban 10 milliGRAM(s) Oral every 12 hours  atorvastatin 80 milliGRAM(s) Oral at bedtime  divalproex  milliGRAM(s) Oral two times a day  furosemide    Tablet 40 milliGRAM(s) Oral two times a day  insulin lispro (ADMELOG) corrective regimen sliding scale   SubCutaneous at bedtime  insulin lispro (ADMELOG) corrective regimen sliding scale   SubCutaneous three times a day before meals  melatonin 3 milliGRAM(s) Oral at bedtime  metoprolol succinate ER 75 milliGRAM(s) Oral daily  pantoprazole    Tablet 40 milliGRAM(s) Oral before breakfast  polyethylene glycol 3350 17 Gram(s) Oral at bedtime  sacubitril 24 mG/valsartan 26 mG 1 Tablet(s) Oral two times a day  senna 2 Tablet(s) Oral at bedtime  spironolactone 25 milliGRAM(s) Oral daily    MEDICATIONS  (PRN):  acetaminophen     Tablet .. 650 milliGRAM(s) Oral every 6 hours PRN Temp greater or equal to 38C (100.4F), Mild Pain (1 - 3)  aluminum hydroxide/magnesium hydroxide/simethicone Suspension 30 milliLiter(s) Oral every 4 hours PRN Dyspepsia  ondansetron Injectable 4 milliGRAM(s) IV Push every 8 hours PRN Nausea and/or Vomiting      Vital Signs Last 24 Hrs  T(C): 36.7 (04 Jul 2025 11:00), Max: 37.1 (03 Jul 2025 20:21)  T(F): 98 (04 Jul 2025 11:00), Max: 98.7 (03 Jul 2025 20:21)  HR: 78 (04 Jul 2025 11:00) (66 - 78)  BP: 101/60 (04 Jul 2025 11:00) (101/60 - 127/78)  BP(mean): --  RR: 18 (04 Jul 2025 11:00) (18 - 20)  SpO2: 95% (04 Jul 2025 11:00) (95% - 99%)    Parameters below as of 04 Jul 2025 11:00  Patient On (Oxygen Delivery Method): nasal cannula  O2 Flow (L/min): 1      PHYSICAL EXAM:    CONSTITUTIONAL: NAD, well-developed, well-groomed  EYES: PERRLA; conjunctiva and sclera clear  ENMT: Moist oral mucosa  RESPIRATORY: Normal respiratory effort; lungs are clear to auscultation bilaterally; decreased breath sounds at the bases  CARDIOVASCULAR: Regular rate and rhythm, normal S1 and S2, no murmur/rub/gallop; 1+ lower extremity edema;   ABDOMEN: Nontender to palpation,  no rebound/guarding; No hepatosplenomegaly  MUSCULOSKELETAL:  no clubbing or cyanosis of digits  PSYCH: A+O grossly; affect appropriate  NEUROLOGY: no gross sensory deficits       LABS:          CAPILLARY BLOOD GLUCOSE  CAPILLARY BLOOD GLUCOSE      POCT Blood Glucose.: 140 mg/dL (04 Jul 2025 11:16)  POCT Blood Glucose.: 95 mg/dL (04 Jul 2025 07:22)  POCT Blood Glucose.: 110 mg/dL (03 Jul 2025 21:11)  POCT Blood Glucose.: 110 mg/dL (03 Jul 2025 17:17)      RADIOLOGY & ADDITIONAL TESTS:

## 2025-07-04 NOTE — PROGRESS NOTE ADULT - PROBLEM SELECTOR PLAN 2
On 6/14/25, TTE with EF 30-35% with wall motion abnormalities. Diuresed with Bumex drip earlier in hospitalization.   - GDMT: +BB. + ARNI, +MRA, SGLT2i upon discharge.  - s/p cMRI and Nuclear Scan - negative for infiltrative diease  - pt found to have a new malignancy.   - s/p CT coronaries -->Coronary CTA: Left coronary artery dominance. No significant stenosis of LAD or LCx. Minimal to mild category disease noted second to calcified and noncalcified plaque. not concerning for ischemic  cardiomyopathy  - continue lasix 40mg bid given persist pleural effusions seen on CT coronaries

## 2025-07-05 LAB
GLUCOSE BLDC GLUCOMTR-MCNC: 149 MG/DL — HIGH (ref 70–99)
GLUCOSE BLDC GLUCOMTR-MCNC: 216 MG/DL — HIGH (ref 70–99)
GLUCOSE BLDC GLUCOMTR-MCNC: 95 MG/DL — SIGNIFICANT CHANGE UP (ref 70–99)
GLUCOSE BLDC GLUCOMTR-MCNC: 99 MG/DL — SIGNIFICANT CHANGE UP (ref 70–99)

## 2025-07-05 PROCEDURE — 99232 SBSQ HOSP IP/OBS MODERATE 35: CPT

## 2025-07-05 RX ORDER — SUCRALFATE 1 G
1 TABLET ORAL
Refills: 0 | Status: COMPLETED | OUTPATIENT
Start: 2025-07-05 | End: 2025-07-07

## 2025-07-05 RX ORDER — SODIUM CHLORIDE 0.65 %
1 AEROSOL, SPRAY (ML) NASAL
Refills: 0 | Status: DISCONTINUED | OUTPATIENT
Start: 2025-07-05 | End: 2025-07-07

## 2025-07-05 RX ADMIN — Medication 2 TABLET(S): at 21:46

## 2025-07-05 RX ADMIN — Medication 3 MILLIGRAM(S): at 21:46

## 2025-07-05 RX ADMIN — Medication 1 GRAM(S): at 17:31

## 2025-07-05 RX ADMIN — Medication 25 MILLIGRAM(S): at 05:17

## 2025-07-05 RX ADMIN — FUROSEMIDE 40 MILLIGRAM(S): 10 INJECTION INTRAMUSCULAR; INTRAVENOUS at 05:17

## 2025-07-05 RX ADMIN — Medication 500 MILLIGRAM(S): at 05:17

## 2025-07-05 RX ADMIN — Medication 1 SPRAY(S): at 17:31

## 2025-07-05 RX ADMIN — ATORVASTATIN CALCIUM 80 MILLIGRAM(S): 80 TABLET, FILM COATED ORAL at 21:46

## 2025-07-05 RX ADMIN — METOPROLOL SUCCINATE 75 MILLIGRAM(S): 50 TABLET, EXTENDED RELEASE ORAL at 05:17

## 2025-07-05 RX ADMIN — SACUBITRIL AND VALSARTAN 1 TABLET(S): 6; 6 PELLET ORAL at 05:17

## 2025-07-05 RX ADMIN — Medication 500 MILLIGRAM(S): at 17:31

## 2025-07-05 RX ADMIN — APIXABAN 10 MILLIGRAM(S): 5 TABLET, FILM COATED ORAL at 05:17

## 2025-07-05 RX ADMIN — SACUBITRIL AND VALSARTAN 1 TABLET(S): 6; 6 PELLET ORAL at 17:31

## 2025-07-05 RX ADMIN — APIXABAN 10 MILLIGRAM(S): 5 TABLET, FILM COATED ORAL at 17:31

## 2025-07-05 RX ADMIN — FUROSEMIDE 40 MILLIGRAM(S): 10 INJECTION INTRAMUSCULAR; INTRAVENOUS at 13:34

## 2025-07-05 RX ADMIN — Medication 40 MILLIGRAM(S): at 05:18

## 2025-07-05 NOTE — PROGRESS NOTE ADULT - SUBJECTIVE AND OBJECTIVE BOX
Tom León M.D.  Available thru [employer]     Patient is a 69y old  Female who presents with a chief complaint of Acute decompensated heart failure (02 Jul 2025 12:20)      SUBJECTIVE / OVERNIGHT EVENTS: Denies f/cn/v, CP SOB. notes dry cough, nose dryness, some heartburn.  No acute overnight events.  No complaints    MEDICATIONS  (STANDING):  apixaban 10 milliGRAM(s) Oral every 12 hours  atorvastatin 80 milliGRAM(s) Oral at bedtime  divalproex  milliGRAM(s) Oral two times a day  furosemide    Tablet 40 milliGRAM(s) Oral two times a day  insulin lispro (ADMELOG) corrective regimen sliding scale   SubCutaneous three times a day before meals  insulin lispro (ADMELOG) corrective regimen sliding scale   SubCutaneous at bedtime  melatonin 3 milliGRAM(s) Oral at bedtime  metoprolol succinate ER 75 milliGRAM(s) Oral daily  pantoprazole    Tablet 40 milliGRAM(s) Oral before breakfast  polyethylene glycol 3350 17 Gram(s) Oral at bedtime  sacubitril 24 mG/valsartan 26 mG 1 Tablet(s) Oral two times a day  senna 2 Tablet(s) Oral at bedtime  sodium chloride 0.65% Nasal 1 Spray(s) Both Nostrils two times a day  spironolactone 25 milliGRAM(s) Oral daily  sucralfate 1 Gram(s) Oral two times a day    MEDICATIONS  (PRN):  acetaminophen     Tablet .. 650 milliGRAM(s) Oral every 6 hours PRN Temp greater or equal to 38C (100.4F), Mild Pain (1 - 3)  aluminum hydroxide/magnesium hydroxide/simethicone Suspension 30 milliLiter(s) Oral every 4 hours PRN Dyspepsia  famotidine    Tablet 20 milliGRAM(s) Oral daily PRN heartburn  ondansetron Injectable 4 milliGRAM(s) IV Push every 8 hours PRN Nausea and/or Vomiting    Vital Signs Last 24 Hrs  T(C): 36.7 (05 Jul 2025 11:00), Max: 36.8 (05 Jul 2025 04:00)  T(F): 98.1 (05 Jul 2025 11:00), Max: 98.3 (05 Jul 2025 04:00)  HR: 81 (05 Jul 2025 11:00) (73 - 84)  BP: 121/78 (05 Jul 2025 11:00) (103/68 - 121/78)  BP(mean): --  RR: 18 (05 Jul 2025 11:00) (18 - 20)  SpO2: 98% (05 Jul 2025 11:00) (93% - 100%)    Parameters below as of 05 Jul 2025 11:00  Patient On (Oxygen Delivery Method): nasal cannula  O2 Flow (L/min): 1    PHYSICAL EXAM:    CONSTITUTIONAL: NAD, well-developed, well-groomed  EYES: PERRLA; conjunctiva and sclera clear  ENMT: Moist oral mucosa  RESPIRATORY: Normal respiratory effort; lungs are clear to auscultation bilaterally; decreased breath sounds at the bases  CARDIOVASCULAR: Regular rate and rhythm, normal S1 and S2, no murmur/rub/gallop; 1+ lower extremity edema;   ABDOMEN: Nontender to palpation,  no rebound/guarding; No hepatosplenomegaly  MUSCULOSKELETAL:  no clubbing or cyanosis of digits  PSYCH: A+O grossly; affect appropriate  NEUROLOGY: no gross sensory deficits       LABS: no new         CAPILLARY BLOOD GLUCOSE  CAPILLARY BLOOD GLUCOSE      POCT Blood Glucose.: 140 mg/dL (04 Jul 2025 11:16)  POCT Blood Glucose.: 95 mg/dL (04 Jul 2025 07:22)  POCT Blood Glucose.: 110 mg/dL (03 Jul 2025 21:11)  POCT Blood Glucose.: 110 mg/dL (03 Jul 2025 17:17)      RADIOLOGY & ADDITIONAL TESTS:

## 2025-07-06 LAB
ALBUMIN SERPL ELPH-MCNC: 2.5 G/DL — LOW (ref 3.3–5)
ALP SERPL-CCNC: 68 U/L — SIGNIFICANT CHANGE UP (ref 40–120)
ALT FLD-CCNC: 9 U/L — LOW (ref 10–45)
ANION GAP SERPL CALC-SCNC: 11 MMOL/L — SIGNIFICANT CHANGE UP (ref 5–17)
AST SERPL-CCNC: 12 U/L — SIGNIFICANT CHANGE UP (ref 10–40)
BILIRUB SERPL-MCNC: 0.2 MG/DL — SIGNIFICANT CHANGE UP (ref 0.2–1.2)
BUN SERPL-MCNC: 35 MG/DL — HIGH (ref 7–23)
CALCIUM SERPL-MCNC: 9.4 MG/DL — SIGNIFICANT CHANGE UP (ref 8.4–10.5)
CHLORIDE SERPL-SCNC: 101 MMOL/L — SIGNIFICANT CHANGE UP (ref 96–108)
CO2 SERPL-SCNC: 24 MMOL/L — SIGNIFICANT CHANGE UP (ref 22–31)
CREAT SERPL-MCNC: 1.3 MG/DL — SIGNIFICANT CHANGE UP (ref 0.5–1.3)
EGFR: 44 ML/MIN/1.73M2 — LOW
EGFR: 44 ML/MIN/1.73M2 — LOW
GLUCOSE BLDC GLUCOMTR-MCNC: 112 MG/DL — HIGH (ref 70–99)
GLUCOSE BLDC GLUCOMTR-MCNC: 130 MG/DL — HIGH (ref 70–99)
GLUCOSE BLDC GLUCOMTR-MCNC: 157 MG/DL — HIGH (ref 70–99)
GLUCOSE BLDC GLUCOMTR-MCNC: 97 MG/DL — SIGNIFICANT CHANGE UP (ref 70–99)
GLUCOSE SERPL-MCNC: 78 MG/DL — SIGNIFICANT CHANGE UP (ref 70–99)
HCT VFR BLD CALC: 30.7 % — LOW (ref 34.5–45)
HGB BLD-MCNC: 9.1 G/DL — LOW (ref 11.5–15.5)
MCHC RBC-ENTMCNC: 26.9 PG — LOW (ref 27–34)
MCHC RBC-ENTMCNC: 29.6 G/DL — LOW (ref 32–36)
MCV RBC AUTO: 90.8 FL — SIGNIFICANT CHANGE UP (ref 80–100)
NRBC # BLD AUTO: 0 K/UL — SIGNIFICANT CHANGE UP (ref 0–0)
NRBC # FLD: 0 K/UL — SIGNIFICANT CHANGE UP (ref 0–0)
NRBC BLD AUTO-RTO: 0 /100 WBCS — SIGNIFICANT CHANGE UP (ref 0–0)
PLATELET # BLD AUTO: 276 K/UL — SIGNIFICANT CHANGE UP (ref 150–400)
PMV BLD: 11.5 FL — SIGNIFICANT CHANGE UP (ref 7–13)
POTASSIUM SERPL-MCNC: 4.6 MMOL/L — SIGNIFICANT CHANGE UP (ref 3.5–5.3)
POTASSIUM SERPL-SCNC: 4.6 MMOL/L — SIGNIFICANT CHANGE UP (ref 3.5–5.3)
PROT SERPL-MCNC: 6.5 G/DL — SIGNIFICANT CHANGE UP (ref 6–8.3)
RBC # BLD: 3.38 M/UL — LOW (ref 3.8–5.2)
RBC # FLD: 17 % — HIGH (ref 10.3–14.5)
SODIUM SERPL-SCNC: 136 MMOL/L — SIGNIFICANT CHANGE UP (ref 135–145)
WBC # BLD: 6.92 K/UL — SIGNIFICANT CHANGE UP (ref 3.8–10.5)
WBC # FLD AUTO: 6.92 K/UL — SIGNIFICANT CHANGE UP (ref 3.8–10.5)

## 2025-07-06 PROCEDURE — 99231 SBSQ HOSP IP/OBS SF/LOW 25: CPT

## 2025-07-06 RX ORDER — BENZONATATE 100 MG
100 CAPSULE ORAL THREE TIMES A DAY
Refills: 0 | Status: DISCONTINUED | OUTPATIENT
Start: 2025-07-06 | End: 2025-07-07

## 2025-07-06 RX ORDER — DEXTROMETHORPHAN HBR, GUAIFENESIN 200 MG/10ML
100 LIQUID ORAL THREE TIMES A DAY
Refills: 0 | Status: DISCONTINUED | OUTPATIENT
Start: 2025-07-06 | End: 2025-07-07

## 2025-07-06 RX ADMIN — POLYETHYLENE GLYCOL 3350 17 GRAM(S): 17 POWDER, FOR SOLUTION ORAL at 21:30

## 2025-07-06 RX ADMIN — Medication 1 GRAM(S): at 05:15

## 2025-07-06 RX ADMIN — SACUBITRIL AND VALSARTAN 1 TABLET(S): 6; 6 PELLET ORAL at 17:14

## 2025-07-06 RX ADMIN — Medication 40 MILLIGRAM(S): at 05:15

## 2025-07-06 RX ADMIN — Medication 1 GRAM(S): at 17:14

## 2025-07-06 RX ADMIN — Medication 100 MILLIGRAM(S): at 21:31

## 2025-07-06 RX ADMIN — Medication 500 MILLIGRAM(S): at 05:14

## 2025-07-06 RX ADMIN — FUROSEMIDE 40 MILLIGRAM(S): 10 INJECTION INTRAMUSCULAR; INTRAVENOUS at 13:15

## 2025-07-06 RX ADMIN — ATORVASTATIN CALCIUM 80 MILLIGRAM(S): 80 TABLET, FILM COATED ORAL at 21:32

## 2025-07-06 RX ADMIN — Medication 25 MILLIGRAM(S): at 05:14

## 2025-07-06 RX ADMIN — METOPROLOL SUCCINATE 75 MILLIGRAM(S): 50 TABLET, EXTENDED RELEASE ORAL at 05:15

## 2025-07-06 RX ADMIN — Medication 100 MILLIGRAM(S): at 13:15

## 2025-07-06 RX ADMIN — SACUBITRIL AND VALSARTAN 1 TABLET(S): 6; 6 PELLET ORAL at 05:14

## 2025-07-06 RX ADMIN — APIXABAN 10 MILLIGRAM(S): 5 TABLET, FILM COATED ORAL at 05:15

## 2025-07-06 RX ADMIN — Medication 1 SPRAY(S): at 17:14

## 2025-07-06 RX ADMIN — FUROSEMIDE 40 MILLIGRAM(S): 10 INJECTION INTRAMUSCULAR; INTRAVENOUS at 05:14

## 2025-07-06 RX ADMIN — Medication 3 MILLIGRAM(S): at 21:32

## 2025-07-06 RX ADMIN — Medication 500 MILLIGRAM(S): at 17:13

## 2025-07-06 RX ADMIN — Medication 2 TABLET(S): at 21:30

## 2025-07-06 RX ADMIN — INSULIN LISPRO 1: 100 INJECTION, SOLUTION INTRAVENOUS; SUBCUTANEOUS at 11:19

## 2025-07-06 RX ADMIN — APIXABAN 10 MILLIGRAM(S): 5 TABLET, FILM COATED ORAL at 17:14

## 2025-07-06 NOTE — PROGRESS NOTE ADULT - PROBLEM SELECTOR PLAN 2
On 6/14/25, TTE with EF 30-35% with wall motion abnormalities. Diuresed with Bumex drip earlier in hospitalization.   - GDMT: +BB. + ARNI, +MRA, SGLT2i upon discharge.  - s/p cMRI and Nuclear Scan - negative for infiltrative diease  - s/p CT coronaries -->Coronary CTA: Left coronary artery dominance. No significant stenosis of LAD or LCx. Minimal to mild category disease noted second to calcified and noncalcified plaque. not concerning for ischemic  cardiomyopathy  - continue lasix 40mg bid given persist pleural effusions seen on CT coronaries

## 2025-07-06 NOTE — PROGRESS NOTE ADULT - PROBLEM SELECTOR PLAN 1
incidental imaging findings on CT angio --> bilateral pulmonary emboli involving right upper lobe lobar/segmental pulmonary arterial tree, right middle lobe lobar/segmental/subsegmental pulmonary arterial tree, right lower lobe segmental/subsegmental pulmonary arterial tree and left upper lobe subsegmental pulmonary arterial tree. pt w/new malignancy.  - continue eliquis for therapeutic anticoagulation

## 2025-07-06 NOTE — PROGRESS NOTE ADULT - SUBJECTIVE AND OBJECTIVE BOX
Tom León M.D.  Available thru [employer] Teams     Patient is a 69y old  Female who presents with a chief complaint of Acute decompensated heart failure (02 Jul 2025 12:20)      SUBJECTIVE / OVERNIGHT EVENTS: Denies f/cn/v, CP SOB. endorses continued cough  No acute overnight events.  No complaints    MEDICATIONS  (STANDING):  apixaban 10 milliGRAM(s) Oral every 12 hours  atorvastatin 80 milliGRAM(s) Oral at bedtime  divalproex  milliGRAM(s) Oral two times a day  furosemide    Tablet 40 milliGRAM(s) Oral two times a day  insulin lispro (ADMELOG) corrective regimen sliding scale   SubCutaneous three times a day before meals  insulin lispro (ADMELOG) corrective regimen sliding scale   SubCutaneous at bedtime  melatonin 3 milliGRAM(s) Oral at bedtime  metoprolol succinate ER 75 milliGRAM(s) Oral daily  pantoprazole    Tablet 40 milliGRAM(s) Oral before breakfast  polyethylene glycol 3350 17 Gram(s) Oral at bedtime  sacubitril 24 mG/valsartan 26 mG 1 Tablet(s) Oral two times a day  senna 2 Tablet(s) Oral at bedtime  sodium chloride 0.65% Nasal 1 Spray(s) Both Nostrils two times a day  spironolactone 25 milliGRAM(s) Oral daily  sucralfate 1 Gram(s) Oral two times a day    MEDICATIONS  (PRN):  acetaminophen     Tablet .. 650 milliGRAM(s) Oral every 6 hours PRN Temp greater or equal to 38C (100.4F), Mild Pain (1 - 3)  aluminum hydroxide/magnesium hydroxide/simethicone Suspension 30 milliLiter(s) Oral every 4 hours PRN Dyspepsia  famotidine    Tablet 20 milliGRAM(s) Oral daily PRN heartburn  ondansetron Injectable 4 milliGRAM(s) IV Push every 8 hours PRN Nausea and/or Vomiting    Vital Signs Last 24 Hrs  Vital Signs Last 24 Hrs  T(C): 36.8 (06 Jul 2025 11:01), Max: 36.8 (06 Jul 2025 11:01)  T(F): 98.2 (06 Jul 2025 11:01), Max: 98.2 (06 Jul 2025 11:01)  HR: 77 (06 Jul 2025 11:01) (70 - 81)  BP: 113/70 (06 Jul 2025 11:01) (110/70 - 124/79)  BP(mean): --  RR: 18 (06 Jul 2025 11:01) (18 - 18)  SpO2: 96% (06 Jul 2025 11:01) (96% - 98%)    Parameters below as of 06 Jul 2025 11:01  Patient On (Oxygen Delivery Method): nasal cannula  O2 Flow (L/min): 1    PHYSICAL EXAM:    CONSTITUTIONAL: NAD, well-developed, well-groomed  EYES: PERRLA; conjunctiva and sclera clear  ENMT: Moist oral mucosa  RESPIRATORY: Normal respiratory effort; lungs are clear to auscultation bilaterally; decreased breath sounds at the bases  CARDIOVASCULAR: Regular rate and rhythm, normal S1 and S2, no murmur/rub/gallop; 1+ lower extremity edema;   ABDOMEN: Nontender to palpation,  no rebound/guarding; No hepatosplenomegaly  MUSCULOSKELETAL:  no clubbing or cyanosis of digits  PSYCH: A+O grossly; affect appropriate  NEUROLOGY: no gross sensory deficits       LABS:                       9.1    6.92  )-----------( 276      ( 06 Jul 2025 06:54 )             30.7   07-06    136  |  101  |  35[H]  ----------------------------<  78  4.6   |  24  |  1.30    Ca    9.4      06 Jul 2025 06:55    TPro  6.5  /  Alb  2.5[L]  /  TBili  0.2  /  DBili  x   /  AST  12  /  ALT  9[L]  /  AlkPhos  68  07-06         CAPILLARY BLOOD GLUCOSE      POCT Blood Glucose.: 157 mg/dL (06 Jul 2025 11:09)  POCT Blood Glucose.: 97 mg/dL (06 Jul 2025 07:37)  POCT Blood Glucose.: 216 mg/dL (05 Jul 2025 21:01)  POCT Blood Glucose.: 149 mg/dL (05 Jul 2025 17:24)      RADIOLOGY & ADDITIONAL TESTS:

## 2025-07-07 ENCOUNTER — TRANSCRIPTION ENCOUNTER (OUTPATIENT)
Age: 70
End: 2025-07-07

## 2025-07-07 VITALS
SYSTOLIC BLOOD PRESSURE: 100 MMHG | OXYGEN SATURATION: 97 % | DIASTOLIC BLOOD PRESSURE: 65 MMHG | HEART RATE: 81 BPM | TEMPERATURE: 100 F | RESPIRATION RATE: 18 BRPM

## 2025-07-07 LAB
GLUCOSE BLDC GLUCOMTR-MCNC: 100 MG/DL — HIGH (ref 70–99)
GLUCOSE BLDC GLUCOMTR-MCNC: 115 MG/DL — HIGH (ref 70–99)
GLUCOSE BLDC GLUCOMTR-MCNC: 98 MG/DL — SIGNIFICANT CHANGE UP (ref 70–99)
HCT VFR BLD CALC: 32.1 % — LOW (ref 34.5–45)
HGB BLD-MCNC: 10.1 G/DL — LOW (ref 11.5–15.5)
MCHC RBC-ENTMCNC: 27.6 PG — SIGNIFICANT CHANGE UP (ref 27–34)
MCHC RBC-ENTMCNC: 31.5 G/DL — LOW (ref 32–36)
MCV RBC AUTO: 87.7 FL — SIGNIFICANT CHANGE UP (ref 80–100)
NRBC # BLD AUTO: 0 K/UL — SIGNIFICANT CHANGE UP (ref 0–0)
NRBC # FLD: 0 K/UL — SIGNIFICANT CHANGE UP (ref 0–0)
NRBC BLD AUTO-RTO: 0 /100 WBCS — SIGNIFICANT CHANGE UP (ref 0–0)
PLATELET # BLD AUTO: 272 K/UL — SIGNIFICANT CHANGE UP (ref 150–400)
PMV BLD: 11.8 FL — SIGNIFICANT CHANGE UP (ref 7–13)
RBC # BLD: 3.66 M/UL — LOW (ref 3.8–5.2)
RBC # FLD: 16.8 % — HIGH (ref 10.3–14.5)
WBC # BLD: 10.27 K/UL — SIGNIFICANT CHANGE UP (ref 3.8–10.5)
WBC # FLD AUTO: 10.27 K/UL — SIGNIFICANT CHANGE UP (ref 3.8–10.5)

## 2025-07-07 PROCEDURE — 83986 ASSAY PH BODY FLUID NOS: CPT

## 2025-07-07 PROCEDURE — 85027 COMPLETE CBC AUTOMATED: CPT

## 2025-07-07 PROCEDURE — 75574 CT ANGIO HRT W/3D IMAGE: CPT

## 2025-07-07 PROCEDURE — 88341 IMHCHEM/IMCYTCHM EA ADD ANTB: CPT

## 2025-07-07 PROCEDURE — 80164 ASSAY DIPROPYLACETIC ACD TOT: CPT

## 2025-07-07 PROCEDURE — 84145 PROCALCITONIN (PCT): CPT

## 2025-07-07 PROCEDURE — 83615 LACTATE (LD) (LDH) ENZYME: CPT

## 2025-07-07 PROCEDURE — 75561 CARDIAC MRI FOR MORPH W/DYE: CPT

## 2025-07-07 PROCEDURE — 88313 SPECIAL STAINS GROUP 2: CPT

## 2025-07-07 PROCEDURE — 38505 NEEDLE BIOPSY LYMPH NODES: CPT

## 2025-07-07 PROCEDURE — 87075 CULTR BACTERIA EXCEPT BLOOD: CPT

## 2025-07-07 PROCEDURE — 82435 ASSAY OF BLOOD CHLORIDE: CPT

## 2025-07-07 PROCEDURE — 83521 IG LIGHT CHAINS FREE EACH: CPT

## 2025-07-07 PROCEDURE — A9585: CPT

## 2025-07-07 PROCEDURE — 88184 FLOWCYTOMETRY/ TC 1 MARKER: CPT

## 2025-07-07 PROCEDURE — 82306 VITAMIN D 25 HYDROXY: CPT

## 2025-07-07 PROCEDURE — 85014 HEMATOCRIT: CPT

## 2025-07-07 PROCEDURE — 83880 ASSAY OF NATRIURETIC PEPTIDE: CPT

## 2025-07-07 PROCEDURE — 89051 BODY FLUID CELL COUNT: CPT

## 2025-07-07 PROCEDURE — 84157 ASSAY OF PROTEIN OTHER: CPT

## 2025-07-07 PROCEDURE — 86334 IMMUNOFIX E-PHORESIS SERUM: CPT

## 2025-07-07 PROCEDURE — 80061 LIPID PANEL: CPT

## 2025-07-07 PROCEDURE — 97530 THERAPEUTIC ACTIVITIES: CPT

## 2025-07-07 PROCEDURE — 87015 SPECIMEN INFECT AGNT CONCNTJ: CPT

## 2025-07-07 PROCEDURE — 82140 ASSAY OF AMMONIA: CPT

## 2025-07-07 PROCEDURE — 83605 ASSAY OF LACTIC ACID: CPT

## 2025-07-07 PROCEDURE — C8924: CPT

## 2025-07-07 PROCEDURE — A9561: CPT

## 2025-07-07 PROCEDURE — 85025 COMPLETE CBC W/AUTO DIFF WBC: CPT

## 2025-07-07 PROCEDURE — 80048 BASIC METABOLIC PNL TOTAL CA: CPT

## 2025-07-07 PROCEDURE — 85730 THROMBOPLASTIN TIME PARTIAL: CPT

## 2025-07-07 PROCEDURE — 85018 HEMOGLOBIN: CPT

## 2025-07-07 PROCEDURE — 84295 ASSAY OF SERUM SODIUM: CPT

## 2025-07-07 PROCEDURE — 85610 PROTHROMBIN TIME: CPT

## 2025-07-07 PROCEDURE — 71250 CT THORAX DX C-: CPT

## 2025-07-07 PROCEDURE — 84132 ASSAY OF SERUM POTASSIUM: CPT

## 2025-07-07 PROCEDURE — 0241U: CPT

## 2025-07-07 PROCEDURE — 36600 WITHDRAWAL OF ARTERIAL BLOOD: CPT

## 2025-07-07 PROCEDURE — 86900 BLOOD TYPING SEROLOGIC ABO: CPT

## 2025-07-07 PROCEDURE — 83036 HEMOGLOBIN GLYCOSYLATED A1C: CPT

## 2025-07-07 PROCEDURE — 88360 TUMOR IMMUNOHISTOCHEM/MANUAL: CPT

## 2025-07-07 PROCEDURE — 78830 RP LOCLZJ TUM SPECT W/CT 1: CPT

## 2025-07-07 PROCEDURE — 86850 RBC ANTIBODY SCREEN: CPT

## 2025-07-07 PROCEDURE — 95816 EEG AWAKE AND DROWSY: CPT

## 2025-07-07 PROCEDURE — 97161 PT EVAL LOW COMPLEX 20 MIN: CPT

## 2025-07-07 PROCEDURE — 93005 ELECTROCARDIOGRAM TRACING: CPT

## 2025-07-07 PROCEDURE — 82945 GLUCOSE OTHER FLUID: CPT

## 2025-07-07 PROCEDURE — 84155 ASSAY OF PROTEIN SERUM: CPT

## 2025-07-07 PROCEDURE — 94640 AIRWAY INHALATION TREATMENT: CPT

## 2025-07-07 PROCEDURE — 84165 PROTEIN E-PHORESIS SERUM: CPT

## 2025-07-07 PROCEDURE — 82042 OTHER SOURCE ALBUMIN QUAN EA: CPT

## 2025-07-07 PROCEDURE — 97112 NEUROMUSCULAR REEDUCATION: CPT

## 2025-07-07 PROCEDURE — 85520 HEPARIN ASSAY: CPT

## 2025-07-07 PROCEDURE — A9538: CPT

## 2025-07-07 PROCEDURE — 82947 ASSAY GLUCOSE BLOOD QUANT: CPT

## 2025-07-07 PROCEDURE — 84443 ASSAY THYROID STIM HORMONE: CPT

## 2025-07-07 PROCEDURE — 88333 PATH CONSLTJ SURG CYTO XM 1: CPT

## 2025-07-07 PROCEDURE — 95700 EEG CONT REC W/VID EEG TECH: CPT

## 2025-07-07 PROCEDURE — 82803 BLOOD GASES ANY COMBINATION: CPT

## 2025-07-07 PROCEDURE — 87070 CULTURE OTHR SPECIMN AEROBIC: CPT

## 2025-07-07 PROCEDURE — 71046 X-RAY EXAM CHEST 2 VIEWS: CPT

## 2025-07-07 PROCEDURE — 95813 EEG EXTND MNTR 61-119 MIN: CPT

## 2025-07-07 PROCEDURE — 76942 ECHO GUIDE FOR BIOPSY: CPT

## 2025-07-07 PROCEDURE — 84166 PROTEIN E-PHORESIS/URINE/CSF: CPT

## 2025-07-07 PROCEDURE — 87205 SMEAR GRAM STAIN: CPT

## 2025-07-07 PROCEDURE — 80053 COMPREHEN METABOLIC PANEL: CPT

## 2025-07-07 PROCEDURE — 88381 MICRODISSECTION MANUAL: CPT

## 2025-07-07 PROCEDURE — 83735 ASSAY OF MAGNESIUM: CPT

## 2025-07-07 PROCEDURE — 70450 CT HEAD/BRAIN W/O DYE: CPT

## 2025-07-07 PROCEDURE — 82330 ASSAY OF CALCIUM: CPT

## 2025-07-07 PROCEDURE — 88305 TISSUE EXAM BY PATHOLOGIST: CPT

## 2025-07-07 PROCEDURE — 84156 ASSAY OF PROTEIN URINE: CPT

## 2025-07-07 PROCEDURE — 93306 TTE W/DOPPLER COMPLETE: CPT

## 2025-07-07 PROCEDURE — 97110 THERAPEUTIC EXERCISES: CPT

## 2025-07-07 PROCEDURE — 84100 ASSAY OF PHOSPHORUS: CPT

## 2025-07-07 PROCEDURE — 71045 X-RAY EXAM CHEST 1 VIEW: CPT

## 2025-07-07 PROCEDURE — 82962 GLUCOSE BLOOD TEST: CPT

## 2025-07-07 PROCEDURE — 88342 IMHCHEM/IMCYTCHM 1ST ANTB: CPT

## 2025-07-07 PROCEDURE — 74176 CT ABD & PELVIS W/O CONTRAST: CPT

## 2025-07-07 PROCEDURE — 88185 FLOWCYTOMETRY/TC ADD-ON: CPT

## 2025-07-07 PROCEDURE — 88173 CYTOPATH EVAL FNA REPORT: CPT

## 2025-07-07 PROCEDURE — 95714 VEEG EA 12-26 HR UNMNTR: CPT

## 2025-07-07 PROCEDURE — 99239 HOSP IP/OBS DSCHRG MGMT >30: CPT

## 2025-07-07 PROCEDURE — 87102 FUNGUS ISOLATION CULTURE: CPT

## 2025-07-07 PROCEDURE — 36415 COLL VENOUS BLD VENIPUNCTURE: CPT

## 2025-07-07 PROCEDURE — 87637 SARSCOV2&INF A&B&RSV AMP PRB: CPT

## 2025-07-07 PROCEDURE — 94660 CPAP INITIATION&MGMT: CPT

## 2025-07-07 PROCEDURE — 88112 CYTOPATH CELL ENHANCE TECH: CPT

## 2025-07-07 PROCEDURE — 86901 BLOOD TYPING SEROLOGIC RH(D): CPT

## 2025-07-07 PROCEDURE — 95711 VEEG 2-12 HR UNMONITORED: CPT

## 2025-07-07 PROCEDURE — 86335 IMMUNFIX E-PHORSIS/URINE/CSF: CPT

## 2025-07-07 PROCEDURE — 70551 MRI BRAIN STEM W/O DYE: CPT

## 2025-07-07 PROCEDURE — 81455 SO/HL 51/>GSAP DNA/DNA&RNA: CPT

## 2025-07-07 RX ORDER — METFORMIN HYDROCHLORIDE 850 MG/1
1 TABLET ORAL
Refills: 0 | DISCHARGE

## 2025-07-07 RX ORDER — SACUBITRIL AND VALSARTAN 6; 6 MG/1; MG/1
1 PELLET ORAL
Qty: 0 | Refills: 0 | DISCHARGE
Start: 2025-07-07

## 2025-07-07 RX ORDER — FUROSEMIDE 10 MG/ML
1 INJECTION INTRAMUSCULAR; INTRAVENOUS
Qty: 0 | Refills: 0 | DISCHARGE
Start: 2025-07-07

## 2025-07-07 RX ORDER — SENNA 187 MG
2 TABLET ORAL
Qty: 0 | Refills: 0 | DISCHARGE
Start: 2025-07-07

## 2025-07-07 RX ORDER — GABAPENTIN 400 MG/1
1 CAPSULE ORAL
Refills: 0 | DISCHARGE

## 2025-07-07 RX ORDER — POLYETHYLENE GLYCOL 3350 17 G/17G
17 POWDER, FOR SOLUTION ORAL
Qty: 0 | Refills: 0 | DISCHARGE
Start: 2025-07-07

## 2025-07-07 RX ORDER — HYDROCHLOROTHIAZIDE 50 MG/1
1 TABLET ORAL
Refills: 0 | DISCHARGE

## 2025-07-07 RX ORDER — ACETAMINOPHEN 500 MG/5ML
2 LIQUID (ML) ORAL
Qty: 0 | Refills: 0 | DISCHARGE
Start: 2025-07-07

## 2025-07-07 RX ORDER — METOPROLOL SUCCINATE 50 MG/1
1 TABLET, EXTENDED RELEASE ORAL
Refills: 0 | DISCHARGE

## 2025-07-07 RX ORDER — DAPAGLIFLOZIN 5 MG/1
1 TABLET, FILM COATED ORAL
Qty: 0 | Refills: 0 | DISCHARGE

## 2025-07-07 RX ORDER — BENZONATATE 100 MG
1 CAPSULE ORAL
Qty: 0 | Refills: 0 | DISCHARGE
Start: 2025-07-07

## 2025-07-07 RX ORDER — DEXTROMETHORPHAN HBR, GUAIFENESIN 200 MG/10ML
5 LIQUID ORAL
Qty: 0 | Refills: 0 | DISCHARGE
Start: 2025-07-07

## 2025-07-07 RX ORDER — PAROXETINE HYDROCHLORIDE 20 MG/1
1 TABLET, FILM COATED ORAL
Refills: 0 | DISCHARGE

## 2025-07-07 RX ORDER — ALBUTEROL SULFATE 2.5 MG/3ML
2 VIAL, NEBULIZER (ML) INHALATION
Refills: 0 | DISCHARGE

## 2025-07-07 RX ORDER — METOPROLOL SUCCINATE 50 MG/1
3 TABLET, EXTENDED RELEASE ORAL
Qty: 0 | Refills: 0 | DISCHARGE
Start: 2025-07-07

## 2025-07-07 RX ORDER — SPIRONOLACTONE 25 MG
1 TABLET ORAL
Qty: 0 | Refills: 0 | DISCHARGE
Start: 2025-07-07

## 2025-07-07 RX ORDER — APIXABAN 5 MG/1
2 TABLET, FILM COATED ORAL
Qty: 0 | Refills: 0 | DISCHARGE
Start: 2025-07-07

## 2025-07-07 RX ORDER — FUROSEMIDE 10 MG/ML
1 INJECTION INTRAMUSCULAR; INTRAVENOUS
Refills: 0 | DISCHARGE

## 2025-07-07 RX ADMIN — Medication 100 MILLIGRAM(S): at 05:28

## 2025-07-07 RX ADMIN — FUROSEMIDE 40 MILLIGRAM(S): 10 INJECTION INTRAMUSCULAR; INTRAVENOUS at 14:00

## 2025-07-07 RX ADMIN — Medication 1 GRAM(S): at 05:29

## 2025-07-07 RX ADMIN — SACUBITRIL AND VALSARTAN 1 TABLET(S): 6; 6 PELLET ORAL at 05:28

## 2025-07-07 RX ADMIN — Medication 25 MILLIGRAM(S): at 05:29

## 2025-07-07 RX ADMIN — APIXABAN 10 MILLIGRAM(S): 5 TABLET, FILM COATED ORAL at 05:28

## 2025-07-07 RX ADMIN — FUROSEMIDE 40 MILLIGRAM(S): 10 INJECTION INTRAMUSCULAR; INTRAVENOUS at 05:29

## 2025-07-07 RX ADMIN — SACUBITRIL AND VALSARTAN 1 TABLET(S): 6; 6 PELLET ORAL at 17:13

## 2025-07-07 RX ADMIN — Medication 40 MILLIGRAM(S): at 05:29

## 2025-07-07 RX ADMIN — Medication 500 MILLIGRAM(S): at 05:28

## 2025-07-07 RX ADMIN — Medication 100 MILLIGRAM(S): at 14:00

## 2025-07-07 RX ADMIN — APIXABAN 10 MILLIGRAM(S): 5 TABLET, FILM COATED ORAL at 17:13

## 2025-07-07 RX ADMIN — Medication 1 SPRAY(S): at 05:29

## 2025-07-07 RX ADMIN — Medication 500 MILLIGRAM(S): at 17:13

## 2025-07-07 RX ADMIN — METOPROLOL SUCCINATE 75 MILLIGRAM(S): 50 TABLET, EXTENDED RELEASE ORAL at 05:28

## 2025-07-07 NOTE — PROGRESS NOTE ADULT - PROBLEM SELECTOR PROBLEM 1
Acute respiratory failure with hypoxia
Acute respiratory failure with hypoxia
Heart failure with reduced ejection fraction
Acute respiratory failure with hypoxia
Heart failure with reduced ejection fraction
Acute respiratory failure with hypoxia
Heart failure with reduced ejection fraction
Pulmonary embolism
Adenocarcinoma, lung
Heart failure with reduced ejection fraction
Heart failure with reduced ejection fraction
Acute respiratory failure with hypoxia
Heart failure with reduced ejection fraction
Acute respiratory failure with hypoxia
Acute respiratory failure with hypoxia
Heart failure with reduced ejection fraction
Heart failure with reduced ejection fraction
Acute respiratory failure with hypoxia
Heart failure with reduced ejection fraction
Pulmonary embolism
Acute respiratory failure with hypoxia
Pulmonary embolism
Pulmonary embolism
Heart failure with reduced ejection fraction
Acute respiratory failure with hypoxia
Pulmonary embolism

## 2025-07-07 NOTE — DISCHARGE NOTE NURSING/CASE MANAGEMENT/SOCIAL WORK - PATIENT PORTAL LINK FT
You can access the FollowMyHealth Patient Portal offered by [facility] by registering at the following website: [email or web address]. By joining [employer]’s FollowMyHealth portal, you will also be able to view your health information using other applications (apps) compatible with our system.

## 2025-07-07 NOTE — PROGRESS NOTE ADULT - SUBJECTIVE AND OBJECTIVE BOX
Gabby Estrada MD  Division of Hospital Medicine  Please contact via MS Teams (prefer message first)  Office: [phone]    Patient is a 69y old  Female who presents with a chief complaint of Acute decompensated heart failure (07 Jul 2025 10:29)      SUBJECTIVE / OVERNIGHT EVENTS:  no acute events overnight, vss, afebrile  pt has no complaints, feels okay    ROS:  14 point ROS negative in detail except stated as above    MEDICATIONS  (STANDING):  apixaban 10 milliGRAM(s) Oral every 12 hours  atorvastatin 80 milliGRAM(s) Oral at bedtime  benzonatate 100 milliGRAM(s) Oral three times a day  divalproex  milliGRAM(s) Oral two times a day  furosemide    Tablet 40 milliGRAM(s) Oral two times a day  insulin lispro (ADMELOG) corrective regimen sliding scale   SubCutaneous three times a day before meals  insulin lispro (ADMELOG) corrective regimen sliding scale   SubCutaneous at bedtime  melatonin 3 milliGRAM(s) Oral at bedtime  metoprolol succinate ER 75 milliGRAM(s) Oral daily  pantoprazole    Tablet 40 milliGRAM(s) Oral before breakfast  polyethylene glycol 3350 17 Gram(s) Oral at bedtime  sacubitril 24 mG/valsartan 26 mG 1 Tablet(s) Oral two times a day  senna 2 Tablet(s) Oral at bedtime  sodium chloride 0.65% Nasal 1 Spray(s) Both Nostrils two times a day  spironolactone 25 milliGRAM(s) Oral daily    MEDICATIONS  (PRN):  acetaminophen     Tablet .. 650 milliGRAM(s) Oral every 6 hours PRN Temp greater or equal to 38C (100.4F), Mild Pain (1 - 3)  aluminum hydroxide/magnesium hydroxide/simethicone Suspension 30 milliLiter(s) Oral every 4 hours PRN Dyspepsia  famotidine    Tablet 20 milliGRAM(s) Oral daily PRN heartburn  guaiFENesin Oral Liquid (Sugar-Free) 100 milliGRAM(s) Oral three times a day PRN Cough  ondansetron Injectable 4 milliGRAM(s) IV Push every 8 hours PRN Nausea and/or Vomiting      CAPILLARY BLOOD GLUCOSE      POCT Blood Glucose.: 100 mg/dL (07 Jul 2025 07:30)  POCT Blood Glucose.: 130 mg/dL (06 Jul 2025 21:09)  POCT Blood Glucose.: 112 mg/dL (06 Jul 2025 17:02)    I&O's Summary    06 Jul 2025 07:01  -  07 Jul 2025 07:00  --------------------------------------------------------  IN: 520 mL / OUT: 1250 mL / NET: -730 mL    07 Jul 2025 07:01  -  07 Jul 2025 11:12  --------------------------------------------------------  IN: 100 mL / OUT: 0 mL / NET: 100 mL        PHYSICAL EXAM:  Vital Signs Last 24 Hrs  T(C): 36.7 (07 Jul 2025 11:04), Max: 36.7 (06 Jul 2025 20:00)  T(F): 98.1 (07 Jul 2025 11:04), Max: 98.1 (06 Jul 2025 20:00)  HR: 82 (07 Jul 2025 11:04) (70 - 82)  BP: 105/69 (07 Jul 2025 11:04) (105/69 - 124/77)  BP(mean): --  RR: 18 (07 Jul 2025 11:04) (18 - 20)  SpO2: 100% (07 Jul 2025 11:04) (97% - 100%)    Parameters below as of 07 Jul 2025 11:04  Patient On (Oxygen Delivery Method): nasal cannula  O2 Flow (L/min): 1    GENERAL: NAD, well-developed  HEAD:  Atraumatic, Normocephalic  EYES: EOMI, PERRLA, conjunctiva and sclera clear  NECK: Supple, No JVD  CHEST/LUNG: Clear to auscultation bilaterally; No wheeze  HEART: Regular rate and rhythm; No murmurs, rubs, or gallops  ABDOMEN: Soft, Nontender, Nondistended; Bowel sounds present  EXTREMITIES:  2+ Peripheral Pulses, No clubbing, cyanosis, or edema  NEUROLOGY: AAOx3; non-focal  SKIN: No rashes or lesions    LABS:                        10.1   10.27 )-----------( 272      ( 07 Jul 2025 06:13 )             32.1     07-06    136  |  101  |  35[H]  ----------------------------<  78  4.6   |  24  |  1.30    Ca    9.4      06 Jul 2025 06:55    TPro  6.5  /  Alb  2.5[L]  /  TBili  0.2  /  DBili  x   /  AST  12  /  ALT  9[L]  /  AlkPhos  68  07-06          Urinalysis Basic - ( 06 Jul 2025 06:55 )    Color: x / Appearance: x / SG: x / pH: x  Gluc: 78 mg/dL / Ketone: x  / Bili: x / Urobili: x   Blood: x / Protein: x / Nitrite: x   Leuk Esterase: x / RBC: x / WBC x   Sq Epi: x / Non Sq Epi: x / Bacteria: x        RADIOLOGY & ADDITIONAL TESTS:    Imaging Personally Reviewed:    Consultant(s) Notes Reviewed:      Care Discussed with Consultants/Other Providers:

## 2025-07-07 NOTE — PROGRESS NOTE ADULT - REASON FOR ADMISSION
Acute decompensated heart failure

## 2025-07-07 NOTE — DISCHARGE NOTE PROVIDER - CARE PROVIDERS DIRECT ADDRESSES
,DirectAddress_Unknown,[email or web address] ,DirectAddress_Unknown,[email or web address]

## 2025-07-07 NOTE — PROGRESS NOTE ADULT - PROVIDER SPECIALTY LIST ADULT
Electrophysiology
Electrophysiology
Heme/Onc
Hospitalist
Internal Medicine
Pulmonology
CARYL
Hospitalist
Hospitalist
Neurology
Pulmonology
Electrophysiology
Heart Failure
Hospitalist
Hospitalist
Pulmonology
Electrophysiology
Electrophysiology
Neurology
Electrophysiology
Hospitalist
Electrophysiology
Heart Failure
Hospitalist
Hospitalist
Heart Failure
Hospitalist

## 2025-07-07 NOTE — DISCHARGE NOTE PROVIDER - CARE PROVIDER_API CALL
Radha Wasserman  Clinical Cardiac Electrophysiology  [address], [address]  [place] [address]  Phone: [phone]  Fax: [phone]  Follow Up Time: 1 week    Elmo Caal  Advanced Heart Failure and Transplant Cardiology  [address] [address]  Phone: [phone]  Fax: [phone]  Follow Up Time: 2 weeks    Moises He  Hematology & Oncology  [address] [place] [address]  Phone: [phone]  Fax: [phone]  Follow Up Time: 2 weeks   Radha Wasserman  Clinical Cardiac Electrophysiology  [address], [address]  [place] [address]  Phone: [phone]  Fax: [phone]  Follow Up Time: 1 week    Moises He  Hematology & Oncology  [address] [place] [address]  Phone: [phone]  Fax: [phone]  Follow Up Time: 2 weeks    Billie Marie  Advanced Heart Failure and Transplant Cardiology  [address] [place] [address]  Phone: [phone]  Fax: [phone]  Follow Up Time:

## 2025-07-07 NOTE — DISCHARGE NOTE PROVIDER - HOSPITAL COURSE
HPI:  69F with PMHx DM, HTN, VERONIKA, sciatica, peripheral neuropathy, seizures initially presenting to [facility] on 6/10/2025 complaining of progressively worsening shortness of breath x1 month, admitted for acute decompensated heart failure exacerbation and possible pneumonia. She was receiving lasix IV for diuresis & Rocephin for PNA. Transferred to CCU at outside hospital, and subsequently transferred to [facility] CICU.     Upon arrival to [facility] CICU, patient on bipap, appears comfortable, hemodynamically stable.  (14 Jun 2025 01:27)    Hospital Course:  Patient presented to [facility] on 6/10/25 with progressively worsening shortness of breath. She was admitted for acute decompensated heart failure and possible pneumonia versus congestive heart failure and received IV diuretics (Lasix) and antibiotics for pneumonia before being transferred to the CCU and subsequently to [facility] CICU for further management.   During her hospitalization, found to have heart failure with reduced ejection fraction (LVEF initially 20% outside hospital, improving to 30-35% on repeat TTE), acute respiratory failure with hypoxia initially requiring BIPAP, now managed with 1L nasal cannula. An extensive workup was initiated to evaluate the etiology of her heart failure, including CT coronary angiography: No significant stenosis of LAD or LCx. Minimal to mild category disease noted second to calcified and noncalcified plaque - not concerning for ischemic  cardiomyopathy, amyloid workup, cMRI, and nuclear scan, which were negative for infiltrative disease. She was treated with a Bumex drip, transitioned to Lasix 40mg BID, and initiated on guideline-directed medical therapy (GDMT) including Entresto 24/26 mg BID, Toprol 25 mg daily, Spironolactone 25mg daily, and an SGLT2i upon discharge. Further ischemic evaluation with LHC/RHC vs CCTA was recommended as an outpatient.      Additionally, Ms. Waters experienced altered mental status (AMS) likely secondary to mild hypercarbia, which improved with BIPAP. Neurology was consulted and the patient underwent an EEG and MRI which were negative. She will continue nocturnal BIPAP during her inpatient stay and transition to her home CPAP upon discharge.   CT angiography revealed incidental bilateral pulmonary emboli - bilateral pulmonary emboli involving right upper lobe lobar/segmental pulmonary arterial tree, right middle lobe lobar/segmental/subsegmental pulmonary arterial tree, right lower lobe segmental/subsegmental pulmonary arterial tree and left upper lobe subsegmental pulmonary arterial tree.   and the patient was started on Eliquis for therapeutic anticoagulation.   A left thoracentesis was performed for a pleural effusion, s/p L thoracentesis, 600 cc drained on 6/19   Also with acute kidney injury superimposed on chronic kidney disease (CKD stage 3b), which resolved during her hospitalization.   An incidental CT chest revealed left supraclavicular, mediastinal, and hilar lymphadenopathy suspicious for malignancy, and a left supraclavicular lymph node biopsy was positive for metastatic lung adenocarcinoma with possible enteric expression. Oncology was consulted and recommended an outpatient CT/PET scan, with referral to thoracic navigation. Patient to follow up oncologist - Dr. He as an outpatient to discuss further treatment options.    Her diabetes was well-controlled with a hemoglobin A1c of 5.3% on admission, on Insulin Lispro sliding scale while hospitalized, resume on her home Metformin on discharge. Her seizure disorder is being managed with Divalproex 500 mg BID. Continue  Aspirin 81 mg daily and Atorvastatin 80 mg for CAD and Neuropathy with Gabapentin 200 mg TID.      Important Medication Changes and Reason:  Entresto 24/26 mg BID, Toprol 75 mg daily, Spironolactone 25mg daily, and an SGLT2i upon discharge.   Lasix 40mg BID  Eliquis     Active or Pending Issues Requiring Follow-up:  Follow up with Cardiologist Dr. Wasserman in 1 week  Follow up with heart failure  Follow up oncologist - Dr. He as an outpatient to discuss further treatment options.    Advanced Directives:   [ ] Full code  [ ] DNR  [ ] Hospice    Discharge Diagnoses:         HPI:  69F with PMHx DM, HTN, VERONIKA, sciatica, peripheral neuropathy, seizures initially presenting to [facility] on 6/10/2025 complaining of progressively worsening shortness of breath x1 month, admitted for acute decompensated heart failure exacerbation and possible pneumonia. She was receiving lasix IV for diuresis & Rocephin for PNA. Transferred to CCU at outside hospital, and subsequently transferred to [facility] CICU.     Upon arrival to [facility] CICU, patient on bipap, appears comfortable, hemodynamically stable.  (14 Jun 2025 01:27)    Hospital Course:  Patient presented to [facility] on 6/10/25 with progressively worsening shortness of breath. She was admitted for acute decompensated heart failure and possible pneumonia versus congestive heart failure and received IV diuretics (Lasix) and antibiotics for pneumonia before being transferred to the CCU and subsequently to [facility] CICU for further management.   During her hospitalization, found to have heart failure with reduced ejection fraction (LVEF initially 20% outside hospital, improving to 30-35% on repeat TTE), acute respiratory failure with hypoxia initially requiring BIPAP, now managed with 1L nasal cannula. An extensive workup was initiated to evaluate the etiology of her heart failure, including CT coronary angiography: No significant stenosis of LAD or LCx. Minimal to mild category disease noted second to calcified and noncalcified plaque - not concerning for ischemic  cardiomyopathy, amyloid workup, cMRI, and nuclear scan, which were negative for infiltrative disease. She was treated with a Bumex drip, transitioned to Lasix 40mg BID, and initiated on guideline-directed medical therapy (GDMT) including Entresto 24/26 mg BID, Toprol 25 mg daily, Spironolactone 25mg daily, and an SGLT2i upon discharge.   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 Due to possible need for surgery/procedures, would pursue noninvasive ischemic eval to avoid use of DAPT if able.      Additionally, Ms. Waters experienced altered mental status (AMS) likely secondary to mild hypercarbia, which improved with BIPAP. Neurology was consulted and the patient underwent an EEG and MRI which were negative. She will continue nocturnal BIPAP during her inpatient stay and transition to her home CPAP upon discharge.   CT angiography revealed incidental bilateral pulmonary emboli - bilateral pulmonary emboli involving right upper lobe lobar/segmental pulmonary arterial tree, right middle lobe lobar/segmental/subsegmental pulmonary arterial tree, right lower lobe segmental/subsegmental pulmonary arterial tree and left upper lobe subsegmental pulmonary arterial tree.   and the patient was started on Eliquis for therapeutic anticoagulation.   A left thoracentesis was performed for a pleural effusion, s/p L thoracentesis, 600 cc drained on 6/19   Also with acute kidney injury superimposed on chronic kidney disease (CKD stage 3b), which resolved during her hospitalization.   An incidental CT chest revealed left supraclavicular, mediastinal, and hilar lymphadenopathy suspicious for malignancy, and a left supraclavicular lymph node biopsy was positive for metastatic lung adenocarcinoma with possible enteric expression. Oncology was consulted and recommended an outpatient CT/PET scan, with referral to thoracic navigation. Patient to follow up oncologist - Dr. He as an outpatient to discuss further treatment options.    Her diabetes was well-controlled with a hemoglobin A1c of 5.3% on admission, on Insulin Lispro sliding scale while hospitalized, resume on her home Metformin on discharge. Her seizure disorder is being managed with Divalproex 500 mg BID. Continue  Aspirin 81 mg daily and Atorvastatin 80 mg for CAD and Neuropathy with Gabapentin 200 mg TID.      Important Medication Changes and Reason:  Entresto 24/26 mg BID, Toprol 75 mg daily, Spironolactone 25mg daily, and an SGLT2i upon discharge.   Lasix 40mg BID  Eliquis     Active or Pending Issues Requiring Follow-up:  Follow up with Cardiologist Dr. Wasserman in 1 week  Follow up with heart failure - Dr. Garcia  Follow up oncologist - Dr. He as an outpatient to discuss further treatment options.    Advanced Directives:   [X] Full code  [ ] DNR  [ ] Hospice    Discharge Diagnoses:  Acute on Chronic Systolic decompensated heart failure and   Pneumonia   Altered mental status (AMS) likely secondary to mild hypercarbia,   Bilateral pulmonary emboli -  Pleural effusion, s/p L thoracentesis, 600 cc drained on 6/19   Acute kidney injury superimposed on chronic kidney disease (CKD stage 3b)  Metastatic lung adenocarcinoma   DM2  Hx Seizures  Hx of Neuropathy            HPI:  69F with PMHx DM, HTN, VERONIKA, sciatica, peripheral neuropathy, seizures initially presenting to [facility] on 6/10/2025 complaining of progressively worsening shortness of breath x1 month, admitted for acute decompensated heart failure exacerbation and possible pneumonia. She was receiving lasix IV for diuresis & Rocephin for PNA. Transferred to CCU at outside hospital, and subsequently transferred to [facility] CICU.     Upon arrival to [facility] CICU, patient on bipap, appears comfortable, hemodynamically stable.  (14 Jun 2025 01:27)    Hospital Course:  Patient presented to [facility] on 6/10/25 with progressively worsening shortness of breath. She was admitted for acute decompensated heart failure and possible pneumonia versus congestive heart failure and received IV diuretics (Lasix) and antibiotics for pneumonia before being transferred to the CCU and subsequently to [facility] CICU for further management.   During her hospitalization, found to have heart failure with reduced ejection fraction (LVEF initially 20% outside hospital, improving to 30-35% on repeat TTE), acute respiratory failure with hypoxia initially requiring BIPAP, now managed with 1L nasal cannula. An extensive workup was initiated to evaluate the etiology of her heart failure, including CT coronary angiography: No significant stenosis of LAD or LCx. Minimal to mild category disease noted second to calcified and noncalcified plaque - not concerning for ischemic  cardiomyopathy, amyloid workup, cMRI, and nuclear scan, which were negative for infiltrative disease. She was treated with a Bumex drip, transitioned to Lasix 40mg BID, and initiated on guideline-directed medical therapy (GDMT) including Entresto 24/26 mg BID, Toprol 25 mg daily, Spironolactone 25mg daily, and an SGLT2i upon discharge.   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 Due to possible need for surgery/procedures, would pursue noninvasive ischemic eval to avoid use of DAPT if able.      Additionally, Ms. Waters experienced altered mental status (AMS) likely secondary to mild hypercarbia, which improved with BIPAP. Neurology was consulted and the patient underwent an EEG and MRI which were negative. She will continue nocturnal BIPAP during her inpatient stay and transition to her home CPAP upon discharge.   CT angiography revealed incidental bilateral pulmonary emboli - bilateral pulmonary emboli involving right upper lobe lobar/segmental pulmonary arterial tree, right middle lobe lobar/segmental/subsegmental pulmonary arterial tree, right lower lobe segmental/subsegmental pulmonary arterial tree and left upper lobe subsegmental pulmonary arterial tree.   and the patient was started on Eliquis for therapeutic anticoagulation.   A left thoracentesis was performed for a pleural effusion, s/p L thoracentesis, 600 cc drained on 6/19   Also with acute kidney injury superimposed on chronic kidney disease (CKD stage 3b), which resolved during her hospitalization.   An incidental CT chest revealed left supraclavicular, mediastinal, and hilar lymphadenopathy suspicious for malignancy, and a left supraclavicular lymph node biopsy was positive for metastatic lung adenocarcinoma with possible enteric expression. Oncology was consulted and recommended an outpatient CT/PET scan, with referral to thoracic navigation. Patient to follow up oncologist - Dr. He as an outpatient to discuss further treatment options.    Her diabetes was well-controlled with a hemoglobin A1c of 5.3% on admission, on Insulin Lispro sliding scale while hospitalized, resume on her home Metformin on discharge. Her seizure disorder is being managed with Divalproex 500 mg BID. Continue  Aspirin 81 mg daily and Atorvastatin 80 mg for CAD and Neuropathy with Gabapentin 200 mg TID.      Important Medication Changes and Reason:  Entresto 24/26 mg BID, Toprol 75 mg daily, Spironolactone 25mg daily, and an SGLT2i upon discharge.   Lasix 40mg BID  Eliquis     Active or Pending Issues Requiring Follow-up:  Follow up with Cardiologist Dr. Wasserman in 1 week  Follow up with heart failure - Dr. Marie  Follow up oncologist - Dr. He as an outpatient to discuss further treatment options.    Advanced Directives:   [X] Full code  [ ] DNR  [ ] Hospice    Discharge Diagnoses:  Acute on Chronic Systolic decompensated heart failure and   Pneumonia   Altered mental status (AMS) likely secondary to mild hypercarbia,   Bilateral pulmonary emboli -  Pleural effusion, s/p L thoracentesis, 600 cc drained on 6/19   Acute kidney injury superimposed on chronic kidney disease (CKD stage 3b)  Metastatic lung adenocarcinoma   DM2  Hx Seizures  Hx of Neuropathy

## 2025-07-07 NOTE — PROGRESS NOTE ADULT - NSPROGADDITIONALINFOA_GEN_ALL_CORE
above plans discussed with medicine ACP Carolina
I updated her son, Timothy.
above plans discussed with medicine ACP Barbara
pt stable for discharge  d/c planning. Pt now just awaiting insurance authorization  plan of care d/w patient's son, Cesar, all questions answered.
plan of care d/w son Ruben at bedside. All questions answered.
pt stable for discharge  d/c planning. Pt now just awaiting insurance authorization
pt stable for discharge  d/c planning. Pt now just awaiting insurance authorization
Plan of care d/w Niece today who was at bedside.
pt stable for discharge  d/c planning. Pt now just awaiting insurance authorization  plan of care d/w patient's son, Cesar, all questions answered.
pt stable for discharge  d/c planning. Pt now just awaiting insurance authorization  plan of care d/w patient's son, Cesar, all questions answered.

## 2025-07-07 NOTE — PROGRESS NOTE ADULT - PROBLEM SELECTOR PLAN 9
plan to discharge home back on her home CPAP
plan to discharge home back on her home CPAP
- Gabapentin 200 mg TID
Unclear indication for pantoprazole; will continue for now, but discontinue if not part of home regimen.    General  - DVT prophylaxis: heparin drip  - dc and start HSQ  - Diet: DASH   - Medication reconciliation: incomplete, son to bring in home medications on 6/15/25 in evening   - Code: Full   - Medications: Tylenol 650 mg q6h as needed for pain, Maalox 30 mL q4h as needed for acid reflux, melatonin 3 mg bedtime as needed for insomnia, Zofran 4 mg IV q8h as needed for nausea/vomiting     Disposition  - Needed before discharge: off oxygen for 24 hours, cardiac and pulmonary work up, PT evaluation   - Anticipated discharge date: 6/17/25   - Discharge to: pending PT   - Appointments after discharge: PCP, heart failure
- Gabapentin 200 mg TID
Unclear indication for pantoprazole; will continue for now, but discontinue if not part of home regimen.    General  - DVT prophylaxis: heparin drip  - dc and start HSQ  - Diet: DASH   - Medication reconciliation: incomplete, son to bring in home medications on 6/15/25 in evening   - Code: Full   - Medications: Tylenol 650 mg q6h as needed for pain, Maalox 30 mL q4h as needed for acid reflux, melatonin 3 mg bedtime as needed for insomnia, Zofran 4 mg IV q8h as needed for nausea/vomiting     Disposition  - Needed before discharge: off oxygen for 24 hours, cardiac and pulmonary work up, PT evaluation   - Anticipated discharge date: 6/17/25   - Discharge to: pending PT   - Appointments after discharge: PCP, heart failure     Plan discussed with patient in detail. Patient agrees with plan. All questions answered. Patient to update family; offered to do this myself, but the patient does not want me to disturb her son at this time.
- Gabapentin 200 mg TID
- Gabapentin 200 mg TID
plan to discharge home back on her home CPAP
- Gabapentin 200 mg TID
- Gabapentin 200 mg TID
plan to discharge home back on her home CPAP
- Gabapentin 200 mg TID
plan to discharge home back on her home CPAP
Unclear indication for pantoprazole; will continue for now, but discontinue if not part of home regimen.    General  - DVT prophylaxis: heparin drip  - dc and start HSQ  - Diet: DASH   - Medication reconciliation: incomplete, son to bring in home medications on 6/15/25 in evening   - Code: Full   - Medications: Tylenol 650 mg q6h as needed for pain, Maalox 30 mL q4h as needed for acid reflux, melatonin 3 mg bedtime as needed for insomnia, Zofran 4 mg IV q8h as needed for nausea/vomiting     Disposition  - Needed before discharge: off oxygen for 24 hours, cardiac and pulmonary work up, PT evaluation   - Anticipated discharge date: 6/17/25   - Discharge to: pending PT   - Appointments after discharge: PCP, heart failure     Plan discussed with patient in detail. Patient agrees with plan. All questions answered. Patient to update family; offered to do this myself, but the patient does not want me to disturb her son at this time.

## 2025-07-07 NOTE — DISCHARGE NOTE PROVIDER - NSDCMRMEDTOKEN_GEN_ALL_CORE_FT
atorvastatin 10 mg oral tablet: 1 tab(s) orally once a day (at bedtime)  benazepril 20 mg oral tablet: 1 tab(s) orally once a day  furosemide 20 mg oral tablet: 1 tab(s) orally once a day  gabapentin 800 mg oral tablet: 1 tab(s) orally 3 times a day  hydroCHLOROthiazide 25 mg oral tablet: 1 tab(s) orally once a day  metFORMIN 1000 mg oral tablet: 1 tab(s) orally 2 times a day  metFORMIN 1000 mg oral tablet: 1 tab(s) orally 2 times a day  metoprolol succinate 100 mg oral tablet, extended release: 1 tab(s) orally once a day  PARoxetine 20 mg oral tablet: 1 tab(s) orally once a day  Ventolin 90 mcg/inh inhalation aerosol: 2 puff(s) inhaled every 4 to 6 hours as needed for  shortness of breath and/or wheezing   acetaminophen 325 mg oral tablet: 2 tab(s) orally every 6 hours As needed Temp greater or equal to 38C (100.4F), Mild Pain (1 - 3)  atorvastatin 10 mg oral tablet: 1 tab(s) orally once a day (at bedtime)  benzonatate 100 mg oral capsule: 1 cap(s) orally 3 times a day as needed for  cough  divalproex sodium 500 mg oral delayed release tablet: 1 tab(s) orally 2 times a day  Eliquis 5 mg oral tablet: 2 tab(s) orally every 12 hours 10mg BID until 7/10 and then 5mg BID starting 7/11  famotidine 20 mg oral tablet: 1 tab(s) orally once a day As needed heartburn  Farxiga 10 mg oral tablet: 1 tab(s) orally once a day  furosemide 40 mg oral tablet: 1 tab(s) orally 2 times a day  guaiFENesin 100 mg/5 mL oral liquid: 5 milliliter(s) orally 3 times a day As needed Cough  metoprolol succinate 25 mg oral tablet, extended release: 3 tab(s) orally once a day  pantoprazole 40 mg oral delayed release tablet: 1 tab(s) orally once a day (before a meal)  polyethylene glycol 3350 oral powder for reconstitution: 17 gram(s) orally once a day (at bedtime)  sacubitril-valsartan 24 mg-26 mg oral tablet: 1 tab(s) orally 2 times a day  senna leaf extract oral tablet: 2 tab(s) orally once a day (at bedtime)  spironolactone 25 mg oral tablet: 1 tab(s) orally once a day

## 2025-07-07 NOTE — DISCHARGE NOTE PROVIDER - NSDCCPCAREPLAN_GEN_ALL_CORE_FT
PRINCIPAL DISCHARGE DIAGNOSIS  Diagnosis: Acute on chronic systolic congestive heart failure  Assessment and Plan of Treatment: Admitted with heart failure with reduced ejection fraction (EF initially 20% outside hospital, improving to 30-35% on repeat TTE),    An extensive workup was initiated to evaluate the etiology of her heart failure, including CT coronary angiography: Minimal to mild blockage of heart arteries seen   MRI of heart and nuclear scan, which were negative for infiltrative disease.   Treated with a Bumex drip, transitioned to Lasix 40mg BID, and initiated on guideline-directed medical therapy (GDMT) including Entresto 24/26 mg 2 x day, Toprol 25 mg daily, Spironolactone 25mg daily, and Farxiga   Follow up with cardiologist - Dr. Wasserman in 1 week and Heart Failure after discharge for continued management of heart failure  Weigh yourself daily.  If you gain 3lbs in 3 days, or 5lbs in a week call your Health Care Provider.  Do not eat or drink foods containing more than 2000mg of salt (sodium) in your diet every day.  Call your Health Care Provider if you have any swelling or increased swelling in your feet, ankles, and/or stomach.  Take all of your medication as directed.  If you become dizzy call your Health Care Provider.        SECONDARY DISCHARGE DIAGNOSES  Diagnosis: Acute respiratory failure with hypoxia and hypercarbia  Assessment and Plan of Treatment: You experienced altered mental status (AMS) likely secondary to mild hypercarbia, which improved with BIPAP.   Neurology was consulted and the patient underwent an EEG and MRI which were negative.   continued nocturnal BIPAP during her inpatient stay and transitioned to her home CPAP upon discharge.       Diagnosis: Metastatic adenocarcinoma to lung  Assessment and Plan of Treatment: An incidental CT chest revealed left supraclavicular, mediastinal, and hilar enlarged lymph nodes seen suspicious for malignancy.  A left supraclavicular lymph node biopsy was positive for metastatic lung adenocarcinoma with possible enteric expression. Oncology was consulted and recommended an outpatient CT/PET scan, with referral to thoracic navigation. Patient to follow up oncologist - Dr. He as an outpatient to discuss further treatment options.      Diagnosis: Pulmonary embolism  Assessment and Plan of Treatment: CT angiography revealed incidental bilateral pulmonary emboli - clot in lungs  Started on Eliquis for therapeutic anticoagulation.   Follow up with your health care provider within one week. Call for appointment.  If you develop shortness of breath or if your shortness of breath worsens call your Health Care Provider or go to the Emergency Department.      Diagnosis: Pleural effusion  Assessment and Plan of Treatment: Fluid around lungs  A left sided thoracentesis was performed for a pleural effusion, and   600 cc fluid drained on 6/19       Diagnosis: Acute kidney injury superimposed on CKD  Assessment and Plan of Treatment: Also with acute kidney injury superimposed on chronic kidney disease (CKD stage 3b), which resolved during her hospitalization.       Diagnosis: Diabetes  Assessment and Plan of Treatment: Blood sugars well-controlled with a hemoglobin A1c of 5.3%   Continue home Metformin on discharge.   Make sure you get your HgA1c checked every three months.  It's important not to skip any meals.  Keep a log of your blood glucose results and always take it with you to your doctor appointments.  Keep a list of your current medications including injectables and over the counter medications and bring this medication list with you to all your doctor appointments.  If you have not seen your ophthalmologist this year call for appointment.  Check your feet daily for redness, sores, or openings. Do not self treat. If no improvement in two days call your primary care physician for an appointment.      Diagnosis: Seizure  Assessment and Plan of Treatment: Her seizure disorder is being managed with Divalproex 500 mg BID.    Diagnosis: VERONIKA on CPAP  Assessment and Plan of Treatment: Continue CPAP during sleep    Diagnosis: CAD (coronary artery disease)  Assessment and Plan of Treatment: Continue  Aspirin 81 mg daily and Atorvastatin 80 mg for CAD    Diagnosis: Neuropathy  Assessment and Plan of Treatment: Continue Gabapentin 200 mg 3 x day

## 2025-07-07 NOTE — CHART NOTE - NSCHARTNOTESELECT_GEN_ALL_CORE
AMS/Event Note
Heart Failure/Event Note
Nutrition Services
CT coronaries, HR/Event Note
Heart Failure
IR Chart Note
Transfer Note

## 2025-07-07 NOTE — PROGRESS NOTE ADULT - PROBLEM SELECTOR PLAN 10
- Gabapentin 200 mg TID
- Gabapentin 200 mg TID
Unclear indication for pantoprazole; will continue for now, but discontinue if not part of home regimen.    General  - DVT prophylaxis:  HSQ  - Diet: DASH   - Code: Full       Disposition  - Needed before discharge: off oxygen for 24 hours, cardiac and pulmonary work up, PT evaluation   - Anticipated discharge date: 6/17/25   - Discharge to: pending PT   - Appointments after discharge: PCP, heart failure
- Gabapentin 200 mg TID
Unclear indication for pantoprazole; will continue for now, but discontinue if not part of home regimen.    General  - DVT prophylaxis:  HSQ  - Diet: DASH   - Code: Full       Disposition  - Needed before discharge: off oxygen for 24 hours, cardiac and pulmonary work up, PT evaluation   - Anticipated discharge date: 6/17/25   - Discharge to: pending PT   - Appointments after discharge: PCP, heart failure

## 2025-07-07 NOTE — CHART NOTE - NSCHARTNOTEFT_GEN_A_CORE
Chart reviewed & case discussed with Dr. Holbrook. HF team to sign off at this time. Please reach out to HF ACP when closer to discharge, so HF team can establish outpatient follow-up. Chart reviewed & case discussed with Dr. Holbrook. HF team to sign off at this time. HFU to be scheduled at [facility] clinic. Supervising attending will be Dr. Billie Marie. Chart reviewed & case discussed with Dr. Holbrook. HF team to sign off at this time. HFU with NP 7/24 at 1:00pm at [facility] HF clinic. Supervising attending will be Dr. Billie Marie.

## 2025-07-07 NOTE — PROGRESS NOTE ADULT - PROBLEM SELECTOR PROBLEM 9
VERONIKA on CPAP
VERONIKA on CPAP
Neuropathy
VERONIKA on CPAP
Neuropathy
Neuropathy
Prophylactic measure
Neuropathy
Prophylactic measure
Neuropathy
Neuropathy
VERONIKA on CPAP
VERONIKA on CPAP
Neuropathy
Neuropathy
Prophylactic measure

## 2025-07-07 NOTE — PROGRESS NOTE ADULT - PROBLEM SELECTOR PROBLEM 2
Acute respiratory failure with hypoxia
Heart failure with reduced ejection fraction
Acute respiratory failure with hypoxia
Heart failure with reduced ejection fraction
Heart failure with reduced ejection fraction
Acute respiratory failure with hypoxia
Acute respiratory failure with hypoxia
Heart failure with reduced ejection fraction

## 2025-07-07 NOTE — DISCHARGE NOTE NURSING/CASE MANAGEMENT/SOCIAL WORK - FINANCIAL ASSISTANCE
[facility] provides services at a reduced cost to those who are determined to be eligible through [facility]’s financial assistance program. Information regarding [facility]’s financial assistance program can be found by going to [email or web address] or by calling [phone].

## 2025-07-07 NOTE — DISCHARGE NOTE PROVIDER - PROVIDER TOKENS
PROVIDER:[TOKEN:[[id number]],FOLLOWUP:[1 week]],PROVIDER:[TOKEN:[[id number]],FOLLOWUP:[2 weeks]],PROVIDER:[TOKEN:[[id number]],FOLLOWUP:[2 weeks]] PROVIDER:[TOKEN:[[id number]],FOLLOWUP:[1 week]],PROVIDER:[TOKEN:[[id number]],FOLLOWUP:[2 weeks]],PROVIDER:[TOKEN:[[id number]]]

## 2025-07-07 NOTE — PROGRESS NOTE ADULT - TIME BILLING
I have personally and independently provided 51 minutes inperforming a physical examination,  ordering medications/tests, documenting clinical information, and coordinating care. This excludes any time spent on teaching. patient encounter, reviewing tests and imaging, independently obtaining a history, performing a physical examination,  ordering medications/tests, documenting clinical information, and coordinating care. This excludes any time spent on teaching.

## 2025-07-10 PROBLEM — C80.1 ADENOCARCINOMA OF UNKNOWN PRIMARY: Status: ACTIVE | Noted: 2025-07-10

## 2025-07-14 LAB — POINT SOLID NEXT-GENERATION SEQUENCING PANEL FINAL REPORT: SIGNIFICANT CHANGE UP

## 2025-07-14 PROCEDURE — G0452: CPT | Mod: 26

## 2025-07-18 ENCOUNTER — NON-APPOINTMENT (OUTPATIENT)
Age: 70
End: 2025-07-18

## 2025-07-19 LAB
CULTURE RESULTS: SIGNIFICANT CHANGE UP
SPECIMEN SOURCE: SIGNIFICANT CHANGE UP

## 2025-07-24 ENCOUNTER — APPOINTMENT (OUTPATIENT)
Dept: HEART FAILURE | Facility: CLINIC | Age: 70
End: 2025-07-24

## 2025-08-06 ENCOUNTER — OUTPATIENT (OUTPATIENT)
Dept: OUTPATIENT SERVICES | Facility: HOSPITAL | Age: 70
LOS: 1 days | End: 2025-08-06
Payer: MEDICARE

## 2025-08-06 ENCOUNTER — APPOINTMENT (OUTPATIENT)
Dept: NUCLEAR MEDICINE | Facility: IMAGING CENTER | Age: 70
End: 2025-08-06
Payer: MEDICARE

## 2025-08-06 DIAGNOSIS — Z90.710 ACQUIRED ABSENCE OF BOTH CERVIX AND UTERUS: Chronic | ICD-10-CM

## 2025-08-06 DIAGNOSIS — H26.9 UNSPECIFIED CATARACT: Chronic | ICD-10-CM

## 2025-08-06 DIAGNOSIS — G56.03 CARPAL TUNNEL SYNDROME, BILATERAL UPPER LIMBS: Chronic | ICD-10-CM

## 2025-08-06 DIAGNOSIS — Z90.89 ACQUIRED ABSENCE OF OTHER ORGANS: Chronic | ICD-10-CM

## 2025-08-06 PROCEDURE — 78815 PET IMAGE W/CT SKULL-THIGH: CPT

## 2025-08-06 PROCEDURE — A9552: CPT

## 2025-08-06 PROCEDURE — 78815 PET IMAGE W/CT SKULL-THIGH: CPT | Mod: 26,PI

## 2025-08-19 ENCOUNTER — RESULT REVIEW (OUTPATIENT)
Age: 70
End: 2025-08-19

## 2025-08-19 ENCOUNTER — APPOINTMENT (OUTPATIENT)
Dept: HEMATOLOGY ONCOLOGY | Facility: CLINIC | Age: 70
End: 2025-08-19
Payer: MEDICAID

## 2025-08-19 ENCOUNTER — NON-APPOINTMENT (OUTPATIENT)
Age: 70
End: 2025-08-19

## 2025-08-19 VITALS
HEIGHT: 64 IN | OXYGEN SATURATION: 99 % | RESPIRATION RATE: 17 BRPM | HEART RATE: 71 BPM | BODY MASS INDEX: 34.59 KG/M2 | SYSTOLIC BLOOD PRESSURE: 131 MMHG | DIASTOLIC BLOOD PRESSURE: 79 MMHG | TEMPERATURE: 98 F | WEIGHT: 202.59 LBS

## 2025-08-19 DIAGNOSIS — D64.9 ANEMIA, UNSPECIFIED: ICD-10-CM

## 2025-08-19 DIAGNOSIS — Z01.812 ENCOUNTER FOR PREPROCEDURAL LABORATORY EXAMINATION: ICD-10-CM

## 2025-08-19 DIAGNOSIS — Z60.2 PROBLEMS RELATED TO LIVING ALONE: ICD-10-CM

## 2025-08-19 DIAGNOSIS — Z86.69 PERSONAL HISTORY OF OTHER DISEASES OF THE NERVOUS SYSTEM AND SENSE ORGANS: ICD-10-CM

## 2025-08-19 DIAGNOSIS — Z87.891 PERSONAL HISTORY OF NICOTINE DEPENDENCE: ICD-10-CM

## 2025-08-19 PROCEDURE — 99215 OFFICE O/P EST HI 40 MIN: CPT

## 2025-08-19 PROCEDURE — G2212 PROLONG OUTPT/OFFICE VIS: CPT

## 2025-08-19 PROCEDURE — G2211 COMPLEX E/M VISIT ADD ON: CPT

## 2025-08-19 RX ORDER — OXYCODONE AND ACETAMINOPHEN 5; 325 MG/1; MG/1
5-325 TABLET ORAL
Refills: 0 | Status: ACTIVE | COMMUNITY

## 2025-08-19 RX ORDER — LIDOCAINE PATCH 5% 700 MG/1
5 PATCH TOPICAL
Refills: 0 | Status: ACTIVE | COMMUNITY

## 2025-08-19 RX ORDER — POLYETHYLENE GLYCOL 3350 17 G/17G
17 POWDER, FOR SOLUTION ORAL
Refills: 0 | Status: ACTIVE | COMMUNITY

## 2025-08-19 RX ORDER — GUAIFENESIN 100 MG/5ML
100 SOLUTION ORAL
Refills: 0 | Status: ACTIVE | COMMUNITY

## 2025-08-19 RX ORDER — ATORVASTATIN CALCIUM 10 MG/1
10 TABLET, FILM COATED ORAL
Refills: 0 | Status: ACTIVE | COMMUNITY

## 2025-08-19 RX ORDER — PANTOPRAZOLE 40 MG/1
40 TABLET, DELAYED RELEASE ORAL
Refills: 0 | Status: ACTIVE | COMMUNITY

## 2025-08-19 RX ORDER — APIXABAN 5 MG/1
5 TABLET, FILM COATED ORAL
Refills: 0 | Status: ACTIVE | COMMUNITY

## 2025-08-19 RX ORDER — SODIUM PHOSPHATE, DIBASIC AND SODIUM PHOSPHATE, MONOBASIC 7; 19 G/230ML; G/230ML
ENEMA RECTAL
Refills: 0 | Status: ACTIVE | COMMUNITY

## 2025-08-19 RX ORDER — SENNOSIDES 8.6 MG/1
8.6 TABLET ORAL
Refills: 0 | Status: ACTIVE | COMMUNITY

## 2025-08-19 SDOH — SOCIAL STABILITY - SOCIAL INSECURITY: PROBLEMS RELATED TO LIVING ALONE: Z60.2

## 2025-08-20 ENCOUNTER — NON-APPOINTMENT (OUTPATIENT)
Age: 70
End: 2025-08-20

## 2025-08-20 LAB
ALBUMIN SERPL ELPH-MCNC: 3.3 G/DL
ALP BLD-CCNC: 93 U/L
ALT SERPL-CCNC: 6 U/L
ANION GAP SERPL CALC-SCNC: 13 MMOL/L
APTT BLD: 31 SEC
AST SERPL-CCNC: 13 U/L
BILIRUB SERPL-MCNC: 0.3 MG/DL
BUN SERPL-MCNC: 18 MG/DL
CALCIUM SERPL-MCNC: 10.2 MG/DL
CEA SERPL-MCNC: 2.6 NG/ML
CHLORIDE SERPL-SCNC: 103 MMOL/L
CO2 SERPL-SCNC: 23 MMOL/L
CREAT SERPL-MCNC: 0.98 MG/DL
EGFRCR SERPLBLD CKD-EPI 2021: 62 ML/MIN/1.73M2
FERRITIN SERPL-MCNC: 185 NG/ML
FOLATE SERPL-MCNC: 13.8 NG/ML
GLUCOSE SERPL-MCNC: 84 MG/DL
HBV CORE IGG+IGM SER QL: NONREACTIVE
HBV SURFACE AB SER QL: NONREACTIVE
HBV SURFACE AG SER QL: NONREACTIVE
HCV AB SER QL: NONREACTIVE
HCV S/CO RATIO: 0.18 S/CO
INR PPP: 1.44 RATIO
IRON SATN MFR SERPL: 18 %
IRON SERPL-MCNC: 40 UG/DL
LDH SERPL-CCNC: 231 U/L
POTASSIUM SERPL-SCNC: 4.8 MMOL/L
PROT SERPL-MCNC: 7.4 G/DL
PT BLD: 16.7 SEC
SODIUM SERPL-SCNC: 138 MMOL/L
TIBC SERPL-MCNC: 216 UG/DL
TSH SERPL-ACNC: 0.62 UIU/ML
UIBC SERPL-MCNC: 176 UG/DL
VIT B12 SERPL-MCNC: 472 PG/ML

## 2025-08-25 ENCOUNTER — APPOINTMENT (OUTPATIENT)
Dept: HEART FAILURE | Facility: CLINIC | Age: 70
End: 2025-08-25
Payer: MEDICARE

## 2025-08-25 VITALS
WEIGHT: 188 LBS | HEART RATE: 99 BPM | OXYGEN SATURATION: 97 % | BODY MASS INDEX: 32.1 KG/M2 | TEMPERATURE: 98.6 F | DIASTOLIC BLOOD PRESSURE: 78 MMHG | HEIGHT: 64 IN | SYSTOLIC BLOOD PRESSURE: 156 MMHG

## 2025-08-25 VITALS — DIASTOLIC BLOOD PRESSURE: 84 MMHG | SYSTOLIC BLOOD PRESSURE: 147 MMHG

## 2025-08-25 DIAGNOSIS — C77.1 SECONDARY AND UNSPECIFIED MALIGNANT NEOPLASM OF INTRATHORACIC LYMPH NODES: ICD-10-CM

## 2025-08-25 DIAGNOSIS — C80.1 MALIGNANT (PRIMARY) NEOPLASM, UNSPECIFIED: ICD-10-CM

## 2025-08-25 DIAGNOSIS — C34.31 MALIGNANT NEOPLASM OF LOWER LOBE, RIGHT BRONCHUS OR LUNG: ICD-10-CM

## 2025-08-25 DIAGNOSIS — C77.2 SECONDARY AND UNSPECIFIED MALIGNANT NEOPLASM OF INTRA-ABDOMINAL LYMPH NODES: ICD-10-CM

## 2025-08-25 DIAGNOSIS — I50.20 UNSPECIFIED SYSTOLIC (CONGESTIVE) HEART FAILURE: ICD-10-CM

## 2025-08-25 DIAGNOSIS — C77.0 SECONDARY AND UNSPECIFIED MALIGNANT NEOPLASM OF LYMPH NODES OF HEAD, FACE AND NECK: ICD-10-CM

## 2025-08-25 PROCEDURE — 99214 OFFICE O/P EST MOD 30 MIN: CPT

## 2025-08-25 RX ORDER — METOPROLOL SUCCINATE 100 MG/1
100 TABLET, EXTENDED RELEASE ORAL
Qty: 30 | Refills: 5 | Status: ACTIVE | COMMUNITY

## 2025-08-25 RX ORDER — DAPAGLIFLOZIN 10 MG/1
10 TABLET, FILM COATED ORAL
Qty: 1 | Refills: 3 | Status: ACTIVE | COMMUNITY

## 2025-08-25 RX ORDER — SACUBITRIL AND VALSARTAN 24; 26 MG/1; MG/1
24-26 TABLET, FILM COATED ORAL TWICE DAILY
Qty: 60 | Refills: 2 | Status: ACTIVE | COMMUNITY
Start: 2025-08-25

## 2025-08-25 RX ORDER — FUROSEMIDE 40 MG/1
40 TABLET ORAL
Refills: 0 | Status: ACTIVE | COMMUNITY

## 2025-08-28 ENCOUNTER — APPOINTMENT (OUTPATIENT)
Dept: ENDOVASCULAR SURGERY | Facility: CLINIC | Age: 70
End: 2025-08-28
Payer: MEDICARE

## 2025-08-30 PROBLEM — C34.31 MALIGNANT NEOPLASM OF LOWER LOBE OF RIGHT LUNG: Status: ACTIVE | Noted: 2025-08-19

## 2025-08-30 PROBLEM — C77.1 SECONDARY MALIGNANT NEOPLASM OF BRONCHOPULMONARY LYMPH NODES: Status: ACTIVE | Noted: 2025-08-19

## 2025-08-30 PROBLEM — C77.0 SECONDARY MALIGNANT NEOPLASM OF SUPRACLAVICULAR LYMPH NODES: Status: ACTIVE | Noted: 2025-08-19

## 2025-08-30 PROBLEM — C77.2 SECONDARY MALIGNANT NEOPLASM OF MESENTERIC LYMPH NODES: Status: ACTIVE | Noted: 2025-08-19

## 2025-09-02 ENCOUNTER — APPOINTMENT (OUTPATIENT)
Dept: ENDOVASCULAR SURGERY | Facility: CLINIC | Age: 70
End: 2025-09-02
Payer: MEDICARE

## 2025-09-02 ENCOUNTER — RESULT REVIEW (OUTPATIENT)
Age: 70
End: 2025-09-02

## 2025-09-02 ENCOUNTER — NON-APPOINTMENT (OUTPATIENT)
Age: 70
End: 2025-09-02

## 2025-09-02 VITALS
TEMPERATURE: 98.4 F | OXYGEN SATURATION: 100 % | SYSTOLIC BLOOD PRESSURE: 133 MMHG | RESPIRATION RATE: 16 BRPM | WEIGHT: 188 LBS | DIASTOLIC BLOOD PRESSURE: 77 MMHG | HEART RATE: 86 BPM | BODY MASS INDEX: 32.1 KG/M2 | HEIGHT: 64 IN

## 2025-09-02 DIAGNOSIS — C34.90 MALIGNANT NEOPLASM OF UNSPECIFIED PART OF UNSPECIFIED BRONCHUS OR LUNG: ICD-10-CM

## 2025-09-02 PROCEDURE — 77001 FLUOROGUIDE FOR VEIN DEVICE: CPT

## 2025-09-02 PROCEDURE — 76937 US GUIDE VASCULAR ACCESS: CPT

## 2025-09-02 PROCEDURE — 99203 OFFICE O/P NEW LOW 30 MIN: CPT | Mod: 25

## 2025-09-02 PROCEDURE — 36561 INSERT TUNNELED CV CATH: CPT | Mod: RT

## 2025-09-05 ENCOUNTER — NON-APPOINTMENT (OUTPATIENT)
Age: 70
End: 2025-09-05

## 2025-09-05 ENCOUNTER — APPOINTMENT (OUTPATIENT)
Dept: INFUSION THERAPY | Facility: HOSPITAL | Age: 70
End: 2025-09-05

## 2025-09-05 RX ORDER — METOCLOPRAMIDE 10 MG/1
10 TABLET ORAL EVERY 6 HOURS
Qty: 60 | Refills: 2 | Status: ACTIVE | COMMUNITY
Start: 2025-09-05 | End: 1900-01-01

## 2025-09-05 RX ORDER — LIDOCAINE AND PRILOCAINE 25; 25 MG/G; MG/G
2.5-2.5 CREAM TOPICAL
Qty: 1 | Refills: 3 | Status: ACTIVE | COMMUNITY
Start: 2025-09-05 | End: 1900-01-01

## 2025-09-08 ENCOUNTER — NON-APPOINTMENT (OUTPATIENT)
Age: 70
End: 2025-09-08

## 2025-09-12 ENCOUNTER — RESULT REVIEW (OUTPATIENT)
Age: 70
End: 2025-09-12

## 2025-09-12 ENCOUNTER — APPOINTMENT (OUTPATIENT)
Dept: INFUSION THERAPY | Facility: HOSPITAL | Age: 70
End: 2025-09-12

## 2025-09-12 ENCOUNTER — APPOINTMENT (OUTPATIENT)
Dept: HEMATOLOGY ONCOLOGY | Facility: CLINIC | Age: 70
End: 2025-09-12
Payer: MEDICARE

## 2025-09-12 ENCOUNTER — NON-APPOINTMENT (OUTPATIENT)
Age: 70
End: 2025-09-12

## 2025-09-12 DIAGNOSIS — C77.0 SECONDARY AND UNSPECIFIED MALIGNANT NEOPLASM OF LYMPH NODES OF HEAD, FACE AND NECK: ICD-10-CM

## 2025-09-12 DIAGNOSIS — D64.9 ANEMIA, UNSPECIFIED: ICD-10-CM

## 2025-09-12 DIAGNOSIS — I50.20 UNSPECIFIED SYSTOLIC (CONGESTIVE) HEART FAILURE: ICD-10-CM

## 2025-09-12 DIAGNOSIS — C77.2 SECONDARY AND UNSPECIFIED MALIGNANT NEOPLASM OF INTRA-ABDOMINAL LYMPH NODES: ICD-10-CM

## 2025-09-12 DIAGNOSIS — C34.31 MALIGNANT NEOPLASM OF LOWER LOBE, RIGHT BRONCHUS OR LUNG: ICD-10-CM

## 2025-09-12 DIAGNOSIS — C77.1 SECONDARY AND UNSPECIFIED MALIGNANT NEOPLASM OF INTRATHORACIC LYMPH NODES: ICD-10-CM

## 2025-09-12 PROCEDURE — 99214 OFFICE O/P EST MOD 30 MIN: CPT

## 2025-09-19 ENCOUNTER — RESULT REVIEW (OUTPATIENT)
Age: 70
End: 2025-09-19

## 2025-09-19 ENCOUNTER — APPOINTMENT (OUTPATIENT)
Dept: INFUSION THERAPY | Facility: HOSPITAL | Age: 70
End: 2025-09-19

## 2025-09-19 ENCOUNTER — APPOINTMENT (OUTPATIENT)
Dept: HEMATOLOGY ONCOLOGY | Facility: CLINIC | Age: 70
End: 2025-09-19

## 2025-09-26 PROBLEM — D50.9 IRON DEFICIENCY ANEMIA: Status: ACTIVE | Noted: 2025-09-26
